# Patient Record
Sex: FEMALE | Race: BLACK OR AFRICAN AMERICAN | NOT HISPANIC OR LATINO | URBAN - METROPOLITAN AREA
[De-identification: names, ages, dates, MRNs, and addresses within clinical notes are randomized per-mention and may not be internally consistent; named-entity substitution may affect disease eponyms.]

---

## 2019-12-10 ENCOUNTER — EMERGENCY (EMERGENCY)
Facility: HOSPITAL | Age: 79
LOS: 1 days | Discharge: HOME | End: 2019-12-10
Attending: STUDENT IN AN ORGANIZED HEALTH CARE EDUCATION/TRAINING PROGRAM
Payer: MEDICARE

## 2019-12-10 VITALS
TEMPERATURE: 98 F | OXYGEN SATURATION: 98 % | DIASTOLIC BLOOD PRESSURE: 67 MMHG | HEIGHT: 67 IN | WEIGHT: 220.02 LBS | HEART RATE: 108 BPM | RESPIRATION RATE: 24 BRPM | SYSTOLIC BLOOD PRESSURE: 132 MMHG

## 2019-12-10 VITALS
RESPIRATION RATE: 20 BRPM | DIASTOLIC BLOOD PRESSURE: 65 MMHG | OXYGEN SATURATION: 100 % | SYSTOLIC BLOOD PRESSURE: 134 MMHG | HEART RATE: 98 BPM

## 2019-12-10 LAB
ALBUMIN SERPL ELPH-MCNC: 4 G/DL — SIGNIFICANT CHANGE UP (ref 3.3–5.2)
ALP SERPL-CCNC: 65 U/L — SIGNIFICANT CHANGE UP (ref 40–120)
ALT FLD-CCNC: 28 U/L — SIGNIFICANT CHANGE UP
ANION GAP SERPL CALC-SCNC: 19 MMOL/L — HIGH (ref 5–17)
APTT BLD: 23.8 SEC — LOW (ref 27.5–36.3)
AST SERPL-CCNC: 39 U/L — HIGH
BASOPHILS # BLD AUTO: 0.08 K/UL — SIGNIFICANT CHANGE UP (ref 0–0.2)
BASOPHILS NFR BLD AUTO: 0.9 % — SIGNIFICANT CHANGE UP (ref 0–2)
BILIRUB SERPL-MCNC: 0.4 MG/DL — SIGNIFICANT CHANGE UP (ref 0.4–2)
BUN SERPL-MCNC: 23 MG/DL — HIGH (ref 8–20)
CALCIUM SERPL-MCNC: 10.1 MG/DL — SIGNIFICANT CHANGE UP (ref 8.6–10.2)
CHLORIDE SERPL-SCNC: 98 MMOL/L — SIGNIFICANT CHANGE UP (ref 98–107)
CO2 SERPL-SCNC: 20 MMOL/L — LOW (ref 22–29)
CREAT SERPL-MCNC: 1.18 MG/DL — SIGNIFICANT CHANGE UP (ref 0.5–1.3)
EOSINOPHIL # BLD AUTO: 0.1 K/UL — SIGNIFICANT CHANGE UP (ref 0–0.5)
EOSINOPHIL NFR BLD AUTO: 1.1 % — SIGNIFICANT CHANGE UP (ref 0–6)
GLUCOSE SERPL-MCNC: 212 MG/DL — HIGH (ref 70–115)
HCT VFR BLD CALC: 50 % — HIGH (ref 34.5–45)
HGB BLD-MCNC: 15.8 G/DL — HIGH (ref 11.5–15.5)
IMM GRANULOCYTES NFR BLD AUTO: 0.1 % — SIGNIFICANT CHANGE UP (ref 0–1.5)
INR BLD: 1.01 RATIO — SIGNIFICANT CHANGE UP (ref 0.88–1.16)
LIDOCAIN IGE QN: 47 U/L — SIGNIFICANT CHANGE UP (ref 22–51)
LYMPHOCYTES # BLD AUTO: 4.28 K/UL — HIGH (ref 1–3.3)
LYMPHOCYTES # BLD AUTO: 48.8 % — HIGH (ref 13–44)
MAGNESIUM SERPL-MCNC: 2.4 MG/DL — SIGNIFICANT CHANGE UP (ref 1.6–2.6)
MCHC RBC-ENTMCNC: 27.2 PG — SIGNIFICANT CHANGE UP (ref 27–34)
MCHC RBC-ENTMCNC: 31.6 GM/DL — LOW (ref 32–36)
MCV RBC AUTO: 86.2 FL — SIGNIFICANT CHANGE UP (ref 80–100)
MONOCYTES # BLD AUTO: 0.77 K/UL — SIGNIFICANT CHANGE UP (ref 0–0.9)
MONOCYTES NFR BLD AUTO: 8.8 % — SIGNIFICANT CHANGE UP (ref 2–14)
NEUTROPHILS # BLD AUTO: 3.53 K/UL — SIGNIFICANT CHANGE UP (ref 1.8–7.4)
NEUTROPHILS NFR BLD AUTO: 40.3 % — LOW (ref 43–77)
NT-PROBNP SERPL-SCNC: 37 PG/ML — SIGNIFICANT CHANGE UP (ref 0–300)
PLATELET # BLD AUTO: 226 K/UL — SIGNIFICANT CHANGE UP (ref 150–400)
POTASSIUM SERPL-MCNC: 4.4 MMOL/L — SIGNIFICANT CHANGE UP (ref 3.5–5.3)
POTASSIUM SERPL-SCNC: 4.4 MMOL/L — SIGNIFICANT CHANGE UP (ref 3.5–5.3)
PROT SERPL-MCNC: 8.3 G/DL — SIGNIFICANT CHANGE UP (ref 6.6–8.7)
PROTHROM AB SERPL-ACNC: 11.6 SEC — SIGNIFICANT CHANGE UP (ref 10–12.9)
RBC # BLD: 5.8 M/UL — HIGH (ref 3.8–5.2)
RBC # FLD: 15.2 % — HIGH (ref 10.3–14.5)
SODIUM SERPL-SCNC: 137 MMOL/L — SIGNIFICANT CHANGE UP (ref 135–145)
TROPONIN T SERPL-MCNC: <0.01 NG/ML — SIGNIFICANT CHANGE UP (ref 0–0.06)
WBC # BLD: 8.77 K/UL — SIGNIFICANT CHANGE UP (ref 3.8–10.5)
WBC # FLD AUTO: 8.77 K/UL — SIGNIFICANT CHANGE UP (ref 3.8–10.5)

## 2019-12-10 PROCEDURE — 99285 EMERGENCY DEPT VISIT HI MDM: CPT

## 2019-12-10 PROCEDURE — 71046 X-RAY EXAM CHEST 2 VIEWS: CPT

## 2019-12-10 PROCEDURE — 85027 COMPLETE CBC AUTOMATED: CPT

## 2019-12-10 PROCEDURE — 85610 PROTHROMBIN TIME: CPT

## 2019-12-10 PROCEDURE — 71046 X-RAY EXAM CHEST 2 VIEWS: CPT | Mod: 26

## 2019-12-10 PROCEDURE — 84484 ASSAY OF TROPONIN QUANT: CPT

## 2019-12-10 PROCEDURE — 93005 ELECTROCARDIOGRAM TRACING: CPT

## 2019-12-10 PROCEDURE — 85730 THROMBOPLASTIN TIME PARTIAL: CPT

## 2019-12-10 PROCEDURE — 99284 EMERGENCY DEPT VISIT MOD MDM: CPT | Mod: 25

## 2019-12-10 PROCEDURE — 83880 ASSAY OF NATRIURETIC PEPTIDE: CPT

## 2019-12-10 PROCEDURE — 93010 ELECTROCARDIOGRAM REPORT: CPT

## 2019-12-10 PROCEDURE — 83690 ASSAY OF LIPASE: CPT

## 2019-12-10 PROCEDURE — 36415 COLL VENOUS BLD VENIPUNCTURE: CPT

## 2019-12-10 PROCEDURE — 80053 COMPREHEN METABOLIC PANEL: CPT

## 2019-12-10 PROCEDURE — 83735 ASSAY OF MAGNESIUM: CPT

## 2019-12-10 NOTE — ED PROVIDER NOTE - PATIENT PORTAL LINK FT
You can access the FollowMyHealth Patient Portal offered by [facility] by registering at the following website: [email or web address]. By joining [employer]’s FollowMyHealth portal, you will also be able to view your health information using other applications (apps) compatible with our system.

## 2019-12-10 NOTE — ED PROVIDER NOTE - PHYSICAL EXAMINATION
Const: Awake, alert and oriented. In no acute distress. Well appearing.  HEENT: NC/AT. Moist mucous membranes.  Eyes: No scleral icterus. EOMI.  Neck:. Soft and supple. Full ROM without pain.  Cardiac: Regular rate and regular rhythm. +S1/S2. Peripheral pulses 2+ and symmetric. No LE edema.  Resp: Speaking in full sentences. No evidence of respiratory distress. No wheezes, rales or rhonchi.  Abd: Soft, non-tender, non-distended. Normal bowel sounds in all 4 quadrants. No guarding or rebound.  Back: Spine midline and non-tender. No CVAT.  Skin: No rashes, abrasions or lacerations.  Lymph: No cervical lymphadenopathy.  Neuro: Awake, alert & oriented x 3. Moves all extremities symmetrically. follows commands, motor david upper and lower ext 5/5, sensory symm and intact CN 2-12 grossly intact, no ataxia, no nystagmus, no dysmetria, no ddk, symm david, no pronator drift

## 2019-12-10 NOTE — ED ADULT NURSE NOTE - OBJECTIVE STATEMENT
79y Female A&Ox4 c/o shortness of breath. Patient arrived via EMS, awake alert, breathing unlabored.  Patient states daughter fell, and she was trying to hold her up from falling.  Patient complaining of SOB which has been present for months. Pt increasingly dyspneic on room air during conversation. Pt denies LOC, chest pain, changes in GI/ patterns.

## 2019-12-10 NOTE — ED ADULT NURSE REASSESSMENT NOTE - NS ED NURSE REASSESS COMMENT FT1
SOB/ difficulty breathing has improved.  patient resting comfortably in wheelchair, breathing unlabored.  patient speaking on phone without difficulty in full sentences.

## 2019-12-10 NOTE — ED PROVIDER NOTE - OBJECTIVE STATEMENT
78yo female with pmh of HTN, HLD, DM, MYRIAM presents with sob. Pt saw her friend fall down stairs therefore called EMS and then she started to get anxious and hyperventilate, felt sob. Pt states then she started to feel lightheaded, worse when getting up, no loc/syncope. Pt states she is being worked up by pulm for her breathing had a CTA chest for PE done last friday doesn't know results but has not been told they found anything. PT had duplex LE last week which was normal. Pt denies fevers/chills, ha, loc, focal neuro deficits, cp/palp, cough, abd pain/n/v/d, urinary symptoms, recent travel and sick contacts.

## 2019-12-10 NOTE — ED ADULT TRIAGE NOTE - CHIEF COMPLAINT QUOTE
Patient arrived via EMS, awake alert, and oriented times 3, breathing unlabored.  Patient states daughter fell, and she was trying to hold her up from falling.  Patient complaining of SOB which has been present for months.

## 2019-12-10 NOTE — ED PROVIDER NOTE - CLINICAL SUMMARY MEDICAL DECISION MAKING FREE TEXT BOX
78yo female with pmh of HTN, HLD, DM, MYRIAM presents with sob. 78yo female with pmh of HTN, HLD, DM, MYRIAM presents with sob - no cp, sat % while here on RA, labs wnl, cxr no acute finding (wet read). Pt much more comfortable now likely due to the stress of seeing daughter fall and hyperventilating, pt to follow up with pmd and pulm and cards, which she has follow up appts with

## 2019-12-10 NOTE — ED PROVIDER NOTE - NS ED ROS FT
Review of Systems:  	•	CONSTITUTIONAL - no fever, no diaphoresis, no weight change  	•	SKIN - no rash  	•	HEMATOLOGIC - no bleeding, no bruising  	•	EYES - no eye pain, no blurred vision  	•	ENT - no change in hearing, no pain  	•	RESPIRATORY - + shortness of breath, no cough  	•	CARDIAC - no chest pain, no palpitations  	•	GI - no abd pain, no nausea, no vomiting, no diarrhea, no constipation, no bleeding  	•	GENITO-URINARY - no vaginal bleeding, no discharge, no dysuria; no hematuria  	•	ENDO - no polydypsia, no polyurea, no heat/no cold intolerance  	•	MUSCULOSKELETAL - no joint paint, no swelling, no redness  	•	NEUROLOGIC - +lightheaded, no weakness, no headache, no anesthesia, no paresthesias  	•	PSYCH - no anxiety, non suicidal, non homicidal, no hallucination, no depression

## 2022-12-14 ENCOUNTER — EMERGENCY (EMERGENCY)
Facility: HOSPITAL | Age: 82
LOS: 1 days | Discharge: HOME | End: 2022-12-14
Attending: EMERGENCY MEDICINE
Payer: MEDICARE

## 2022-12-14 VITALS
HEART RATE: 92 BPM | SYSTOLIC BLOOD PRESSURE: 178 MMHG | DIASTOLIC BLOOD PRESSURE: 82 MMHG | OXYGEN SATURATION: 99 % | TEMPERATURE: 98 F | RESPIRATION RATE: 20 BRPM

## 2022-12-14 VITALS
OXYGEN SATURATION: 100 % | SYSTOLIC BLOOD PRESSURE: 165 MMHG | HEART RATE: 80 BPM | TEMPERATURE: 98 F | DIASTOLIC BLOOD PRESSURE: 76 MMHG | RESPIRATION RATE: 19 BRPM

## 2022-12-14 PROBLEM — I10 ESSENTIAL (PRIMARY) HYPERTENSION: Chronic | Status: ACTIVE | Noted: 2019-12-10

## 2022-12-14 PROBLEM — E11.9 TYPE 2 DIABETES MELLITUS WITHOUT COMPLICATIONS: Chronic | Status: ACTIVE | Noted: 2019-12-10

## 2022-12-14 PROBLEM — E78.5 HYPERLIPIDEMIA, UNSPECIFIED: Chronic | Status: ACTIVE | Noted: 2019-12-10

## 2022-12-14 LAB
ALBUMIN SERPL ELPH-MCNC: 3.9 G/DL — SIGNIFICANT CHANGE UP (ref 3.3–5.2)
ALP SERPL-CCNC: 60 U/L — SIGNIFICANT CHANGE UP (ref 40–120)
ALT FLD-CCNC: 16 U/L — SIGNIFICANT CHANGE UP
ANION GAP SERPL CALC-SCNC: 13 MMOL/L — SIGNIFICANT CHANGE UP (ref 5–17)
APTT BLD: 32.8 SEC — SIGNIFICANT CHANGE UP (ref 27.5–35.5)
AST SERPL-CCNC: 25 U/L — SIGNIFICANT CHANGE UP
BASOPHILS # BLD AUTO: 0.08 K/UL — SIGNIFICANT CHANGE UP (ref 0–0.2)
BASOPHILS NFR BLD AUTO: 1.1 % — SIGNIFICANT CHANGE UP (ref 0–2)
BILIRUB SERPL-MCNC: 0.4 MG/DL — SIGNIFICANT CHANGE UP (ref 0.4–2)
BUN SERPL-MCNC: 25.3 MG/DL — HIGH (ref 8–20)
CALCIUM SERPL-MCNC: 9.9 MG/DL — SIGNIFICANT CHANGE UP (ref 8.4–10.5)
CHLORIDE SERPL-SCNC: 106 MMOL/L — SIGNIFICANT CHANGE UP (ref 96–108)
CO2 SERPL-SCNC: 22 MMOL/L — SIGNIFICANT CHANGE UP (ref 22–29)
CREAT SERPL-MCNC: 1.1 MG/DL — SIGNIFICANT CHANGE UP (ref 0.5–1.3)
EGFR: 50 ML/MIN/1.73M2 — LOW
EOSINOPHIL # BLD AUTO: 0.13 K/UL — SIGNIFICANT CHANGE UP (ref 0–0.5)
EOSINOPHIL NFR BLD AUTO: 1.7 % — SIGNIFICANT CHANGE UP (ref 0–6)
GLUCOSE SERPL-MCNC: 162 MG/DL — HIGH (ref 70–99)
HCT VFR BLD CALC: 30.7 % — LOW (ref 34.5–45)
HGB BLD-MCNC: 9.3 G/DL — LOW (ref 11.5–15.5)
IMM GRANULOCYTES NFR BLD AUTO: 0.3 % — SIGNIFICANT CHANGE UP (ref 0–0.9)
INR BLD: 1.54 RATIO — HIGH (ref 0.88–1.16)
LYMPHOCYTES # BLD AUTO: 2.73 K/UL — SIGNIFICANT CHANGE UP (ref 1–3.3)
LYMPHOCYTES # BLD AUTO: 36.1 % — SIGNIFICANT CHANGE UP (ref 13–44)
MCHC RBC-ENTMCNC: 24 PG — LOW (ref 27–34)
MCHC RBC-ENTMCNC: 30.3 GM/DL — LOW (ref 32–36)
MCV RBC AUTO: 79.3 FL — LOW (ref 80–100)
MONOCYTES # BLD AUTO: 0.52 K/UL — SIGNIFICANT CHANGE UP (ref 0–0.9)
MONOCYTES NFR BLD AUTO: 6.9 % — SIGNIFICANT CHANGE UP (ref 2–14)
NEUTROPHILS # BLD AUTO: 4.08 K/UL — SIGNIFICANT CHANGE UP (ref 1.8–7.4)
NEUTROPHILS NFR BLD AUTO: 53.9 % — SIGNIFICANT CHANGE UP (ref 43–77)
PLATELET # BLD AUTO: 385 K/UL — SIGNIFICANT CHANGE UP (ref 150–400)
POTASSIUM SERPL-MCNC: 4.6 MMOL/L — SIGNIFICANT CHANGE UP (ref 3.5–5.3)
POTASSIUM SERPL-SCNC: 4.6 MMOL/L — SIGNIFICANT CHANGE UP (ref 3.5–5.3)
PROT SERPL-MCNC: 7.4 G/DL — SIGNIFICANT CHANGE UP (ref 6.6–8.7)
PROTHROM AB SERPL-ACNC: 18 SEC — HIGH (ref 10.5–13.4)
RBC # BLD: 3.87 M/UL — SIGNIFICANT CHANGE UP (ref 3.8–5.2)
RBC # FLD: 15.2 % — HIGH (ref 10.3–14.5)
SODIUM SERPL-SCNC: 141 MMOL/L — SIGNIFICANT CHANGE UP (ref 135–145)
TROPONIN T SERPL-MCNC: <0.01 NG/ML — SIGNIFICANT CHANGE UP (ref 0–0.06)
WBC # BLD: 7.56 K/UL — SIGNIFICANT CHANGE UP (ref 3.8–10.5)
WBC # FLD AUTO: 7.56 K/UL — SIGNIFICANT CHANGE UP (ref 3.8–10.5)

## 2022-12-14 PROCEDURE — 85025 COMPLETE CBC W/AUTO DIFF WBC: CPT

## 2022-12-14 PROCEDURE — 36415 COLL VENOUS BLD VENIPUNCTURE: CPT

## 2022-12-14 PROCEDURE — 84484 ASSAY OF TROPONIN QUANT: CPT

## 2022-12-14 PROCEDURE — 93005 ELECTROCARDIOGRAM TRACING: CPT

## 2022-12-14 PROCEDURE — 85610 PROTHROMBIN TIME: CPT

## 2022-12-14 PROCEDURE — 71045 X-RAY EXAM CHEST 1 VIEW: CPT | Mod: 26

## 2022-12-14 PROCEDURE — 71045 X-RAY EXAM CHEST 1 VIEW: CPT

## 2022-12-14 PROCEDURE — 85730 THROMBOPLASTIN TIME PARTIAL: CPT

## 2022-12-14 PROCEDURE — 99284 EMERGENCY DEPT VISIT MOD MDM: CPT

## 2022-12-14 PROCEDURE — 93010 ELECTROCARDIOGRAM REPORT: CPT

## 2022-12-14 PROCEDURE — 99285 EMERGENCY DEPT VISIT HI MDM: CPT | Mod: 25

## 2022-12-14 PROCEDURE — 80053 COMPREHEN METABOLIC PANEL: CPT

## 2022-12-14 NOTE — ED PROVIDER NOTE - PROGRESS NOTE DETAILS
patient asymptomatic in the ED  VSS  results d/w patient, including anemia, and patient states she has anemia and hgb was 8 at [facility]  patient states she had a repeat echo x few days ago with her cardiologist  patient has follow up scheduled with [place] duffy md

## 2022-12-14 NOTE — ED ADULT NURSE NOTE - OBJECTIVE STATEMENT
81 y/o c/o a episode of dizziness and lightheadedness while she was eating breakfast this morning. patient reports she was recently hospitalized for b/l PEs and DVT in her right leg. patient reports she was discharged with blood thinners, eliquis. patient reports she has been taking her eliquis as prescribed. patient is currently on outpatient cardiac monitoring as per her cardiologist. patient reports she only took her eliquis this am. patient is A&Ox4. family bedside currently. denies chest pain or difficulty breathing at this time. VSS. continuing to monitor patient closely.

## 2022-12-14 NOTE — ED PROVIDER NOTE - OBJECTIVE STATEMENT
83 yo female with hx htn, dm, recent PE on eliquis, c/o episodes of lightheadedness this am. patient was admitted to [facility] 2 weeks ago, and had thrombectomy and started on eliquis. patient was scheduled for follow up today and was lightheaded  denies chest pain or sob  denies abdominal pain  currently asymptomatic

## 2022-12-14 NOTE — ED PROVIDER NOTE - PATIENT PORTAL LINK FT
You can access the FollowMyHealth Patient Portal offered by [facility] by registering at the following website: [email or web address]. By joining [employer]’s FollowMyHealth portal, you will also be able to view your health information using other applications (apps) compatible with our system.

## 2022-12-14 NOTE — ED PROVIDER NOTE - OUTSIDE ED RECORD SUMMARY
reviewed dc paperwork from [facility]  diagnosed with PE and started on eliquis x 2 weeks ago

## 2022-12-14 NOTE — ED ADULT NURSE NOTE - CHIEF COMPLAINT QUOTE
Pt BIBA c/o dizziness, intermittent x 2 weeks.  States she was treated in the hospital 2 weeks ago and told she had PEs.  Placed on blood thinners.  States she has a hx of vertigo but it has been a long time since she had an episode.  Denies cp.  No weakness, loss of sensation noted.

## 2023-09-11 NOTE — ED PROVIDER NOTE - CPE EDP PSYCH NORM
Please see my chart message below     Please review and advise     Thank you     Hayley Dunn RN, BSN  [facility]     
normal...

## 2024-03-13 ENCOUNTER — OFFICE (OUTPATIENT)
Dept: URBAN - METROPOLITAN AREA CLINIC 88 | Facility: CLINIC | Age: 84
Setting detail: OPHTHALMOLOGY
End: 2024-03-13
Payer: MEDICARE

## 2024-03-13 DIAGNOSIS — E11.3413: ICD-10-CM

## 2024-03-13 PROCEDURE — 92134 CPTRZ OPH DX IMG PST SGM RTA: CPT | Performed by: OPHTHALMOLOGY

## 2024-03-13 PROCEDURE — 99213 OFFICE O/P EST LOW 20 MIN: CPT | Performed by: OPHTHALMOLOGY

## 2024-03-13 ASSESSMENT — LID EXAM ASSESSMENTS
OD_BLEPHARITIS: RLL T
OS_BLEPHARITIS: LLL T

## 2024-04-25 ENCOUNTER — OFFICE (OUTPATIENT)
Dept: URBAN - METROPOLITAN AREA CLINIC 94 | Facility: CLINIC | Age: 84
Setting detail: OPHTHALMOLOGY
End: 2024-04-25
Payer: MEDICARE

## 2024-04-25 DIAGNOSIS — Z96.1: ICD-10-CM

## 2024-04-25 DIAGNOSIS — H01.002: ICD-10-CM

## 2024-04-25 DIAGNOSIS — H40.003: ICD-10-CM

## 2024-04-25 DIAGNOSIS — H01.005: ICD-10-CM

## 2024-04-25 PROCEDURE — 99213 OFFICE O/P EST LOW 20 MIN: CPT | Performed by: OPHTHALMOLOGY

## 2024-04-25 PROCEDURE — 92083 EXTENDED VISUAL FIELD XM: CPT | Performed by: OPHTHALMOLOGY

## 2024-04-25 ASSESSMENT — LID EXAM ASSESSMENTS
OD_BLEPHARITIS: RLL T
OS_BLEPHARITIS: LLL T

## 2024-05-15 ENCOUNTER — OFFICE (OUTPATIENT)
Dept: URBAN - METROPOLITAN AREA CLINIC 88 | Facility: CLINIC | Age: 84
Setting detail: OPHTHALMOLOGY
End: 2024-05-15
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
## 2024-05-15 PROCEDURE — NO SHOW FE NO SHOW FEE: Performed by: OPHTHALMOLOGY

## 2024-10-24 ENCOUNTER — OFFICE (OUTPATIENT)
Dept: URBAN - METROPOLITAN AREA CLINIC 94 | Facility: CLINIC | Age: 84
Setting detail: OPHTHALMOLOGY
End: 2024-10-24
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
## 2024-10-24 PROCEDURE — NO SHOW FE NO SHOW FEE: Performed by: OPHTHALMOLOGY

## 2025-06-01 ENCOUNTER — INPATIENT (INPATIENT)
Facility: HOSPITAL | Age: 85
LOS: 8 days | Discharge: HOME HEALTH | DRG: 69 | End: 2025-06-10
Attending: HOSPITALIST | Admitting: PSYCHIATRY & NEUROLOGY
Payer: COMMERCIAL

## 2025-06-01 VITALS
DIASTOLIC BLOOD PRESSURE: 62 MMHG | SYSTOLIC BLOOD PRESSURE: 147 MMHG | OXYGEN SATURATION: 98 % | RESPIRATION RATE: 18 BRPM | TEMPERATURE: 98 F | HEART RATE: 79 BPM

## 2025-06-01 DIAGNOSIS — G45.9 TRANSIENT CEREBRAL ISCHEMIC ATTACK, UNSPECIFIED: ICD-10-CM

## 2025-06-01 DIAGNOSIS — I26.99 OTHER PULMONARY EMBOLISM WITHOUT ACUTE COR PULMONALE: ICD-10-CM

## 2025-06-01 LAB
ALBUMIN SERPL ELPH-MCNC: 3.4 G/DL — SIGNIFICANT CHANGE UP (ref 3.3–5.2)
ALP SERPL-CCNC: 81 U/L — SIGNIFICANT CHANGE UP (ref 40–120)
ALT FLD-CCNC: 8 U/L — SIGNIFICANT CHANGE UP
ANION GAP SERPL CALC-SCNC: 17 MMOL/L — SIGNIFICANT CHANGE UP (ref 5–17)
APTT BLD: 27.7 SEC — SIGNIFICANT CHANGE UP (ref 26.1–36.8)
AST SERPL-CCNC: 18 U/L — SIGNIFICANT CHANGE UP
BASOPHILS # BLD AUTO: 0.07 K/UL — SIGNIFICANT CHANGE UP (ref 0–0.2)
BASOPHILS NFR BLD AUTO: 1 % — SIGNIFICANT CHANGE UP (ref 0–2)
BILIRUB SERPL-MCNC: 0.3 MG/DL — LOW (ref 0.4–2)
BUN SERPL-MCNC: 24.7 MG/DL — HIGH (ref 8–20)
CALCIUM SERPL-MCNC: 8.9 MG/DL — SIGNIFICANT CHANGE UP (ref 8.4–10.5)
CHLORIDE SERPL-SCNC: 102 MMOL/L — SIGNIFICANT CHANGE UP (ref 96–108)
CK SERPL-CCNC: 133 U/L — SIGNIFICANT CHANGE UP (ref 25–170)
CO2 SERPL-SCNC: 17 MMOL/L — LOW (ref 22–29)
CREAT SERPL-MCNC: 1.83 MG/DL — HIGH (ref 0.5–1.3)
EGFR: 27 ML/MIN/1.73M2 — LOW
EGFR: 27 ML/MIN/1.73M2 — LOW
EOSINOPHIL # BLD AUTO: 0.06 K/UL — SIGNIFICANT CHANGE UP (ref 0–0.5)
EOSINOPHIL NFR BLD AUTO: 0.9 % — SIGNIFICANT CHANGE UP (ref 0–6)
GLUCOSE BLDC GLUCOMTR-MCNC: 210 MG/DL — HIGH (ref 70–99)
GLUCOSE BLDC GLUCOMTR-MCNC: 224 MG/DL — HIGH (ref 70–99)
GLUCOSE SERPL-MCNC: 319 MG/DL — HIGH (ref 70–99)
HCT VFR BLD CALC: 30.5 % — LOW (ref 34.5–45)
HGB BLD-MCNC: 8.9 G/DL — LOW (ref 11.5–15.5)
IMM GRANULOCYTES # BLD AUTO: 0.03 K/UL — SIGNIFICANT CHANGE UP (ref 0–0.07)
IMM GRANULOCYTES NFR BLD AUTO: 0.4 % — SIGNIFICANT CHANGE UP (ref 0–0.9)
INR BLD: 1.21 RATIO — HIGH (ref 0.85–1.16)
LYMPHOCYTES # BLD AUTO: 1.7 K/UL — SIGNIFICANT CHANGE UP (ref 1–3.3)
LYMPHOCYTES NFR BLD AUTO: 24.4 % — SIGNIFICANT CHANGE UP (ref 13–44)
MCHC RBC-ENTMCNC: 23.3 PG — LOW (ref 27–34)
MCHC RBC-ENTMCNC: 29.2 G/DL — LOW (ref 32–36)
MCV RBC AUTO: 79.8 FL — LOW (ref 80–100)
MONOCYTES # BLD AUTO: 0.37 K/UL — SIGNIFICANT CHANGE UP (ref 0–0.9)
MONOCYTES NFR BLD AUTO: 5.3 % — SIGNIFICANT CHANGE UP (ref 2–14)
NEUTROPHILS # BLD AUTO: 4.75 K/UL — SIGNIFICANT CHANGE UP (ref 1.8–7.4)
NEUTROPHILS NFR BLD AUTO: 68 % — SIGNIFICANT CHANGE UP (ref 43–77)
NRBC # BLD AUTO: 0 K/UL — SIGNIFICANT CHANGE UP (ref 0–0)
NRBC # FLD: 0 K/UL — SIGNIFICANT CHANGE UP (ref 0–0)
NRBC BLD AUTO-RTO: 0 /100 WBCS — SIGNIFICANT CHANGE UP (ref 0–0)
PLATELET # BLD AUTO: 274 K/UL — SIGNIFICANT CHANGE UP (ref 150–400)
PMV BLD: 10.1 FL — SIGNIFICANT CHANGE UP (ref 7–13)
POTASSIUM SERPL-MCNC: 5.4 MMOL/L — HIGH (ref 3.5–5.3)
POTASSIUM SERPL-SCNC: 5.4 MMOL/L — HIGH (ref 3.5–5.3)
PROT SERPL-MCNC: 7.1 G/DL — SIGNIFICANT CHANGE UP (ref 6.6–8.7)
PROTHROM AB SERPL-ACNC: 13.7 SEC — HIGH (ref 9.9–13.4)
RBC # BLD: 3.82 M/UL — SIGNIFICANT CHANGE UP (ref 3.8–5.2)
RBC # FLD: 17.2 % — HIGH (ref 10.3–14.5)
SODIUM SERPL-SCNC: 136 MMOL/L — SIGNIFICANT CHANGE UP (ref 135–145)
TROPONIN T, HIGH SENSITIVITY RESULT: 33 NG/L — SIGNIFICANT CHANGE UP (ref 0–51)
WBC # BLD: 6.98 K/UL — SIGNIFICANT CHANGE UP (ref 3.8–10.5)
WBC # FLD AUTO: 6.98 K/UL — SIGNIFICANT CHANGE UP (ref 3.8–10.5)

## 2025-06-01 PROCEDURE — 71045 X-RAY EXAM CHEST 1 VIEW: CPT | Mod: 26

## 2025-06-01 PROCEDURE — 70496 CT ANGIOGRAPHY HEAD: CPT | Mod: 26

## 2025-06-01 PROCEDURE — 0042T: CPT

## 2025-06-01 PROCEDURE — 93306 TTE W/DOPPLER COMPLETE: CPT | Mod: 26

## 2025-06-01 PROCEDURE — 93970 EXTREMITY STUDY: CPT | Mod: 26

## 2025-06-01 PROCEDURE — 99222 1ST HOSP IP/OBS MODERATE 55: CPT

## 2025-06-01 PROCEDURE — 70450 CT HEAD/BRAIN W/O DYE: CPT | Mod: 26,59

## 2025-06-01 PROCEDURE — 70498 CT ANGIOGRAPHY NECK: CPT | Mod: 26

## 2025-06-01 PROCEDURE — 99285 EMERGENCY DEPT VISIT HI MDM: CPT

## 2025-06-01 RX ORDER — DEXTROSE 50 % IN WATER 50 %
12.5 SYRINGE (ML) INTRAVENOUS ONCE
Refills: 0 | Status: DISCONTINUED | OUTPATIENT
Start: 2025-06-01 | End: 2025-06-10

## 2025-06-01 RX ORDER — INSULIN LISPRO 100 U/ML
INJECTION, SOLUTION INTRAVENOUS; SUBCUTANEOUS
Refills: 0 | Status: DISCONTINUED | OUTPATIENT
Start: 2025-06-01 | End: 2025-06-10

## 2025-06-01 RX ORDER — FERROUS SULFATE 137(45) MG
1 TABLET, EXTENDED RELEASE ORAL
Refills: 0 | DISCHARGE

## 2025-06-01 RX ORDER — ATORVASTATIN CALCIUM 80 MG/1
80 TABLET, FILM COATED ORAL AT BEDTIME
Refills: 0 | Status: DISCONTINUED | OUTPATIENT
Start: 2025-06-01 | End: 2025-06-10

## 2025-06-01 RX ORDER — SODIUM CHLORIDE 9 G/1000ML
1000 INJECTION, SOLUTION INTRAVENOUS
Refills: 0 | Status: DISCONTINUED | OUTPATIENT
Start: 2025-06-01 | End: 2025-06-10

## 2025-06-01 RX ORDER — DEXTROSE 50 % IN WATER 50 %
25 SYRINGE (ML) INTRAVENOUS ONCE
Refills: 0 | Status: DISCONTINUED | OUTPATIENT
Start: 2025-06-01 | End: 2025-06-10

## 2025-06-01 RX ORDER — POLYETHYLENE GLYCOL 3350 17 G/17G
17 POWDER, FOR SOLUTION ORAL AT BEDTIME
Refills: 0 | Status: DISCONTINUED | OUTPATIENT
Start: 2025-06-01 | End: 2025-06-10

## 2025-06-01 RX ORDER — ATORVASTATIN CALCIUM 80 MG/1
1 TABLET, FILM COATED ORAL
Refills: 0 | DISCHARGE

## 2025-06-01 RX ORDER — METFORMIN HYDROCHLORIDE 850 MG/1
1 TABLET ORAL
Refills: 0 | DISCHARGE

## 2025-06-01 RX ORDER — SENNA 187 MG
2 TABLET ORAL AT BEDTIME
Refills: 0 | Status: DISCONTINUED | OUTPATIENT
Start: 2025-06-01 | End: 2025-06-10

## 2025-06-01 RX ORDER — APIXABAN 2.5 MG/1
1 TABLET, FILM COATED ORAL
Refills: 0 | DISCHARGE

## 2025-06-01 RX ORDER — GLUCAGON 3 MG/1
1 POWDER NASAL ONCE
Refills: 0 | Status: DISCONTINUED | OUTPATIENT
Start: 2025-06-01 | End: 2025-06-10

## 2025-06-01 RX ORDER — ASPIRIN 325 MG
1 TABLET ORAL
Refills: 0 | DISCHARGE

## 2025-06-01 RX ORDER — ACETAMINOPHEN 500 MG/5ML
650 LIQUID (ML) ORAL EVERY 6 HOURS
Refills: 0 | Status: DISCONTINUED | OUTPATIENT
Start: 2025-06-01 | End: 2025-06-10

## 2025-06-01 RX ORDER — LABETALOL HYDROCHLORIDE 200 MG/1
10 TABLET, FILM COATED ORAL EVERY 4 HOURS
Refills: 0 | Status: DISCONTINUED | OUTPATIENT
Start: 2025-06-01 | End: 2025-06-01

## 2025-06-01 RX ORDER — ASPIRIN 325 MG
81 TABLET ORAL DAILY
Refills: 0 | Status: DISCONTINUED | OUTPATIENT
Start: 2025-06-01 | End: 2025-06-10

## 2025-06-01 RX ORDER — DEXTROSE 50 % IN WATER 50 %
15 SYRINGE (ML) INTRAVENOUS ONCE
Refills: 0 | Status: DISCONTINUED | OUTPATIENT
Start: 2025-06-01 | End: 2025-06-10

## 2025-06-01 RX ORDER — LABETALOL HYDROCHLORIDE 200 MG/1
10 TABLET, FILM COATED ORAL EVERY 4 HOURS
Refills: 0 | Status: DISCONTINUED | OUTPATIENT
Start: 2025-06-01 | End: 2025-06-10

## 2025-06-01 RX ORDER — FERROUS SULFATE 137(45) MG
325 TABLET, EXTENDED RELEASE ORAL EVERY 8 HOURS
Refills: 0 | Status: DISCONTINUED | OUTPATIENT
Start: 2025-06-01 | End: 2025-06-10

## 2025-06-01 RX ORDER — APIXABAN 2.5 MG/1
2.5 TABLET, FILM COATED ORAL EVERY 12 HOURS
Refills: 0 | Status: DISCONTINUED | OUTPATIENT
Start: 2025-06-01 | End: 2025-06-10

## 2025-06-01 RX ADMIN — APIXABAN 2.5 MILLIGRAM(S): 2.5 TABLET, FILM COATED ORAL at 18:37

## 2025-06-01 RX ADMIN — INSULIN LISPRO 4: 100 INJECTION, SOLUTION INTRAVENOUS; SUBCUTANEOUS at 16:44

## 2025-06-01 RX ADMIN — INSULIN LISPRO 4: 100 INJECTION, SOLUTION INTRAVENOUS; SUBCUTANEOUS at 21:56

## 2025-06-01 RX ADMIN — Medication 325 MILLIGRAM(S): at 21:55

## 2025-06-01 RX ADMIN — LABETALOL HYDROCHLORIDE 10 MILLIGRAM(S): 200 TABLET, FILM COATED ORAL at 23:18

## 2025-06-01 RX ADMIN — Medication 5 MILLIGRAM(S): at 19:50

## 2025-06-01 RX ADMIN — ATORVASTATIN CALCIUM 80 MILLIGRAM(S): 80 TABLET, FILM COATED ORAL at 21:55

## 2025-06-01 RX ADMIN — Medication 2 TABLET(S): at 21:55

## 2025-06-01 RX ADMIN — Medication 325 MILLIGRAM(S): at 16:44

## 2025-06-01 NOTE — ED ADULT TRIAGE NOTE - CHIEF COMPLAINT QUOTE
BIBEMS with c/o difficulty finding words, trouble speaking, and dizziness that started 0745. code stroke called.

## 2025-06-01 NOTE — ED PROVIDER NOTE - CCCP TRG CHIEF CMPLNT
code: stroke Patient requests all Lab, Cardiology, and Radiology Results on their Discharge Instructions

## 2025-06-01 NOTE — ED ADULT NURSE NOTE - OBJECTIVE STATEMENT
A&ox3, family at bedside, presents to ED after having an episode of weakness and difficulty speaking at 745 am. States she was trying to grab something from her night stand and then tried to sit herself up but was unable to. h/o stroke approx. 2022. eyes PERRLA. neuro exam normal. two Ivs 20g AC and hand right side. Breathing is spontaneous even and unlabored. Bed is in lowest position and side rails up. Safety precautions maintained. Connected to cardiac monitor and pulse ox.

## 2025-06-01 NOTE — H&P ADULT - HISTORY OF PRESENT ILLNESS
[facility] Stroke Attending Note  CC: slurred speech.   HPI:  The patient is a 83 y/o F with hx of prior stroke in 2022, hx of Right carotid stent > 5 years for asymptomatic carotid disease, PE in 2022 s/p thrombectomy post-covid vaccine, on Eliquis 2.5mg BID and ASA, DM, CKD, Anemia, HLD who presents for transient episode of slurred speech and R arm weakness. Last well known last evening, woke up this AM at 7:30AM and noted that she had difficulty raising her right arm and pulling herself up.  No other focal weakness. Her children also noted she was slurring her words. Upon arrival here, no more symptoms.      Denies any prior episode.  Admits to noncompliance with Eliquis and misses nightly dose regularly.  Follows with hematology/pulm/nephro.  Has known anemia and CKD.     Stroke risk factors:    PAST MEDICAL & SURGICAL HISTORY:  HTN (hypertension)      HLD (hyperlipidemia)      DM (diabetes mellitus)          MEDICATIONS  (STANDING):  apixaban 2.5 milliGRAM(s) Oral every 12 hours  aspirin enteric coated 81 milliGRAM(s) Oral daily  atorvastatin 80 milliGRAM(s) Oral at bedtime  senna 2 Tablet(s) Oral at bedtime    MEDICATIONS  (PRN):  acetaminophen     Tablet .. 650 milliGRAM(s) Oral every 6 hours PRN Temp greater or equal to 38C (100.4F), Moderate Pain (4 - 6)  polyethylene glycol 3350 17 Gram(s) Oral at bedtime PRN Constipation      Allergies    No Known Allergies    Intolerances        SOCIAL HISTORY:  no tob,   no alcohol   no drugs    FAMILY HISTORY:        ROS: 14 point ROS negative other than what is present in HPI or below    Vital Signs Last 24 Hrs  T(C): 36.6 (01 Jun 2025 11:06), Max: 36.7 (01 Jun 2025 08:55)  T(F): 97.8 (01 Jun 2025 11:06), Max: 98.1 (01 Jun 2025 08:55)  HR: 74 (01 Jun 2025 11:06) (74 - 79)  BP: 142/82 (01 Jun 2025 11:06) (142/82 - 147/62)  BP(mean): --  RR: 20 (01 Jun 2025 11:06) (18 - 20)  SpO2: 100% (01 Jun 2025 11:06) (98% - 100%)    Parameters below as of 01 Jun 2025 11:06  Patient On (Oxygen Delivery Method): room air          Physical Exam:  General: No acute distress.   HEENT: Head normocephalic, atraumatic. Conjunctivae clear w/o exudates or hemorrhage. Sclera non-icteric. Nares are patent bilaterally. Mucous membranes pink and moist.  No tonsillar swelling or exudates.    Neck: Supple, no adenopathy. Trachea midline. No JVD.  Cardiac: Normal rate and rhythm. S1, S2 auscultated. No murmurs, gallops, or rubs.     Respiratory: Lung sounds clear in all fields. Chest wall symmetric, nontender.   Abdominal: Soft, nondistended, nontender. Bowel sounds normoactive x 4 quadrants. No masses, hepatomegaly, or splenomegaly.   Skin: Skin is warm, dry and intact without rashes or lesions. Appropriate color for ethnicity. Nailbeds pink with no cyanosis or clubbing.   Extremities: No edema, 2+ peripheral pulses in b/l upper and b/l lower extremities. Full range of motion in all joints.     Detailed Neurologic Exam:    Mental status: The patient is awake and alert and has normal attention span.  The patient is fully oriented in 3 spheres. The patient is oriented to current events. The patient is able to name objects, follow commands, repeat sentences.    Cranial nerves: Pupils equal and react symmetrically to light. There is no visual field deficit to confrontation. Extraocular motion is full with no nystagmus. There is no ptosis. Facial sensation is intact. Facial musculature is symmetric. Palate elevates symmetrically. Tongue is midline.    Motor: There is normal bulk and tone.  There is no tremor.  Strength is 5/5 in the right arm and leg.   Strength is 5/5 in the left arm and leg.    Sensation: Intact to light touch and pin in 4 extremities    Reflexes: 1-2+ throughout and plantar responses are flexor.    Cerebellar: There is no dysmetria on finger to nose testing.    Gait : deferred    NIH SS:  DATE:  TIME:  1A: Level of consciousness (0-3):   1B: Questions (0-2):   1C: Commands (0-2):   2: Gaze (0-2):   3: Visual fields (0-3):   4: Facial palsy (0-3):   MOTOR:  5A: Left arm motor drift (0-4):   5B: Right arm motor drift (0-4):   6A: Left leg motor drift (0-4):   6B: Right leg motor drift (0-4):   7: Limb ataxia (0-2):   SENSORY:  8: Sensation (0-2):   SPEECH:  9: Language (0-3):   10: Dysarthria (0-2):   EXTINCTION:  11: Extinction/inattention (0-2):     TOTAL SCORE: 0    prehospital mRS= 2      LABS:                         8.9    6.98  )-----------( 274      ( 01 Jun 2025 09:08 )             30.5       06-01    136  |  102  |  24.7[H]  ----------------------------<  319[H]  5.4[H]   |  17.0[L]  |  1.83[H]    Ca    8.9      01 Jun 2025 09:08    TPro  7.1  /  Alb  3.4  /  TBili  0.3[L]  /  DBili  x   /  AST  18  /  ALT  8   /  AlkPhos  81  06-01      PT/INR - ( 01 Jun 2025 09:57 )   PT: 13.7 sec;   INR: 1.21 ratio         PTT - ( 01 Jun 2025 09:57 )  PTT:27.7 sec        A1C:         RADIOLOGY & ADDITIONAL STUDIES (independently reviewed unless otherwise noted):  < from: CT Angio Neck Stroke Protocol w/ IV Cont (06.01.25 @ 09:26) >    CT HEAD: No acute intracranial findings.    Findings discussed with Dr. Galaviz of the patient's clinical team on   6/1/2025 at 9:11 AM.    CT PERFUSION: No focal perfusion deficit.    CT ANGIOGRAPHY NECK: Right carotid stent appears patent. Short-segment   moderatenarrowing proximal left cervical ICA.    CT ANGIOGRAPHY HEAD: Moderate narrowing left cavernous segment ICA.   Severe narrowing right distal petrous/proximal cavernous segment ICA.   Mild to moderate narrowing right paraclinoid segment ICA. Focal moderate   narrowing right proximal P2 segment. Focal near-complete occlusion right   distal P2 segment.    < end of copied text >

## 2025-06-01 NOTE — H&P ADULT - ASSESSMENT
83 y/o F with hx of prior stroke in 2022, hx of Right carotid stent > 5 years for asymptomatic carotid disease, PE in 2022 s/p thrombectomy post-covid vaccine, on Eliquis 2.5mg BID and ASA who presents for transient episode of slurred speech and R arm weakness.  CT head neg. 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      Impression: High risk TIA    NEURO: TIA  -Neurologically ---nonfocal  -Continue close monitoring for neurologic deterioration    - Stroke neuro checks q 4 hours    - SBP  120-160  -ANTITHROMBOTIC THERAPY: Eliquis 2.5mg BID and ASA 81mg daily  - titrate statin to LDL goal less than 70--start Lipitor  -MRI Brain w/o  - Possible Neuro IR consultation  -Dysphagia screen: pass/fail   -Physical therapy/OT/Speech eval/treatment.     CARDIOVASCULAR: HTN, HLD  - EKG Pending   - TTE: Pending  - Continuous cardiac monitoring for arrhythmias   - Blood pressure and antiplatelet/anticoagulation plan as above               Heme:  Anemia of chronic disease  - follow Hb  - outpatient f/u with hematology    ENDO: DM with hyperglycemia  - HbA1c: pending  - continue regular fingersticks   - continue insulin sliding scale   - monitor glucose levels     PULMONARY:   - protecting airway, saturating well     RENAL: Acute on  CKD  - Gentle hydration  - monitor urine output, maintain adequate hydration    - Na Goal:  135-145  - monitor metabolic panels   - Brooke: n/a    ID:   - Afebrile, no leukocytosis, monitor for si/sx of infection   - monitor temperature curve   - UA pending    GI/Nutrition: VEENA  - dysphagia screen   - continue bowel regimen       Skin/Musk:  - recurrent evaluations for evidence of skin breakdown     OTHER:  condition and plan of care d/w patient, questions and concerns addressed.     DISPOSITION: Rehab or home depending on PT eval once stable and workup is complete      CORE MEASURES:        Admission NIHSS:      Tenecteplase : [] YES [] NO      LDL/HDL/A1C:     Depression Screen- if depression hx and/or present      Statin Therapy:     Dysphagia Screen: [] PASS [] FAIL     Smoking [] YES [] NO      Afib [] YES [] NO     Stroke Education [] YES [] NO    Obtain screening lower extremity venous ultrasound in patients who meet 1 or more of the following criteria as patient is high risk for DVT/PE on admission:   [] History of DVT/PE  []Hypercoagulable states (Factor V Leiden, Cancer, OCP, etc. )  []Prolonged immobility (hemiplegia/hemiparesis/post operative or any other extended immobilization)  [] Transferred from outside facility (Rehab or Long term care)  [] Age </= to 50

## 2025-06-01 NOTE — ED PROVIDER NOTE - PHYSICAL EXAMINATION
GENERAL: no acute distress, comfortably in bed  HEENT: Atraumatic, normocephalic, non-icteric, no JVD  NEURO:  A&Ox3, no focal deficits, moving all extremities spontaneously, no dysarthria,Cn 2-12 intact strength and sensation intact speech intact   LUNGS: CTAB, no wrr, non-labored breathing  HEART: RRR, no murmur appreciated  ABD: Soft, non-tender, non-distended, no organomegaly, no appreciable masses  EXTREMITIES: Nontender, no clubbing, cyanosis, or edema  SKIN: No rashes or lesions

## 2025-06-01 NOTE — ED PROVIDER NOTE - CLINICAL SUMMARY MEDICAL DECISION MAKING FREE TEXT BOX
84-year-old female with a history of a right carotid endarterectomy and right carotid artery stent presenting with word finding difficulty since this morning around 7 AM when she woke up as well as lightheadedness.  States it lasted for about a half an hour and is now currently resolving.  States she does take Eliquis as well for PE.  Patient on exam with no acute neurodeficits.  Possible TIA versus CVA.  Stroke protocol called and patient taken to CAT scan.  CAT scan shows no acute infarct with patent stent and multiple small vessel occlusions.  NIH is 0.  Patient accepted by stroke attending no acute neurological deficits.  EKG normal sinus rhythm no acute prescription

## 2025-06-01 NOTE — ED ADULT NURSE REASSESSMENT NOTE - NS ED NURSE REASSESS COMMENT FT1
BP elevated, MDF notified and stroke team called, stroke team advised to watch trends and "will work on bringing her down tomorrow'. Breathing is spontaneous even and unlabored. Neuro exam normal. Patient denies any current symptoms (headache, dizziness, SOB, numbness/tingling). Bed is in lowest position and side rails up. Safety precautions maintained.

## 2025-06-02 DIAGNOSIS — E11.9 TYPE 2 DIABETES MELLITUS WITHOUT COMPLICATIONS: ICD-10-CM

## 2025-06-02 DIAGNOSIS — I10 ESSENTIAL (PRIMARY) HYPERTENSION: ICD-10-CM

## 2025-06-02 LAB
A1C WITH ESTIMATED AVERAGE GLUCOSE RESULT: 8.7 % — HIGH (ref 4–5.6)
ALBUMIN SERPL ELPH-MCNC: 3.3 G/DL — SIGNIFICANT CHANGE UP (ref 3.3–5.2)
ALP SERPL-CCNC: 84 U/L — SIGNIFICANT CHANGE UP (ref 40–120)
ALT FLD-CCNC: 7 U/L — SIGNIFICANT CHANGE UP
ANION GAP SERPL CALC-SCNC: 16 MMOL/L — SIGNIFICANT CHANGE UP (ref 5–17)
APPEARANCE UR: ABNORMAL
AST SERPL-CCNC: 16 U/L — SIGNIFICANT CHANGE UP
BACTERIA # UR AUTO: ABNORMAL /HPF
BILIRUB DIRECT SERPL-MCNC: 0.1 MG/DL — SIGNIFICANT CHANGE UP (ref 0–0.3)
BILIRUB INDIRECT FLD-MCNC: 0.3 MG/DL — SIGNIFICANT CHANGE UP (ref 0.2–1)
BILIRUB SERPL-MCNC: 0.4 MG/DL — SIGNIFICANT CHANGE UP (ref 0.4–2)
BILIRUB UR-MCNC: NEGATIVE — SIGNIFICANT CHANGE UP
BUN SERPL-MCNC: 27.7 MG/DL — HIGH (ref 8–20)
CALCIUM SERPL-MCNC: 8.9 MG/DL — SIGNIFICANT CHANGE UP (ref 8.4–10.5)
CAST: 2 /LPF — SIGNIFICANT CHANGE UP (ref 0–4)
CHLORIDE SERPL-SCNC: 105 MMOL/L — SIGNIFICANT CHANGE UP (ref 96–108)
CHOLEST SERPL-MCNC: 200 MG/DL — HIGH
CO2 SERPL-SCNC: 20 MMOL/L — LOW (ref 22–29)
COLOR SPEC: YELLOW — SIGNIFICANT CHANGE UP
CREAT SERPL-MCNC: 2 MG/DL — HIGH (ref 0.5–1.3)
DIFF PNL FLD: ABNORMAL
EGFR: 24 ML/MIN/1.73M2 — LOW
EGFR: 24 ML/MIN/1.73M2 — LOW
ESTIMATED AVERAGE GLUCOSE: 203 MG/DL — HIGH (ref 68–114)
GLUCOSE BLDC GLUCOMTR-MCNC: 167 MG/DL — HIGH (ref 70–99)
GLUCOSE BLDC GLUCOMTR-MCNC: 222 MG/DL — HIGH (ref 70–99)
GLUCOSE BLDC GLUCOMTR-MCNC: 225 MG/DL — HIGH (ref 70–99)
GLUCOSE BLDC GLUCOMTR-MCNC: 262 MG/DL — HIGH (ref 70–99)
GLUCOSE SERPL-MCNC: 194 MG/DL — HIGH (ref 70–99)
GLUCOSE UR QL: NEGATIVE MG/DL — SIGNIFICANT CHANGE UP
HCT VFR BLD CALC: 28.1 % — LOW (ref 34.5–45)
HDLC SERPL-MCNC: 38 MG/DL — LOW
HGB BLD-MCNC: 8.1 G/DL — LOW (ref 11.5–15.5)
KETONES UR QL: ABNORMAL MG/DL
LDLC SERPL-MCNC: 131 MG/DL — HIGH
LEUKOCYTE ESTERASE UR-ACNC: ABNORMAL
LIPID PNL WITH DIRECT LDL SERPL: 131 MG/DL — HIGH
MAGNESIUM SERPL-MCNC: 2.1 MG/DL — SIGNIFICANT CHANGE UP (ref 1.6–2.6)
MCHC RBC-ENTMCNC: 22.6 PG — LOW (ref 27–34)
MCHC RBC-ENTMCNC: 28.8 G/DL — LOW (ref 32–36)
MCV RBC AUTO: 78.3 FL — LOW (ref 80–100)
NITRITE UR-MCNC: NEGATIVE — SIGNIFICANT CHANGE UP
NONHDLC SERPL-MCNC: 162 MG/DL — HIGH
NRBC # BLD AUTO: 0 K/UL — SIGNIFICANT CHANGE UP (ref 0–0)
NRBC # FLD: 0 K/UL — SIGNIFICANT CHANGE UP (ref 0–0)
NRBC BLD AUTO-RTO: 0 /100 WBCS — SIGNIFICANT CHANGE UP (ref 0–0)
PH UR: 6 — SIGNIFICANT CHANGE UP (ref 5–8)
PHOSPHATE SERPL-MCNC: 3.6 MG/DL — SIGNIFICANT CHANGE UP (ref 2.4–4.7)
PLATELET # BLD AUTO: 264 K/UL — SIGNIFICANT CHANGE UP (ref 150–400)
PMV BLD: 10.1 FL — SIGNIFICANT CHANGE UP (ref 7–13)
POTASSIUM SERPL-MCNC: 4.7 MMOL/L — SIGNIFICANT CHANGE UP (ref 3.5–5.3)
POTASSIUM SERPL-SCNC: 4.7 MMOL/L — SIGNIFICANT CHANGE UP (ref 3.5–5.3)
PROT SERPL-MCNC: 6.5 G/DL — LOW (ref 6.6–8.7)
PROT UR-MCNC: 300 MG/DL
RBC # BLD: 3.59 M/UL — LOW (ref 3.8–5.2)
RBC # FLD: 17.3 % — HIGH (ref 10.3–14.5)
RBC CASTS # UR COMP ASSIST: 6 /HPF — HIGH (ref 0–4)
SODIUM SERPL-SCNC: 141 MMOL/L — SIGNIFICANT CHANGE UP (ref 135–145)
SP GR SPEC: >1.03 — HIGH (ref 1–1.03)
SQUAMOUS # UR AUTO: 2 /HPF — SIGNIFICANT CHANGE UP (ref 0–5)
TRIGL SERPL-MCNC: 171 MG/DL — HIGH
UROBILINOGEN FLD QL: 1 MG/DL — SIGNIFICANT CHANGE UP (ref 0.2–1)
WBC # BLD: 6.25 K/UL — SIGNIFICANT CHANGE UP (ref 3.8–10.5)
WBC # FLD AUTO: 6.25 K/UL — SIGNIFICANT CHANGE UP (ref 3.8–10.5)
WBC UR QL: 59 /HPF — HIGH (ref 0–5)

## 2025-06-02 PROCEDURE — 99223 1ST HOSP IP/OBS HIGH 75: CPT

## 2025-06-02 PROCEDURE — 99233 SBSQ HOSP IP/OBS HIGH 50: CPT

## 2025-06-02 RX ORDER — INSULIN GLARGINE-YFGN 100 [IU]/ML
12 INJECTION, SOLUTION SUBCUTANEOUS AT BEDTIME
Refills: 0 | Status: DISCONTINUED | OUTPATIENT
Start: 2025-06-02 | End: 2025-06-03

## 2025-06-02 RX ORDER — CEFTRIAXONE 500 MG/1
1000 INJECTION, POWDER, FOR SOLUTION INTRAMUSCULAR; INTRAVENOUS EVERY 24 HOURS
Refills: 0 | Status: DISCONTINUED | OUTPATIENT
Start: 2025-06-02 | End: 2025-06-02

## 2025-06-02 RX ORDER — LISINOPRIL 30 MG/1
100 TABLET ORAL DAILY
Refills: 0 | Status: DISCONTINUED | OUTPATIENT
Start: 2025-06-02 | End: 2025-06-10

## 2025-06-02 RX ORDER — CEFTRIAXONE 500 MG/1
1000 INJECTION, POWDER, FOR SOLUTION INTRAMUSCULAR; INTRAVENOUS EVERY 24 HOURS
Refills: 0 | Status: COMPLETED | OUTPATIENT
Start: 2025-06-02 | End: 2025-06-04

## 2025-06-02 RX ADMIN — INSULIN GLARGINE-YFGN 12 UNIT(S): 100 INJECTION, SOLUTION SUBCUTANEOUS at 22:03

## 2025-06-02 RX ADMIN — CEFTRIAXONE 1000 MILLIGRAM(S): 500 INJECTION, POWDER, FOR SOLUTION INTRAMUSCULAR; INTRAVENOUS at 19:38

## 2025-06-02 RX ADMIN — APIXABAN 2.5 MILLIGRAM(S): 2.5 TABLET, FILM COATED ORAL at 05:16

## 2025-06-02 RX ADMIN — INSULIN LISPRO 2: 100 INJECTION, SOLUTION INTRAVENOUS; SUBCUTANEOUS at 22:03

## 2025-06-02 RX ADMIN — Medication 325 MILLIGRAM(S): at 13:01

## 2025-06-02 RX ADMIN — INSULIN LISPRO 6: 100 INJECTION, SOLUTION INTRAVENOUS; SUBCUTANEOUS at 17:04

## 2025-06-02 RX ADMIN — Medication 10 MILLIGRAM(S): at 08:56

## 2025-06-02 RX ADMIN — Medication 81 MILLIGRAM(S): at 09:57

## 2025-06-02 RX ADMIN — INSULIN LISPRO 4: 100 INJECTION, SOLUTION INTRAVENOUS; SUBCUTANEOUS at 08:21

## 2025-06-02 RX ADMIN — Medication 25 MILLIGRAM(S): at 17:05

## 2025-06-02 RX ADMIN — LABETALOL HYDROCHLORIDE 10 MILLIGRAM(S): 200 TABLET, FILM COATED ORAL at 20:18

## 2025-06-02 RX ADMIN — LISINOPRIL 100 MILLIGRAM(S): 30 TABLET ORAL at 19:38

## 2025-06-02 RX ADMIN — Medication 75 MILLILITER(S): at 08:56

## 2025-06-02 RX ADMIN — Medication 325 MILLIGRAM(S): at 05:15

## 2025-06-02 RX ADMIN — ATORVASTATIN CALCIUM 80 MILLIGRAM(S): 80 TABLET, FILM COATED ORAL at 22:04

## 2025-06-02 RX ADMIN — APIXABAN 2.5 MILLIGRAM(S): 2.5 TABLET, FILM COATED ORAL at 17:05

## 2025-06-02 RX ADMIN — Medication 500 MILLIGRAM(S): at 09:57

## 2025-06-02 RX ADMIN — Medication 325 MILLIGRAM(S): at 22:03

## 2025-06-02 RX ADMIN — INSULIN LISPRO 4: 100 INJECTION, SOLUTION INTRAVENOUS; SUBCUTANEOUS at 11:21

## 2025-06-02 NOTE — DISCHARGE NOTE PROVIDER - CARE PROVIDER_API CALL
Melody Martinez  Neurology  [address] [place] [address]  Phone: [phone]  Fax: [phone]  Follow Up Time: 1 month    Your Cardiologist,   Phone: (   )    -  Fax: (   )    -  Follow Up Time: 2 weeks    Your Endocrinologist,   Phone: (   )    -  Fax: (   )    -  Follow Up Time: 2 weeks    Your Primary Care Provider,   Phone: (   )    -  Fax: (   )    -  Follow Up Time: 1 week   Melody Martinez  Neurology  [address] [place] [address]  Phone: [phone]  Fax: [phone]  Follow Up Time: 1 month    Your Cardiologist,   Phone: (   )    -  Fax: (   )    -  Follow Up Time: 2 weeks    Your Endocrinologist,   Phone: (   )    -  Fax: (   )    -  Follow Up Time: 2 weeks    Your Primary Care Provider,   Phone: (   )    -  Fax: (   )    -  Follow Up Time: 1 week    Jesse Marcial  Nephrology  [address] [place] [address]  Phone: [phone]  Fax: [phone]  Follow Up Time:

## 2025-06-02 NOTE — OCCUPATIONAL THERAPY INITIAL EVALUATION ADULT - PERTINENT HX OF CURRENT PROBLEM, REHAB EVAL
Filled Hx of prior stroke in 2022, hx of R carotid stent > 5 years for asymptomatic carotid disease, PE in 2022 s/p thrombectomy post-covid vaccine, on Eliquis and ASA.

## 2025-06-02 NOTE — OCCUPATIONAL THERAPY INITIAL EVALUATION ADULT - GENERAL OBSERVATIONS, REHAB EVAL
Left pt as received semifowler in bed, NAD, +IV, +Tele/, +Primafit on RA A&Ox4. Pre/post pain 0/10. Pt agreeable to OT evaluation

## 2025-06-02 NOTE — DISCHARGE NOTE PROVIDER - PROVIDER TOKENS
PROVIDER:[TOKEN:[[id number]],FOLLOWUP:[1 month]],FREE:[LAST:[Your Cardiologist],PHONE:[(   )    -],FAX:[(   )    -],FOLLOWUP:[2 weeks]],FREE:[LAST:[Your Endocrinologist],PHONE:[(   )    -],FAX:[(   )    -],FOLLOWUP:[2 weeks]],FREE:[LAST:[Your Primary Care Provider],PHONE:[(   )    -],FAX:[(   )    -],FOLLOWUP:[1 week]] PROVIDER:[TOKEN:[[id number]],FOLLOWUP:[1 month]],FREE:[LAST:[Your Cardiologist],PHONE:[(   )    -],FAX:[(   )    -],FOLLOWUP:[2 weeks]],FREE:[LAST:[Your Endocrinologist],PHONE:[(   )    -],FAX:[(   )    -],FOLLOWUP:[2 weeks]],FREE:[LAST:[Your Primary Care Provider],PHONE:[(   )    -],FAX:[(   )    -],FOLLOWUP:[1 week]],PROVIDER:[TOKEN:[[id number]]]

## 2025-06-02 NOTE — DISCHARGE NOTE PROVIDER - DISCHARGE DATE
Impression: Exudative macular degeneration, with inactive choroidal neovascularization, bilateral Plan: AREDS and a diet heavier in green leafy vegetables discussed. Signs and symptoms of WET macular degeneration were discussed (wavy vision, blurred vision). Patient instructed to call or return to clinic if condition gets worse. Discussed with patient, understands this may limit vision after surgery. 10-Hernan-2025

## 2025-06-02 NOTE — PROGRESS NOTE ADULT - SUBJECTIVE AND OBJECTIVE BOX
Preliminary note, official recommendations pending attending review/signature   Seen and examined by Stroke team attending/team, assessment/ plan as discussed with stroke team attending/team as noted.     [facility] Stroke Team  Progress Note     HPI:    [facility] Stroke Attending Note  CC: slurred speech.   HPI:  The patient is a 83 y/o F with hx of prior stroke in 2022, hx of Right carotid stent > 5 years for asymptomatic carotid disease, PE in 2022 s/p thrombectomy post-covid vaccine, on Eliquis 2.5mg BID and ASA, DM, CKD, Anemia, HLD who presents for transient episode of slurred speech and R arm weakness. Last well known last evening, woke up this AM at 7:30AM and noted that she had difficulty raising her right arm and pulling herself up.  No other focal weakness. Her children also noted she was slurring her words. Upon arrival here, no more symptoms.      Denies any prior episode.  Admits to noncompliance with Eliquis and misses nightly dose regularly.  Follows with hematology/pulm/nephro.  Has known anemia and CKD.     SUBJECTIVE: No events overnight.  No new neurologic complaints.  ROS reported negative unless otherwise noted.    acetaminophen     Tablet .. 650 milliGRAM(s) Oral every 6 hours PRN  apixaban 2.5 milliGRAM(s) Oral every 12 hours  ascorbic acid 500 milliGRAM(s) Oral daily  aspirin enteric coated 81 milliGRAM(s) Oral daily  atorvastatin 80 milliGRAM(s) Oral at bedtime  dextrose 5%. 1000 milliLiter(s) IV Continuous <Continuous>  dextrose 5%. 1000 milliLiter(s) IV Continuous <Continuous>  dextrose 50% Injectable 25 Gram(s) IV Push once  dextrose 50% Injectable 12.5 Gram(s) IV Push once  dextrose 50% Injectable 25 Gram(s) IV Push once  dextrose Oral Gel 15 Gram(s) Oral once PRN  ferrous    sulfate 325 milliGRAM(s) Oral every 8 hours  glucagon  Injectable 1 milliGRAM(s) IntraMuscular once  hydrALAZINE 25 milliGRAM(s) Oral two times a day  hydrALAZINE Injectable 10 milliGRAM(s) IV Push every 4 hours PRN  insulin lispro (ADMELOG) corrective regimen sliding scale   SubCutaneous Before meals and at bedtime  labetalol Injectable 10 milliGRAM(s) IV Push every 4 hours PRN  polyethylene glycol 3350 17 Gram(s) Oral at bedtime PRN  senna 2 Tablet(s) Oral at bedtime  sodium chloride 0.9%. 1000 milliLiter(s) IV Continuous <Continuous>    PHYSICAL EXAM:   Vital Signs Last 24 Hrs  T(C): 36.7 (02 Jun 2025 11:43), Max: 37.3 (01 Jun 2025 16:50)  T(F): 98 (02 Jun 2025 11:43), Max: 99.1 (01 Jun 2025 16:50)  HR: 85 (02 Jun 2025 11:43) (72 - 85)  BP: 157/74 (02 Jun 2025 11:43) (137/57 - 191/76)  BP(mean): 82 (02 Jun 2025 10:00) (82 - 115)  RR: 18 (02 Jun 2025 11:43) (18 - 18)  SpO2: 98% (02 Jun 2025 11:43) (98% - 100%)    Parameters below as of 02 Jun 2025 11:43  Patient On (Oxygen Delivery Method): room air    Physical Exam:  General: No acute distress.   HEENT: Head normocephalic, atraumatic. Conjunctivae clear w/o exudates or hemorrhage. Sclera non-icteric. Nares are patent bilaterally. Mucous membranes pink and moist.  No tonsillar swelling or exudates.    Neck: Supple, no adenopathy. Trachea midline. No JVD.  Cardiac: Normal rate and rhythm. S1, S2 auscultated. No murmurs, gallops, or rubs.     Respiratory: Lung sounds clear in all fields. Chest wall symmetric, nontender.   Abdominal: Soft, nondistended, nontender. Bowel sounds normoactive x 4 quadrants. No masses, hepatomegaly, or splenomegaly.   Skin: Skin is warm, dry and intact without rashes or lesions. Appropriate color for ethnicity. Nailbeds pink with no cyanosis or clubbing.   Extremities: No edema, 2+ peripheral pulses in b/l upper and b/l lower extremities. Full range of motion in all joints.     Detailed Neurologic Exam:    Mental status: The patient is awake and alert and has normal attention span.  The patient is fully oriented in 3 spheres. The patient is able to name objects, follow commands, repeat sentences.  Cranial nerves: Pupils equal and react symmetrically to light. There is no visual field deficit to confrontation. Extraocular motion is full with no nystagmus. There is no ptosis. Facial sensation is intact. Facial musculature is symmetric. Palate elevates symmetrically. Tongue is midline.  Motor: There is normal bulk and tone.  There is no tremor.  Strength is 5/5 in the right arm and leg.   Strength is 5/5 in the left arm and leg.  Sensation: Intact to light touch in 4 extremities  Cerebellar: There is no dysmetria on finger to nose testing.  Gait : deferred      LABS:                        8.1    6.25  )-----------( 264      ( 02 Jun 2025 06:42 )             28.1    06-02    141  |  105  |  27.7[H]  ----------------------------<  194[H]  4.7   |  20.0[L]  |  2.00[H]    Ca    8.9      02 Jun 2025 06:42  Phos  3.6     06-02  Mg     2.1     06-02    TPro  6.5[L]  /  Alb  3.3  /  TBili  0.4  /  DBili  0.1  /  AST  16  /  ALT  7   /  AlkPhos  84  06-02  PT/INR - ( 01 Jun 2025 09:57 )   PT: 13.7 sec;   INR: 1.21 ratio      PTT - ( 01 Jun 2025 09:57 )  PTT:27.7 sec    06-02 Chol 200[H] LDL -- HDL 38[L] Trig 171[H]    A1C: 8.7 % (06.02.25 @ 06:42)     IMAGING: Reviewed by me.     CT Brain Stroke Protocol (06.01.25 @ 09:08)  IMPRESSION:    CT HEAD: No acute intracranial findings.    CT PERFUSION: No focal perfusion deficit.    CT ANGIOGRAPHY NECK: Right carotid stent appears patent. Short-segment   moderatenarrowing proximal left cervical ICA.    CT ANGIOGRAPHY HEAD: Moderate narrowing left cavernous segment ICA.   Severe narrowing right distal petrous/proximal cavernous segment ICA.   Mild to moderate narrowing right paraclinoid segment ICA. Focal moderate   narrowing right proximal P2 segment. Focal near-complete occlusion right   distal P2 segment.    US Duplex Venous Lower Ext Complete, Bilateral (06.01.25 @ 15:39)  IMPRESSION:  No evidence of deep venous thrombosis in either lower extremity. Preliminary note, official recommendations pending attending review/signature   Seen and examined by Stroke team attending/team, assessment/ plan as discussed with stroke team attending/team as noted.     [facility] Stroke Team  Progress Note     HPI:    [facility] Stroke Attending Note  CC: slurred speech.   HPI:  The patient is a 85 y/o F with hx of prior stroke in 2022, hx of Right carotid stent > 5 years for asymptomatic carotid disease, PE in 2022 s/p thrombectomy post-covid vaccine, on Eliquis 2.5mg BID and ASA, DM, CKD, Anemia, HLD who presents for transient episode of slurred speech and R arm weakness. Last well known last evening, woke up this AM at 7:30AM and noted that she had difficulty raising her right arm and pulling herself up.  No other focal weakness. Her children also noted she was slurring her words. Upon arrival here, no more symptoms.      Denies any prior episode.  Admits to noncompliance with Eliquis and misses nightly dose regularly.  Follows with hematology/pulm/nephro.  Has known anemia and CKD.     SUBJECTIVE: No events overnight.  No new neurologic complaints.  ROS reported negative unless otherwise noted.    acetaminophen     Tablet .. 650 milliGRAM(s) Oral every 6 hours PRN  apixaban 2.5 milliGRAM(s) Oral every 12 hours  ascorbic acid 500 milliGRAM(s) Oral daily  aspirin enteric coated 81 milliGRAM(s) Oral daily  atorvastatin 80 milliGRAM(s) Oral at bedtime  dextrose 5%. 1000 milliLiter(s) IV Continuous <Continuous>  dextrose 5%. 1000 milliLiter(s) IV Continuous <Continuous>  dextrose 50% Injectable 25 Gram(s) IV Push once  dextrose 50% Injectable 12.5 Gram(s) IV Push once  dextrose 50% Injectable 25 Gram(s) IV Push once  dextrose Oral Gel 15 Gram(s) Oral once PRN  ferrous    sulfate 325 milliGRAM(s) Oral every 8 hours  glucagon  Injectable 1 milliGRAM(s) IntraMuscular once  hydrALAZINE 25 milliGRAM(s) Oral two times a day  hydrALAZINE Injectable 10 milliGRAM(s) IV Push every 4 hours PRN  insulin lispro (ADMELOG) corrective regimen sliding scale   SubCutaneous Before meals and at bedtime  labetalol Injectable 10 milliGRAM(s) IV Push every 4 hours PRN  polyethylene glycol 3350 17 Gram(s) Oral at bedtime PRN  senna 2 Tablet(s) Oral at bedtime  sodium chloride 0.9%. 1000 milliLiter(s) IV Continuous <Continuous>    PHYSICAL EXAM:   Vital Signs Last 24 Hrs  T(C): 36.7 (02 Jun 2025 11:43), Max: 37.3 (01 Jun 2025 16:50)  T(F): 98 (02 Jun 2025 11:43), Max: 99.1 (01 Jun 2025 16:50)  HR: 85 (02 Jun 2025 11:43) (72 - 85)  BP: 157/74 (02 Jun 2025 11:43) (137/57 - 191/76)  BP(mean): 82 (02 Jun 2025 10:00) (82 - 115)  RR: 18 (02 Jun 2025 11:43) (18 - 18)  SpO2: 98% (02 Jun 2025 11:43) (98% - 100%)    Parameters below as of 02 Jun 2025 11:43  Patient On (Oxygen Delivery Method): room air    Physical Exam:  General: No acute distress.   HEENT: Head normocephalic, atraumatic. Conjunctivae clear w/o exudates or hemorrhage. Sclera non-icteric. Nares are patent bilaterally. Mucous membranes pink and moist.  No tonsillar swelling or exudates.    Neck: Supple, no adenopathy. Trachea midline. No JVD.  Cardiac: Normal rate and rhythm. S1, S2 auscultated. No murmurs, gallops, or rubs.     Respiratory: Lung sounds clear in all fields. Chest wall symmetric, nontender.   Abdominal: Soft, nondistended, nontender. Bowel sounds normoactive x 4 quadrants. No masses, hepatomegaly, or splenomegaly.   Skin: Skin is warm, dry and intact without rashes or lesions. Appropriate color for ethnicity. Nailbeds pink with no cyanosis or clubbing.   Extremities: No edema, 2+ peripheral pulses in b/l upper and b/l lower extremities. Full range of motion in all joints.     Detailed Neurologic Exam:    Mental status: The patient is awake and alert and has normal attention span.  The patient is fully oriented in 3 spheres. The patient is able to name objects, follow commands, repeat sentences.  Cranial nerves: Pupils equal and react symmetrically to light. There is no visual field deficit to confrontation. Extraocular motion is full with no nystagmus. There is no ptosis. Facial sensation is intact. Facial musculature is symmetric. Palate elevates symmetrically. Tongue is midline.  Motor: There is normal bulk and tone.  There is no tremor.  Strength is 5/5 in the right arm and leg.   Strength is 5/5 in the left arm and leg.  Sensation: Intact to light touch in 4 extremities  Cerebellar: There is no dysmetria on finger to nose testing.  Gait : deferred      LABS:                        8.1    6.25  )-----------( 264      ( 02 Jun 2025 06:42 )             28.1    06-02    141  |  105  |  27.7[H]  ----------------------------<  194[H]  4.7   |  20.0[L]  |  2.00[H]    Ca    8.9      02 Jun 2025 06:42  Phos  3.6     06-02  Mg     2.1     06-02    TPro  6.5[L]  /  Alb  3.3  /  TBili  0.4  /  DBili  0.1  /  AST  16  /  ALT  7   /  AlkPhos  84  06-02  PT/INR - ( 01 Jun 2025 09:57 )   PT: 13.7 sec;   INR: 1.21 ratio      PTT - ( 01 Jun 2025 09:57 )  PTT:27.7 sec    06-02 Chol 200[H] LDL -- HDL 38[L] Trig 171[H]    A1C: 8.7 % (06.02.25 @ 06:42)     IMAGING: Reviewed by me.     CT Brain Stroke Protocol (06.01.25 @ 09:08)  IMPRESSION:    CT HEAD: No acute intracranial findings.    CT PERFUSION: No focal perfusion deficit.    CT ANGIOGRAPHY NECK: Right carotid stent appears patent. Short-segment   moderatenarrowing proximal left cervical ICA.    CT ANGIOGRAPHY HEAD: Moderate narrowing left cavernous segment ICA.   Severe narrowing right distal petrous/proximal cavernous segment ICA.   Mild to moderate narrowing right paraclinoid segment ICA. Focal moderate   narrowing right proximal P2 segment. Focal near-complete occlusion right   distal P2 segment.    US Duplex Venous Lower Ext Complete, Bilateral (06.01.25 @ 15:39)  IMPRESSION:  No evidence of deep venous thrombosis in either lower extremity.    TTE W or WO Ultrasound Enhancing Agent (06.01.25 @ 13:50)  CONCLUSIONS:      1. Left ventricular systolic function is normal with an ejection fraction visually estimated at 65 %. There are no regional wall motion abnormalities seen.   2. Mild left ventricular hypertrophy.   3. There is mild (grade 1) left ventricular diastolic dysfunction, with normal left ventricular filling pressure.   4. Normal right ventricular cavity size.   5. Left atrium is normal in size.   6. There is mild posterior calcification of the mitral valve annulus.   7. Estimatedpulmonary artery systolic pressure is 22 mmHg.   8. Fibrocalcific aortic valve sclerosis without stenosis.   9. The interatrial septum appears intact.  10. No pericardial effusion seen.      [facility] Stroke Team  Progress Note     HPI:    [facility] Stroke Attending Note  CC: slurred speech.   HPI:  The patient is a 83 y/o F with hx of prior stroke in 2022, hx of Right carotid stent > 5 years for asymptomatic carotid disease, PE in 2022 s/p thrombectomy post-covid vaccine, on Eliquis 2.5mg BID and ASA, DM, CKD, Anemia, HLD who presents for transient episode of slurred speech and R arm weakness. Last well known last evening, woke up this AM at 7:30AM and noted that she had difficulty raising her right arm and pulling herself up.  No other focal weakness. Her children also noted she was slurring her words. Upon arrival here, no more symptoms.      Denies any prior episode.  Admits to noncompliance with Eliquis and misses nightly dose regularly.  Follows with hematology/pulm/nephro.  Has known anemia and CKD.     SUBJECTIVE: No events overnight.  No new neurologic complaints.  ROS reported negative unless otherwise noted.    acetaminophen     Tablet .. 650 milliGRAM(s) Oral every 6 hours PRN  apixaban 2.5 milliGRAM(s) Oral every 12 hours  ascorbic acid 500 milliGRAM(s) Oral daily  aspirin enteric coated 81 milliGRAM(s) Oral daily  atorvastatin 80 milliGRAM(s) Oral at bedtime  dextrose 5%. 1000 milliLiter(s) IV Continuous <Continuous>  dextrose 5%. 1000 milliLiter(s) IV Continuous <Continuous>  dextrose 50% Injectable 25 Gram(s) IV Push once  dextrose 50% Injectable 12.5 Gram(s) IV Push once  dextrose 50% Injectable 25 Gram(s) IV Push once  dextrose Oral Gel 15 Gram(s) Oral once PRN  ferrous    sulfate 325 milliGRAM(s) Oral every 8 hours  glucagon  Injectable 1 milliGRAM(s) IntraMuscular once  hydrALAZINE 25 milliGRAM(s) Oral two times a day  hydrALAZINE Injectable 10 milliGRAM(s) IV Push every 4 hours PRN  insulin lispro (ADMELOG) corrective regimen sliding scale   SubCutaneous Before meals and at bedtime  labetalol Injectable 10 milliGRAM(s) IV Push every 4 hours PRN  polyethylene glycol 3350 17 Gram(s) Oral at bedtime PRN  senna 2 Tablet(s) Oral at bedtime  sodium chloride 0.9%. 1000 milliLiter(s) IV Continuous <Continuous>    PHYSICAL EXAM:   Vital Signs Last 24 Hrs  T(C): 36.7 (02 Jun 2025 11:43), Max: 37.3 (01 Jun 2025 16:50)  T(F): 98 (02 Jun 2025 11:43), Max: 99.1 (01 Jun 2025 16:50)  HR: 85 (02 Jun 2025 11:43) (72 - 85)  BP: 157/74 (02 Jun 2025 11:43) (137/57 - 191/76)  BP(mean): 82 (02 Jun 2025 10:00) (82 - 115)  RR: 18 (02 Jun 2025 11:43) (18 - 18)  SpO2: 98% (02 Jun 2025 11:43) (98% - 100%)    Parameters below as of 02 Jun 2025 11:43  Patient On (Oxygen Delivery Method): room air    Physical Exam:  General: No acute distress.   HEENT: Head normocephalic, atraumatic. Conjunctivae clear w/o exudates or hemorrhage. Sclera non-icteric. Nares are patent bilaterally. Mucous membranes pink and moist.  No tonsillar swelling or exudates.    Neck: Supple, no adenopathy. Trachea midline. No JVD.  Cardiac: Normal rate and rhythm. S1, S2 auscultated. No murmurs, gallops, or rubs.     Respiratory: Lung sounds clear in all fields. Chest wall symmetric, nontender.   Abdominal: Soft, nondistended, nontender. Bowel sounds normoactive x 4 quadrants. No masses, hepatomegaly, or splenomegaly.   Skin: Skin is warm, dry and intact without rashes or lesions. Appropriate color for ethnicity. Nailbeds pink with no cyanosis or clubbing.   Extremities: No edema, 2+ peripheral pulses in b/l upper and b/l lower extremities. Full range of motion in all joints.     Detailed Neurologic Exam:    Mental status: The patient is awake and alert and has normal attention span.  The patient is fully oriented in 3 spheres. The patient is able to name objects, follow commands, repeat sentences.  Cranial nerves: Pupils equal and react symmetrically to light. There is no visual field deficit to confrontation. Extraocular motion is full with no nystagmus. There is no ptosis. Facial sensation is intact. Facial musculature is symmetric. Palate elevates symmetrically. Tongue is midline.  Motor: There is normal bulk and tone.  There is no tremor.  Strength is 5/5 in the right arm and leg.   Strength is 5/5 in the left arm and leg.  Sensation: Intact to light touch in 4 extremities  Cerebellar: There is no dysmetria on finger to nose testing.  Gait : deferred      LABS:                        8.1    6.25  )-----------( 264      ( 02 Jun 2025 06:42 )             28.1    06-02    141  |  105  |  27.7[H]  ----------------------------<  194[H]  4.7   |  20.0[L]  |  2.00[H]    Ca    8.9      02 Jun 2025 06:42  Phos  3.6     06-02  Mg     2.1     06-02    TPro  6.5[L]  /  Alb  3.3  /  TBili  0.4  /  DBili  0.1  /  AST  16  /  ALT  7   /  AlkPhos  84  06-02  PT/INR - ( 01 Jun 2025 09:57 )   PT: 13.7 sec;   INR: 1.21 ratio      PTT - ( 01 Jun 2025 09:57 )  PTT:27.7 sec    06-02 Chol 200[H] LDL -- HDL 38[L] Trig 171[H]    A1C: 8.7 % (06.02.25 @ 06:42)     IMAGING: Reviewed by me.     CT Brain Stroke Protocol (06.01.25 @ 09:08)  IMPRESSION:    CT HEAD: No acute intracranial findings.    CT PERFUSION: No focal perfusion deficit.    CT ANGIOGRAPHY NECK: Right carotid stent appears patent. Short-segment   moderatenarrowing proximal left cervical ICA.    CT ANGIOGRAPHY HEAD: Moderate narrowing left cavernous segment ICA.   Severe narrowing right distal petrous/proximal cavernous segment ICA.   Mild to moderate narrowing right paraclinoid segment ICA. Focal moderate   narrowing right proximal P2 segment. Focal near-complete occlusion right   distal P2 segment.    US Duplex Venous Lower Ext Complete, Bilateral (06.01.25 @ 15:39)  IMPRESSION:  No evidence of deep venous thrombosis in either lower extremity.    TTE W or WO Ultrasound Enhancing Agent (06.01.25 @ 13:50)  CONCLUSIONS:      1. Left ventricular systolic function is normal with an ejection fraction visually estimated at 65 %. There are no regional wall motion abnormalities seen.   2. Mild left ventricular hypertrophy.   3. There is mild (grade 1) left ventricular diastolic dysfunction, with normal left ventricular filling pressure.   4. Normal right ventricular cavity size.   5. Left atrium is normal in size.   6. There is mild posterior calcification of the mitral valve annulus.   7. Estimatedpulmonary artery systolic pressure is 22 mmHg.   8. Fibrocalcific aortic valve sclerosis without stenosis.   9. The interatrial septum appears intact.  10. No pericardial effusion seen.

## 2025-06-02 NOTE — CONSULT NOTE ADULT - SUBJECTIVE AND OBJECTIVE BOX
85 y/o F with hx of prior stroke in 2022, hx of Right carotid stent > 5 years for asymptomatic carotid disease, PE in 2022 s/p thrombectomy post-covid vaccine, on Eliquis 2.5mg BID and ASA, DM, CKD, Anemia, HLD who presents for transient episode of slurred speech and R arm weakness, at 7:30AM and noted that she had difficulty raising her right arm and pulling herself up.  No other focal weakness. Her children also noted she was slurring her words. Upon arrival here, no more symptoms, denies any prior episode.  Admits to noncompliance with Eliquis and misses nightly dose regularly.  Follows with hematology/pulm/nephro.  Has known anemia and CKD, she had Imaging done in the ED showed CT HEAD: No acute intracranial findings, CT PERFUSION: No focal perfusion deficit, CT ANGIOGRAPHY NECK: Right carotid stent appears patent. Short-segment moderate narrowing proximal left cervical ICA.  CT ANGIOGRAPHY HEAD: Moderate narrowing left cavernous segment ICA. Severe narrowing right distal petrous/proximal cavernous segment ICA.   Mild to moderate narrowing right paraclinoid segment ICA. Focal moderate narrowing right proximal P2 segment. Focal near-complete occlusion right   distal P2 segment, patient is admitted under stroke team, Medicine consulted for Medical Management      PAST MEDICAL & SURGICAL HISTORY:  HTN (hypertension)      HLD (hyperlipidemia)      DM (diabetes mellitus)    Allergies:   No Known Allergies  no Intolerances    SOCIAL HISTORY:  no smoking    no alcohol   no drugs     83 y/o F with hx of prior stroke in 2022, hx of Right carotid stent > 5 years for asymptomatic carotid disease, PE in 2022 s/p thrombectomy post-covid vaccine, on Eliquis 2.5mg BID and ASA, DM, CKD, Anemia, HLD who presents for transient episode of slurred speech and R arm weakness, at 7:30AM and noted that she had difficulty raising her right arm and pulling herself up.  No other focal weakness. Her children also noted she was slurring her words. Upon arrival here, no more symptoms, denies any prior episode.  Admits to noncompliance with Eliquis and misses nightly dose regularly.  Follows with hematology/pulm/nephro.  Has known anemia and CKD, she had Imaging done in the ED showed CT HEAD: No acute intracranial findings, CT PERFUSION: No focal perfusion deficit, CT ANGIOGRAPHY NECK: Right carotid stent appears patent. Short-segment moderate narrowing proximal left cervical ICA.  CT ANGIOGRAPHY HEAD: Moderate narrowing left cavernous segment ICA. Severe narrowing right distal petrous/proximal cavernous segment ICA. Mild to moderate narrowing right paraclinoid segment ICA. Focal moderate narrowing right proximal P2 segment. Focal near-complete occlusion right distal P2 segment, patient is admitted under stroke team, Medicine consulted for Medical Management      PAST MEDICAL & SURGICAL HISTORY:  HTN (hypertension)      HLD (hyperlipidemia)      DM (diabetes mellitus)    Allergies:   No Known Allergies  no Intolerances    SOCIAL HISTORY:  no smoking    no alcohol   no drugs        Vital Signs Last 24 Hrs  T(C): 36.9 (02 Jun 2025 15:47), Max: 36.9 (01 Jun 2025 23:22)  T(F): 98.4 (02 Jun 2025 15:47), Max: 98.4 (01 Jun 2025 23:22)  HR: 91 (02 Jun 2025 15:47) (75 - 91)  BP: 178/76 (02 Jun 2025 15:47) (137/57 - 191/76)  BP(mean): 82 (02 Jun 2025 10:00) (82 - 114)  RR: 18 (02 Jun 2025 15:47) (18 - 18)  SpO2: 98% (02 Jun 2025 15:47) (98% - 99%)    Parameters below as of 02 Jun 2025 15:47  Patient On (Oxygen Delivery Method): room air        PHYSICAL EXAM:    GENERAL: Elderly female looking comfortable  HEENT: PERRL, +EOMI  NECK: soft, Supple, No JVD  CHEST/LUNG: Clear to auscultate bilaterally; No wheezing  HEART: S1S2+, Regular rate and rhythm; No murmurs  ABDOMEN: Soft, Nontender, Nondistended; Bowel sounds present  EXTREMITIES:  1+ Peripheral Pulses, No edema  SKIN: No rashes or lesions  NEURO: AAOX3  PSYCH: normal mood      LABS:                        8.1    6.25  )-----------( 264      ( 02 Jun 2025 06:42 )             28.1     06-02    141  |  105  |  27.7[H]  ----------------------------<  194[H]  4.7   |  20.0[L]  |  2.00[H]    Ca    8.9      02 Jun 2025 06:42  Phos  3.6     06-02  Mg     2.1     06-02    TPro  6.5[L]  /  Alb  3.3  /  TBili  0.4  /  DBili  0.1  /  AST  16  /  ALT  7   /  AlkPhos  84  06-02    PT/INR - ( 01 Jun 2025 09:57 )   PT: 13.7 sec;   INR: 1.21 ratio         PTT - ( 01 Jun 2025 09:57 )  PTT:27.7 sec        I&O's Summary      MEDICATIONS  (STANDING):  apixaban 2.5 milliGRAM(s) Oral every 12 hours  ascorbic acid 500 milliGRAM(s) Oral daily  aspirin enteric coated 81 milliGRAM(s) Oral daily  atorvastatin 80 milliGRAM(s) Oral at bedtime  dextrose 5%. 1000 milliLiter(s) (100 mL/Hr) IV Continuous <Continuous>  dextrose 5%. 1000 milliLiter(s) (50 mL/Hr) IV Continuous <Continuous>  dextrose 50% Injectable 25 Gram(s) IV Push once  dextrose 50% Injectable 12.5 Gram(s) IV Push once  dextrose 50% Injectable 25 Gram(s) IV Push once  ferrous    sulfate 325 milliGRAM(s) Oral every 8 hours  glucagon  Injectable 1 milliGRAM(s) IntraMuscular once  hydrALAZINE 25 milliGRAM(s) Oral two times a day  insulin glargine Injectable (LANTUS) 12 Unit(s) SubCutaneous at bedtime  insulin lispro (ADMELOG) corrective regimen sliding scale   SubCutaneous Before meals and at bedtime  senna 2 Tablet(s) Oral at bedtime  sodium chloride 0.9%. 1000 milliLiter(s) (75 mL/Hr) IV Continuous <Continuous>    MEDICATIONS  (PRN):  acetaminophen     Tablet .. 650 milliGRAM(s) Oral every 6 hours PRN Temp greater or equal to 38C (100.4F), Moderate Pain (4 - 6)  dextrose Oral Gel 15 Gram(s) Oral once PRN Blood Glucose LESS THAN 70 milliGRAM(s)/deciliter  hydrALAZINE Injectable 10 milliGRAM(s) IV Push every 4 hours   labetalol Injectable 10 milliGRAM(s) IV Push every 4 hours PRN SBP > 190  polyethylene glycol 3350 17 Gram(s) Oral at bedtime PRN Constipation

## 2025-06-02 NOTE — DISCHARGE NOTE PROVIDER - REASON FOR ADMISSION
TIA
You can access the FollowMyHealth Patient Portal offered by [facility] by registering at the following website: [email or web address]. By joining [facility]’s FollowMyHealth portal, you will also be able to view your health information using other applications (apps) compatible with our system.

## 2025-06-02 NOTE — DISCHARGE NOTE PROVIDER - CARE PROVIDERS DIRECT ADDRESSES
,[email or web address],DirectAddress_Unknown,DirectAddress_Unknown,DirectAddress_Unknown [email or web address],DirectAddress_Unknown,DirectAddress_Unknown,DirectAddress_Unknown,DirectAddress_Unknown

## 2025-06-02 NOTE — PHYSICAL THERAPY INITIAL EVALUATION ADULT - GAIT PATTERN USED, PT EVAL
Time  8836-2229    Pt alert and oriented x4. C/o sore throat, PRN throat lozenge provided. Pt also complaining of generalized pain and heartburn. Cross cover notified, PRN GI cocktail and naproxen ordered. Pt stating he is allergic to tylenol and that it causes itching. MD notified and tylenol discontinued, added to allergy list. Pt continues on oral penicillin for strep infection. BG this evening 103. Insulin held (novolog was held at all three meals today). Pt is up independently in room, voiding WNL in bedside urinal. Plan for possible discharge back to [facility] tomorrow if medically ready.   3-point gait

## 2025-06-02 NOTE — DISCHARGE NOTE PROVIDER - ATTENDING DISCHARGE PHYSICAL EXAMINATION:
pt is seen in am , she is feeling well   Vital Signs Last 24 Hrs  T(C): 36.8 (10 Hernan 2025 07:57), Max: 37.6 (09 Jun 2025 15:34)  T(F): 98.3 (10 Hernan 2025 07:57), Max: 99.7 (09 Jun 2025 15:34)  HR: 71 (10 Hernan 2025 07:57) (67 - 81)  BP: 131/62 (10 Hernan 2025 07:57) (114/63 - 158/54)  BP(mean): --  RR: 18 (10 Hernan 2025 07:57) (17 - 18)  SpO2: 97% (10 Hernan 2025 07:57) (94% - 99%)    Parameters below as of 10 Hernan 2025 07:57  Patient On (Oxygen Delivery Method): room air

## 2025-06-02 NOTE — DISCHARGE NOTE PROVIDER - NSDCMRMEDTOKEN_GEN_ALL_CORE_FT
aspirin 81 mg oral tablet: 1 orally once a day (at bedtime)  atenolol 100 mg oral tablet: 1 tab(s) orally once a day  atorvastatin 80 mg oral tablet: 1 tab(s) orally once a day (at bedtime)  Eliquis 2.5 mg oral tablet: 1 tab(s) orally 2 times a day  ferrous sulfate: 1 tab(s) orally once a day  glipiZIDE 10 mg oral tablet: 1 tab(s) orally once a day  hydroCHLOROthiazide 25 mg oral tablet: 1 tab(s) orally once a day  metFORMIN 500 mg oral tablet: 1 tab(s) orally 2 times a day  Protonix 40 mg oral delayed release tablet: 1 tab(s) orally once a day  Vitamin C 500 mg oral capsule: 1 cap(s) orally once a day   acetaminophen 325 mg oral tablet: 2 tab(s) orally every 6 hours As needed Temp greater or equal to 38C (100.4F), Moderate Pain (4 - 6)  aspirin 81 mg oral tablet: 1 orally once a day (at bedtime)  atenolol 100 mg oral tablet: 1 tab(s) orally once a day  atorvastatin 80 mg oral tablet: 1 tab(s) orally once a day (at bedtime)  Eliquis 2.5 mg oral tablet: 1 tab(s) orally 2 times a day  ferrous sulfate: 1 tab(s) orally once a day  glipiZIDE 10 mg oral tablet: 1 tab(s) orally once a day  hydrALAZINE 100 mg oral tablet: 1 tab(s) orally every 8 hours  polyethylene glycol 3350 oral powder for reconstitution: 17 gram(s) orally once a day (at bedtime) As needed Constipation  Protonix 40 mg oral delayed release tablet: 1 tab(s) orally once a day  senna leaf extract oral tablet: 2 tab(s) orally once a day (at bedtime)  sodium bicarbonate 650 mg oral tablet: 1 tab(s) orally 3 times a day  Vitamin C 500 mg oral capsule: 1 cap(s) orally once a day

## 2025-06-02 NOTE — OCCUPATIONAL THERAPY INITIAL EVALUATION ADULT - BED MOBILITY/TRANSFERS, PREVIOUS LEVEL OF FUNCTION, OT EVAL
DVT and gI PPX Pt reports furniture surfing in the home, RW for community mobility/independent/needs device

## 2025-06-02 NOTE — DISCHARGE NOTE PROVIDER - HOSPITAL COURSE
83 y/o F with hx of prior stroke in 2022, hx of Right carotid stent > 5 years for asymptomatic carotid disease, PE in 2022 s/p thrombectomy post-covid vaccine, on Eliquis 2.5mg BID and ASA who presents for transient episode of slurred speech and R arm weakness. CT head neg. 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      Impression: High risk TIA.    **********LAST UPDATED 6/2***********    Hospital course notable for:  -ANTITHROMBOTIC THERAPY: Eliquis 2.5mg BID and ASA 81mg daily    -: initiated atorvastatin 80 mg daily    -MRI Brain w/o pending +++++++++++    -Possible Neuro IR consultation pending MRI results +++++++++    -HTN: HCTZ d/c'd due to elevated Cr. Resumed home dose Atenolol 100mg qd.     -JENNIFER on CKD (baseline Cr reported to be ~2): renal US pending +++++++    -Anemia of Chronic Disease: iron studies pending +++++++. Outpatient f/u with Hematologist.    -A1C 8.7 (pt reports improved from 11): initiated Lantus 12units qhs. Outpatient f/u with Endocrinologist.     -UTI: course of Rocephin x3d      CORE MEASURES:        Admission NIHSS: 0     Tenecteplase : [] YES [x] NO      LDL/HDL/A1C: 131/38/8.7     Depression Screen - if depression hx and/or present      Statin Therapy: atorvastatin 80     Dysphagia Screen: [x] PASS [] FAIL     Smoking [] YES [x] NO      Afib [] YES [x] NO     Stroke Education [x] YES [] NO    85 y/o F with hx of prior stroke in 2022, hx of Right carotid stent > 5 years for asymptomatic carotid disease, PE in 2022 s/p thrombectomy post-covid vaccine, on Eliquis 2.5mg BID and ASA who presented for transient episode of slurred speech and R arm weakness. CT head neg. 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  Has known anemia and CKD, she had Imaging done in the ED showed CT HEAD: No acute intracranial findings, CT PERFUSION: No focal perfusion deficit, CT ANGIOGRAPHY NECK: Right carotid stent appears patent. Short-segment moderate narrowing proximal left cervical ICA. CT ANGIOGRAPHY HEAD: Moderate narrowing left cavernous segment ICA. Severe narrowing right distal petrous/proximal cavernous segment ICA. Mild to moderate narrowing right paraclinoid segment ICA. Focal moderate narrowing right proximal P2 segment. Focal near-complete occlusion right distal P2 segment, patient was admitted under stroke team. Medicine originally consulted for Medical Management. Patient admits to noncompliance with Eliquis and misses nightly dose regularly. MRI with no acute stroke. Patient was transferred to medical service. Nephrology consulted for JENNIFER on CKD III - Prior creat 1.8, increased to 2.65 .Metformin and Enalapril held.  No hydro noted on UDS 6/3/25 . Patient is S/P CTA 6/1/25 , therefore suspect episode RCN / ATN. Received sodium bicarb. CR improving. Completed course of IV Rocephin for suspected UTI. Patient received one unit PRBC for acute on Chronic Anemia. The patient was seen by PT and recommend          85 y/o F with hx of prior stroke in 2022, hx of Right carotid stent > 5 years for asymptomatic carotid disease, PE in 2022 s/p thrombectomy post-covid vaccine, on Eliquis 2.5mg BID and ASA who presented for transient episode of slurred speech and R arm weakness. CT head neg. 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  Has known anemia and CKD, she had Imaging done in the ED showed CT HEAD: No acute intracranial findings, CT PERFUSION: No focal perfusion deficit, CT ANGIOGRAPHY NECK: Right carotid stent appears patent. Short-segment moderate narrowing proximal left cervical ICA. CT ANGIOGRAPHY HEAD: Moderate narrowing left cavernous segment ICA. Severe narrowing right distal petrous/proximal cavernous segment ICA. Mild to moderate narrowing right paraclinoid segment ICA. Focal moderate narrowing right proximal P2 segment. Focal near-complete occlusion right distal P2 segment, patient was admitted under stroke team. Medicine originally consulted for Medical Management. Patient admits to noncompliance with Eliquis and misses nightly dose regularly. MRI with no acute stroke. Patient was transferred to medical service. Nephrology consulted for JENNIFER on CKD III - Prior creat 1.8, increased to 2.65 .Metformin and Enalapril held.  No hydro noted on UDS 6/3/25 . Patient is S/P CTA 6/1/25 , therefore suspect episode RCN / ATN. Received sodium bicarb. CR improving. Completed course of IV Rocephin for suspected UTI. Patient received one unit PRBC for acute on Chronic Anemia. The patient was seen by PT and recommend home

## 2025-06-02 NOTE — PROGRESS NOTE ADULT - ASSESSMENT
85 y/o F with hx of prior stroke in 2022, hx of Right carotid stent > 5 years for asymptomatic carotid disease, PE in 2022 s/p thrombectomy post-covid vaccine, on Eliquis 2.5mg BID and ASA who presents for transient episode of slurred speech and R arm weakness.  CT head neg. 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      Impression: High risk TIA    NEURO: TIA  -Neurologically nonfocal  -Continue close monitoring for neurologic deterioration    -Stroke neuro checks q 4 hours    --160  -ANTITHROMBOTIC THERAPY: Continues Eliquis 2.5mg BID and ASA 81mg daily  - titrate statin to LDL goal less than 70--start Lipitor  -MRI Brain w/o  - Possible Neuro IR consultation  -Dysphagia screen: pass/fail   -Physical therapy/OT/Speech eval/treatment.     CARDIOVASCULAR: HTN, HLD  - EKG Pending   - TTE: Pending  - Continuous cardiac monitoring for arrhythmias   - Blood pressure and antiplatelet/anticoagulation plan as above               Heme:  Anemia of chronic disease  - follow Hb  - outpatient f/u with hematology    ENDO: DM with hyperglycemia  - HbA1c: pending  - continue regular fingersticks   - continue insulin sliding scale   - monitor glucose levels     PULMONARY:   - protecting airway, saturating well     RENAL: Acute on  CKD  - Gentle hydration  - monitor urine output, maintain adequate hydration    - Na Goal:  135-145  - monitor metabolic panels   - Brooke: n/a    ID:   - Afebrile, no leukocytosis, monitor for si/sx of infection   - monitor temperature curve   - UA pending    GI/Nutrition: VEENA  - dysphagia screen   - continue bowel regimen       Skin/Musk:  - recurrent evaluations for evidence of skin breakdown     OTHER:  condition and plan of care d/w patient, questions and concerns addressed.     DISPOSITION: Rehab or home depending on PT eval once stable and workup is complete      CORE MEASURES:        Admission NIHSS:      Tenecteplase : [] YES [] NO      LDL/HDL/A1C:     Depression Screen- if depression hx and/or present      Statin Therapy:     Dysphagia Screen: [] PASS [] FAIL     Smoking [] YES [] NO      Afib [] YES [] NO     Stroke Education [] YES [] NO    Obtain screening lower extremity venous ultrasound in patients who meet 1 or more of the following criteria as patient is high risk for DVT/PE on admission:   [] History of DVT/PE  []Hypercoagulable states (Factor V Leiden, Cancer, OCP, etc. )  []Prolonged immobility (hemiplegia/hemiparesis/post operative or any other extended immobilization)  [] Transferred from outside facility (Rehab or Long term care)  [] Age </= to 50 83 y/o F with hx of prior stroke in 2022, hx of Right carotid stent > 5 years for asymptomatic carotid disease, PE in 2022 s/p thrombectomy post-covid vaccine, on Eliquis 2.5mg BID and ASA who presents for transient episode of slurred speech and R arm weakness.  CT head neg. 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      Impression: High risk TIA    NEURO: TIA  -Neurologically nonfocal  -Continue close monitoring for neurologic deterioration    -Stroke neuro checks q 4 hours    --160  -ANTITHROMBOTIC THERAPY: Continues Eliquis 2.5mg BID and ASA 81mg daily  - - started on atorvastatin 80 mg daily  -MRI Brain w/o  -Possible Neuro IR consultation  -Dysphagia screen: passed   -Physical therapy/OT/Speech eval/treatment.     CARDIOVASCULAR: HTN, HLD  -TTE: as above  -Continuous cardiac monitoring for arrhythmias   -Blood pressure and antiplatelet/anticoagulation plan as above               Heme: Anemia of chronic disease  - follow Hb  - outpatient f/u with hematology    ENDO: DM with hyperglycemia  - HbA1c: 8.7  - continue regular fingersticks   - continue insulin sliding scale   - monitor glucose levels   - f/u outpatient with endocrinologist (pt reports improved a1c from 11)    PULMONARY:   - protecting airway, saturating well     RENAL: Acute on CKD  - Gentle hydration  - monitor urine output, maintain adequate hydration    - Na Goal:  135-145  - monitor metabolic panels   - Brooke: n/a    ID:   - Afebrile, no leukocytosis, monitor for si/sx of infection   - monitor temperature curve   - UA pending    GI/Nutrition:   - continue bowel regimen     Skin/Musk:  - recurrent evaluations for evidence of skin breakdown     OTHER:  condition and plan of care d/w patient, questions and concerns addressed.     DISPOSITION: Rehab or home depending on PT eval once stable and workup is complete    CORE MEASURES:        Admission NIHSS:      Tenecteplase : [] YES [] NO      LDL/HDL/A1C:     Depression Screen- if depression hx and/or present      Statin Therapy:     Dysphagia Screen: [] PASS [] FAIL     Smoking [] YES [] NO      Afib [] YES [] NO     Stroke Education [] YES [] NO    Obtain screening lower extremity venous ultrasound in patients who meet 1 or more of the following criteria as patient is high risk for DVT/PE on admission:   [] History of DVT/PE  []Hypercoagulable states (Factor V Leiden, Cancer, OCP, etc. )  []Prolonged immobility (hemiplegia/hemiparesis/post operative or any other extended immobilization)  [] Transferred from outside facility (Rehab or Long term care)  [] Age </= to 50 83 y/o F with hx of prior stroke in 2022, hx of Right carotid stent > 5 years for asymptomatic carotid disease, PE in 2022 s/p thrombectomy post-covid vaccine, on Eliquis 2.5mg BID and ASA who presents for transient episode of slurred speech and R arm weakness.  CT head neg. CTA shows patent UUM stent, but has moderate extra and intracranial LICA disease and intracranial PCA stenosis bilaterally.      Impression: High risk TIA    NEURO: TIA  -Neurologically nonfocal  -Continue close monitoring for neurologic deterioration    -Stroke neuro checks q 4 hours    --160 - d/c HCTZ, start hydralazine 25 mg BID  -ANTITHROMBOTIC THERAPY: Continues Eliquis 2.5mg BID and ASA 81mg daily  - - started on atorvastatin 80 mg daily  -MRI Brain w/o  -Possible Neuro IR consultation pending MRI results  -Dysphagia screen: passed   -Physical therapy/OT/Speech eval/treatment.     CARDIOVASCULAR: HTN, HLD  -TTE: as above  -Continuous cardiac monitoring for arrhythmias   -Blood pressure and antiplatelet/anticoagulation plan as above               Heme: Anemia of chronic disease  - follow Hb  - outpatient f/u with hematology    ENDO: DM with hyperglycemia  - HbA1c: 8.7  - continue regular fingersticks   - continue insulin sliding scale   - monitor glucose levels   - f/u outpatient with endocrinologist (pt reports improved a1c from 11)    PULMONARY:   - protecting airway, saturating well     RENAL: Acute on CKD  - Gentle hydration  - monitor urine output, maintain adequate hydration    - Na Goal:  135-145  - monitor metabolic panels   - Brooke: n/a    ID:   - Afebrile, no leukocytosis, monitor for si/sx of infection   - monitor temperature curve   - UA pending    GI/Nutrition:   - continue bowel regimen     Skin/Musk:  - recurrent evaluations for evidence of skin breakdown     OTHER:  condition and plan of care d/w patient, questions and concerns addressed.     DISPOSITION: Rehab or home depending on PT eval once stable and workup is complete    CORE MEASURES:        Admission NIHSS: 0     Tenecteplase : [] YES [x] NO      LDL/HDL/A1C: 131/38/8.7     Depression Screen- if depression hx and/or present      Statin Therapy: Y     Dysphagia Screen: [x] PASS [] FAIL     Smoking [] YES [x] NO      Afib [] YES [x] NO     Stroke Education [] YES [] NO pending    Obtain screening lower extremity venous ultrasound in patients who meet 1 or more of the following criteria as patient is high risk for DVT/PE on admission:   [] History of DVT/PE  []Hypercoagulable states (Factor V Leiden, Cancer, OCP, etc. )  []Prolonged immobility (hemiplegia/hemiparesis/post operative or any other extended immobilization)  [] Transferred from outside facility (Rehab or Long term care)  [] Age </= to 50 83 y/o F with hx of prior stroke in 2022, hx of Right carotid stent > 5 years for asymptomatic carotid disease, PE in 2022 s/p thrombectomy post-covid vaccine, on Eliquis 2.5mg BID and ASA who presents for transient episode of slurred speech and R arm weakness.  CT head neg. 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      Impression: High risk TIA    NEURO: TIA  -Neurologically nonfocal  -Continue close monitoring for neurologic deterioration    -Stroke neuro checks q 4 hours    --160 - d/c HCTZ, start hydralazine 25 mg BID  -ANTITHROMBOTIC THERAPY: Continues Eliquis 2.5mg BID and ASA 81mg daily  - - started on atorvastatin 80 mg daily  -MRI Brain w/o  -Possible Neuro IR consultation pending MRI results  -Dysphagia screen: passed   -Physical therapy/OT/Speech eval/treatment.     CARDIOVASCULAR: HTN, HLD  -TTE: as above  -Continuous cardiac monitoring for arrhythmias   -Blood pressure and antiplatelet/anticoagulation plan as above               Heme: Anemia of chronic disease  - follow Hb  - outpatient f/u with hematology    ENDO: DM with hyperglycemia  - HbA1c: 8.7  - continue regular fingersticks   - continue insulin sliding scale   - monitor glucose levels   - f/u outpatient with endocrinologist (pt reports improved a1c from 11)    PULMONARY:   - protecting airway, saturating well     RENAL: Acute on CKD  - Gentle hydration  - monitor urine output, maintain adequate hydration    - Na Goal:  135-145  - monitor metabolic panels   - Brooke: n/a    ID:   - Afebrile, monitor for si/sx of infection   - monitor temperature curve   - UA done, positive leukocytosis started on Rocephin    GI/Nutrition:   - continue bowel regimen     Skin/Musk:  - recurrent evaluations for evidence of skin breakdown     OTHER:  condition and plan of care d/w patient, questions and concerns addressed.     DISPOSITION: Rehab or home depending on PT eval once stable and workup is complete    CORE MEASURES:        Admission NIHSS: 0     Tenecteplase : [] YES [x] NO      LDL/HDL/A1C: 131/38/8.7     Depression Screen- if depression hx and/or present      Statin Therapy: Y     Dysphagia Screen: [x] PASS [] FAIL     Smoking [] YES [x] NO      Afib [] YES [x] NO     Stroke Education [] YES [] NO pending    Obtain screening lower extremity venous ultrasound in patients who meet 1 or more of the following criteria as patient is high risk for DVT/PE on admission:   [] History of DVT/PE  []Hypercoagulable states (Factor V Leiden, Cancer, OCP, etc. )  []Prolonged immobility (hemiplegia/hemiparesis/post operative or any other extended immobilization)  [] Transferred from outside facility (Rehab or Long term care)  [] Age </= to 50

## 2025-06-02 NOTE — DISCHARGE NOTE PROVIDER - NSDCCPCAREPLAN_GEN_ALL_CORE_FT
PRINCIPAL DISCHARGE DIAGNOSIS  Diagnosis: TIA (transient ischemic attack)  Assessment and Plan of Treatment: You were admitted to the hospital because you had a TIA, transient ischemic attack.   A transient ischemic attack (TIA) is often called a "mini-stroke." It happens when there is a temporary blockage of blood flow to the brain, which causes stroke-like symptoms that usually last for a few minutes to an hour and then go away completely. Unlike a full stroke, a TIA does not cause permanent brain damage.  Common symptoms of a TIA include sudden weakness or numbness on one side of the body, difficulty speaking or understanding speech, sudden vision changes, and loss of balance or coordination. These symptoms are similar to those of a stroke, but they are temporary and resolve on their own.  It is crucial to treat a TIA as a medical emergency because it is a warning sign that you are at risk of having a full stroke in the near future. Without treatment, the risk of having a stroke is as high as 20% within three months. Prompt medical evaluation and treatment can significantly reduce the risk of a future stroke and improve long-term health outcomes.  ------------------------------------  You have these risks factors of strokes:   -high blood pressure (hypertension)  -diabetes mellitus  -high cholesterol (hyperlipidemia)  For secondary stroke prevention please take your medications as prescribed. If you run low on your medication, please contact your doctor before you run out.  You will follow up outpatient with the vascular neurology team, the office will call you within 3 days of discharge to schedule an appointment.  If you do not hear from the office please call the phone number provided.    Please see all regularly scheduled providers as prior to your admission. Please see your primary care doctor within one week of discharge.  In addition discuss with your primary care provider regarding candidacy for routine malignancy screenings.   Adjustments to your regular tasks may be difficult after you've had a stroke, but you can utilize  new ways/assistance as needed to manage yo     PRINCIPAL DISCHARGE DIAGNOSIS  Diagnosis: TIA (transient ischemic attack)  Assessment and Plan of Treatment: You were admitted to the hospital because you had a TIA, transient ischemic attack.   A transient ischemic attack (TIA) is often called a "mini-stroke." It happens when there is a temporary blockage of blood flow to the brain, which causes stroke-like symptoms that usually last for a few minutes to an hour and then go away completely. Unlike a full stroke, a TIA does not cause permanent brain damage.  Common symptoms of a TIA include sudden weakness or numbness on one side of the body, difficulty speaking or understanding speech, sudden vision changes, and loss of balance or coordination. These symptoms are similar to those of a stroke, but they are temporary and resolve on their own.  It is crucial to treat a TIA as a medical emergency because it is a warning sign that you are at risk of having a full stroke in the near future. Without treatment, the risk of having a stroke is as high as 20% within three months. Prompt medical evaluation and treatment can significantly reduce the risk of a future stroke and improve long-term health outcomes.  ------------------------------------  You have these risks factors of strokes:   -high blood pressure (hypertension)  -diabetes mellitus  -high cholesterol (hyperlipidemia)  For secondary stroke prevention please take your medications as prescribed. If you run low on your medication, please contact your doctor before you run out.  You will follow up outpatient with the vascular neurology team, the office will call you within 3 days of discharge to schedule an appointment.  If you do not hear from the office please call the phone number provided.    Please see all regularly scheduled providers as prior to your admission. Please see your primary care doctor within one week of discharge.  In addition discuss with your primary care provider regarding candidacy for routine malignancy screenings.   Adjustments to your regular tasks may be difficult after you've had a stroke, but you can utilize  new ways/assistance as needed to manage yo      SECONDARY DISCHARGE DIAGNOSES  Diagnosis: JENNIFER (acute kidney injury)  Assessment and Plan of Treatment:     Diagnosis: Anemia of chronic disease  Assessment and Plan of Treatment:     Diagnosis: UTI (urinary tract infection), uncomplicated  Assessment and Plan of Treatment:      PRINCIPAL DISCHARGE DIAGNOSIS  Diagnosis: TIA (transient ischemic attack)  Assessment and Plan of Treatment: You were admitted to the hospital because you had a TIA, transient ischemic attack.   A transient ischemic attack (TIA) is often called a "mini-stroke." It happens when there is a temporary blockage of blood flow to the brain, which causes stroke-like symptoms that usually last for a few minutes to an hour and then go away completely. Unlike a full stroke, a TIA does not cause permanent brain damage.  Common symptoms of a TIA include sudden weakness or numbness on one side of the body, difficulty speaking or understanding speech, sudden vision changes, and loss of balance or coordination. These symptoms are similar to those of a stroke, but they are temporary and resolve on their own.  It is crucial to treat a TIA as a medical emergency because it is a warning sign that you are at risk of having a full stroke in the near future. Without treatment, the risk of having a stroke is as high as 20% within three months. Prompt medical evaluation and treatment can significantly reduce the risk of a future stroke and improve long-term health outcomes.  ------------------------------------  You have these risks factors of strokes:   -high blood pressure (hypertension)  -diabetes mellitus  -high cholesterol (hyperlipidemia)  For secondary stroke prevention please take your medications as prescribed. If you run low on your medication, please contact your doctor before you run out.  You will follow up outpatient with the vascular neurology team, the office will call you within 3 days of discharge to schedule an appointment.  If you do not hear from the office please call the phone number provided.    Please see all regularly scheduled providers as prior to your admission. Please see your primary care doctor within one week of discharge.  In addition discuss with your primary care provider regarding candidacy for routine malignancy screenings.   Adjustments to your regular tasks may be difficult after you've had a stroke, but you can utilize  new ways/assistance as needed to manage yo      SECONDARY DISCHARGE DIAGNOSES  Diagnosis: JENNIFER (acute kidney injury)  Assessment and Plan of Treatment: imprived    Diagnosis: UTI (urinary tract infection), uncomplicated  Assessment and Plan of Treatment: treated    Diagnosis: DM (diabetes mellitus)  Assessment and Plan of Treatment: A1C 8.7  Managing diabetes and keeping your blood sugar levels under control is crucial for maintaining your health and preventing complications.   Proper diabetes management can lead to improved A1C values, modest weight loss, better quality of life, and a lower risk of complications such as heart disease and kidney problems  Regular monitoring of your blood sugar levels, taking your medications as prescribed, and making healthy lifestyle choices can significantly reduce the risk of diabetes-related complications.   Please see your primary care provider and/or endocrinologist for further recommendations regarding your diabetic regimen also monitor your finger stick glucose as recommended by your primary care and/or endocrinologist in a journal.    Diagnosis: HTN (hypertension)  Assessment and Plan of Treatment: Controlling blood pressure is crucial for maintaining overall health and preventing serious health conditions. High blood pressure, or hypertension, can lead to severe complications such as heart disease, stroke, kidney disease, and other cardiovascular problems.   Effective blood pressure control involves a combination of lifestyle changes and, when necessary, medication. Lifestyle modifications include regular physical activity, a balanced diet low in sodium, maintaining a healthy weight, limiting alcohol intake, and managing stress. These changes can significantly lower blood pressure and improve overall health.  Medication adherence is also vital. Patients should take their prescribed medications consistently and follow their healthcare provider's instructions.  Regular monitoring of blood pressure at home can help patients stay informed about their condition and make necessary adjustments to their lifestyle or treatment plan. Maintain a journal record of your blood pressure twice a day to show your primary care/cardiologist.    Diagnosis: Anemia of chronic disease  Assessment and Plan of Treatment:     Diagnosis: Stage 3 chronic kidney disease  Assessment and Plan of Treatment:

## 2025-06-02 NOTE — OCCUPATIONAL THERAPY INITIAL EVALUATION ADULT - LIVES WITH, PROFILE
Pt lives in a house with her son who can assist. 5-6 MIGUEL with handrail, full flight with bilateral handrails to bed/bath./children

## 2025-06-02 NOTE — PHYSICAL THERAPY INITIAL EVALUATION ADULT - ADDITIONAL COMMENTS
Pt AxOx4 states she lives at home with son (home with pt) with 5-6STE and 12 steps upstairs HR. Pt was independent PTA with RW outside the home and furniture surfs inside the home.

## 2025-06-02 NOTE — PHYSICAL THERAPY INITIAL EVALUATION ADULT - PHYSICAL ASSIST/NONPHYSICAL ASSIST: SIT/SUPINE, REHAB EVAL
Anesthesia Evaluation     Patient summary reviewed and Nursing notes reviewed   no history of anesthetic complications:  NPO Solid Status: > 8 hours  NPO Liquid Status: > 8 hours           Airway   Mallampati: III  TM distance: >3 FB  Neck ROM: full  No difficulty expected  Dental - normal exam     Pulmonary - negative pulmonary ROS and normal exam   Cardiovascular - normal exam  Exercise tolerance: good (4-7 METS)    ECG reviewed        Neuro/Psych  (+) numbness, psychiatric history Anxiety and Depression,     GI/Hepatic/Renal/Endo    (+)  GERD,  renal disease (interstitial cystits ),     Musculoskeletal     (+) back pain,   Abdominal   (+) obese,    Substance History   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     OB/GYN negative ob/gyn ROS         Other   arthritis,                      Anesthesia Plan    ASA 3     MAC   total IV anesthesia  intravenous induction     Anesthetic plan, all risks, benefits, and alternatives have been provided, discussed and informed consent has been obtained with: patient.  Use of blood products discussed with patient  Consented to blood products.      verbal cues/nonverbal cues (demo/gestures)/1 person assist

## 2025-06-02 NOTE — PHYSICAL THERAPY INITIAL EVALUATION ADULT - PERTINENT HX OF CURRENT PROBLEM, REHAB EVAL
83 y/o F with hx of prior stroke in 2022, hx of Right carotid stent > 5 years for asymptomatic carotid disease, PE in 2022 s/p thrombectomy post-covid vaccine, on Eliquis 2.5mg BID and ASA who presents for transient episode of slurred speech and R arm weakness.  CT head neg. 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  +TIA

## 2025-06-02 NOTE — OCCUPATIONAL THERAPY INITIAL EVALUATION ADULT - DIAGNOSIS, OT EVAL
84 year old Female presents for transient episode of slurred speech and R arm weakness. CT head (-). 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  +TIA

## 2025-06-02 NOTE — CONSULT NOTE ADULT - ASSESSMENT
85 y/o F with hx of prior stroke in 2022, hx of Right carotid stent > 5 years for asymptomatic carotid disease, PE in 2022 s/p thrombectomy post-covid vaccine, on Eliquis 2.5mg BID and ASA, DM, CKD, Anemia, HLD who presents for transient episode of slurred speech and R arm weakness, at 7:30AM and noted that she had difficulty raising her right arm and pulling herself up.  No other focal weakness. Her children also noted she was slurring her words. Upon arrival here, no more symptoms, denies any prior episode.  Admits to noncompliance with Eliquis and misses nightly dose regularly.  Follows with hematology/pulm/nephro.  Has known anemia and CKD, she had Imaging done in the ED showed CT HEAD: No acute intracranial findings, CT PERFUSION: No focal perfusion deficit, CT ANGIOGRAPHY NECK: Right carotid stent appears patent. Short-segment moderate narrowing proximal left cervical ICA.  CT ANGIOGRAPHY HEAD: Moderate narrowing left cavernous segment ICA. Severe narrowing right distal petrous/proximal cavernous segment ICA.   Mild to moderate narrowing right paraclinoid segment ICA. Focal moderate narrowing right proximal P2 segment. Focal near-complete occlusion right   distal P2 segment, patient is admitted under stroke team, Medicine consulted for Medical Management    Plan:      TIA in setting of previous Hx of stroke:     -Continue close monitoring for neurologic deterioration    -Stroke neuro checks q 4 hours    Continues Eliquis 2.5mg BID and ASA 81mg daily   - started on atorvastatin 80 mg daily  -MRI Brain w/o  -Possible Neuro IR consultation pending MRI results  -Dysphagia screen: passed   -Physical therapy/OT/Speech eval/treatment.     JENNIFER on CKD: creatinine is going up, will hold HCTZ, will do bladder scan, will avoid Nephrotoxic agents, will monitor I/O and daily weight, will give gentle IV hydration, if worsening will get US kidneys and Nephrology consult      Hx of HTN: Has been started on Hydralizine 25mg BID, will increase the dose, holding hydrochlorothiazide 25 mg once a day with holding parameters, will give PRN meds if BP is high     Hx of DM: FS monitoring and coverage insulin, will hold Glipizde and Metfromin while here in the hospital, Hb a1c is 8.7    Hx of HLD: will continue with atorvastatin 80 mg once a day (at bedtime)    Hx of GERD: Will continue with Protonix 40 mg  once a day    Anemia seems like chronic with some drop:  patient hb in december was ~9, now ~8  will type and screen and will transfuser if drops further down  will send anemia workup   will continue with Iron and vitamin C  might need Erythropoietin given hx of CKD.      85 y/o F with hx of prior stroke in 2022, hx of Right carotid stent > 5 years for asymptomatic carotid disease, PE in 2022 s/p thrombectomy post-covid vaccine, on Eliquis 2.5mg BID and ASA, DM, CKD, Anemia, HLD who presents for transient episode of slurred speech and R arm weakness, at 7:30AM and noted that she had difficulty raising her right arm and pulling herself up.  No other focal weakness. Her children also noted she was slurring her words. Upon arrival here, no more symptoms, denies any prior episode.  Admits to noncompliance with Eliquis and misses nightly dose regularly.  Follows with hematology/pulm/nephro.  Has known anemia and CKD, she had Imaging done in the ED showed CT HEAD: No acute intracranial findings, CT PERFUSION: No focal perfusion deficit, CT ANGIOGRAPHY NECK: Right carotid stent appears patent. Short-segment moderate narrowing proximal left cervical ICA.  CT ANGIOGRAPHY HEAD: Moderate narrowing left cavernous segment ICA. Severe narrowing right distal petrous/proximal cavernous segment ICA. Mild to moderate narrowing right paraclinoid segment ICA. Focal moderate narrowing right proximal P2 segment. Focal near-complete occlusion right distal P2 segment, patient is admitted under stroke team, Medicine consulted for Medical Management    Plan:      TIA in setting of previous Hx of stroke:     -Continue close monitoring for neurologic deterioration    -Stroke neuro checks q 4 hours    Continues Eliquis 2.5mg BID and ASA 81mg daily   - started on atorvastatin 80 mg daily  -MRI Brain w/o  -Possible Neuro IR consultation pending MRI results  -Dysphagia screen: passed   -Physical therapy/OT/Speech eval/treatment.     JENNIFER on CKD: creatinine is going up, will hold HCTZ, will do bladder scan, will avoid Nephrotoxic agents, will monitor I/O and daily weight, will give gentle IV hydration, if worsening will get US kidneys and Nephrology consult    Hx of HTN: would resume her atenolol 100mg daily, has been started on Hydralazine 25mg BID, will increase the dose, holding hydrochlorothiazide 25 mg once a day with holding parameters, will give PRN meds if BP is high     Hx of DM: FS monitoring and coverage insulin, will hold Glipizide and Metformin while here in the hospital, Hb a1c is 8.7    Hx of HLD: will continue with atorvastatin 80 mg once a day (at bedtime)    Hx of GERD: Will continue with Protonix 40 mg  once a day    Anemia seems like chronic with some drop:  patient hb in december was ~9, now ~8  will type and screen and will transfuser if drops further down  will send anemia workup   will continue with Iron and vitamin C  might need Erythropoietin given hx of CKD.     Plan of care discussed with  Stroke team     Medicine will follow along    50 minutes spent on total encounter. The necessity of the time spent during the encounter on this date of service was due to:     Time spent reviewing the chart documentation, reviewing labs and imaging studies, evaluating the patient, discussing the plan of care with the consultants & medical team, and documenting.

## 2025-06-03 LAB
ABO RH CONFIRMATION: SIGNIFICANT CHANGE UP
ALBUMIN SERPL ELPH-MCNC: 2.9 G/DL — LOW (ref 3.3–5.2)
ALP SERPL-CCNC: 68 U/L — SIGNIFICANT CHANGE UP (ref 40–120)
ALT FLD-CCNC: 6 U/L — SIGNIFICANT CHANGE UP
ANION GAP SERPL CALC-SCNC: 14 MMOL/L — SIGNIFICANT CHANGE UP (ref 5–17)
AST SERPL-CCNC: 13 U/L — SIGNIFICANT CHANGE UP
BILIRUB SERPL-MCNC: <0.2 MG/DL — LOW (ref 0.4–2)
BLD GP AB SCN SERPL QL: SIGNIFICANT CHANGE UP
BUN SERPL-MCNC: 32.3 MG/DL — HIGH (ref 8–20)
CALCIUM SERPL-MCNC: 8.2 MG/DL — LOW (ref 8.4–10.5)
CHLORIDE SERPL-SCNC: 107 MMOL/L — SIGNIFICANT CHANGE UP (ref 96–108)
CO2 SERPL-SCNC: 21 MMOL/L — LOW (ref 22–29)
CREAT SERPL-MCNC: 2.38 MG/DL — HIGH (ref 0.5–1.3)
EGFR: 19 ML/MIN/1.73M2 — LOW
EGFR: 19 ML/MIN/1.73M2 — LOW
FERRITIN SERPL-MCNC: 33 NG/ML — SIGNIFICANT CHANGE UP (ref 13–330)
FOLATE SERPL-MCNC: 6.9 NG/ML — SIGNIFICANT CHANGE UP
GLUCOSE BLDC GLUCOMTR-MCNC: 174 MG/DL — HIGH (ref 70–99)
GLUCOSE BLDC GLUCOMTR-MCNC: 220 MG/DL — HIGH (ref 70–99)
GLUCOSE BLDC GLUCOMTR-MCNC: 225 MG/DL — HIGH (ref 70–99)
GLUCOSE BLDC GLUCOMTR-MCNC: 286 MG/DL — HIGH (ref 70–99)
GLUCOSE SERPL-MCNC: 145 MG/DL — HIGH (ref 70–99)
HCT VFR BLD CALC: 24.8 % — LOW (ref 34.5–45)
HCT VFR BLD CALC: 29 % — LOW (ref 34.5–45)
HGB BLD-MCNC: 7.5 G/DL — LOW (ref 11.5–15.5)
HGB BLD-MCNC: 8.6 G/DL — LOW (ref 11.5–15.5)
IMMATURE RETICULOCYTE FRACTION %: 31.6 % — SIGNIFICANT CHANGE UP
IRON SATN MFR SERPL: 16 % — SIGNIFICANT CHANGE UP (ref 14–50)
IRON SATN MFR SERPL: 45 UG/DL — SIGNIFICANT CHANGE UP (ref 37–145)
MAGNESIUM SERPL-MCNC: 2.1 MG/DL — SIGNIFICANT CHANGE UP (ref 1.6–2.6)
MCHC RBC-ENTMCNC: 23.3 PG — LOW (ref 27–34)
MCHC RBC-ENTMCNC: 23.7 PG — LOW (ref 27–34)
MCHC RBC-ENTMCNC: 29.7 G/DL — LOW (ref 32–36)
MCHC RBC-ENTMCNC: 30.2 G/DL — LOW (ref 32–36)
MCV RBC AUTO: 78.2 FL — LOW (ref 80–100)
MCV RBC AUTO: 78.6 FL — LOW (ref 80–100)
NRBC # BLD AUTO: 0 K/UL — SIGNIFICANT CHANGE UP (ref 0–0)
NRBC # BLD AUTO: 0 K/UL — SIGNIFICANT CHANGE UP (ref 0–0)
NRBC # FLD: 0 K/UL — SIGNIFICANT CHANGE UP (ref 0–0)
NRBC # FLD: 0 K/UL — SIGNIFICANT CHANGE UP (ref 0–0)
NRBC BLD AUTO-RTO: 0 /100 WBCS — SIGNIFICANT CHANGE UP (ref 0–0)
NRBC BLD AUTO-RTO: 0 /100 WBCS — SIGNIFICANT CHANGE UP (ref 0–0)
PHOSPHATE SERPL-MCNC: 4.4 MG/DL — SIGNIFICANT CHANGE UP (ref 2.4–4.7)
PLATELET # BLD AUTO: 229 K/UL — SIGNIFICANT CHANGE UP (ref 150–400)
PLATELET # BLD AUTO: 258 K/UL — SIGNIFICANT CHANGE UP (ref 150–400)
PMV BLD: 10.1 FL — SIGNIFICANT CHANGE UP (ref 7–13)
PMV BLD: 10.4 FL — SIGNIFICANT CHANGE UP (ref 7–13)
POTASSIUM SERPL-MCNC: 4.7 MMOL/L — SIGNIFICANT CHANGE UP (ref 3.5–5.3)
POTASSIUM SERPL-SCNC: 4.7 MMOL/L — SIGNIFICANT CHANGE UP (ref 3.5–5.3)
PROT SERPL-MCNC: 5.9 G/DL — LOW (ref 6.6–8.7)
RBC # BLD: 3.17 M/UL — LOW (ref 3.8–5.2)
RBC # BLD: 3.17 M/UL — LOW (ref 3.8–5.2)
RBC # BLD: 3.69 M/UL — LOW (ref 3.8–5.2)
RBC # FLD: 17.3 % — HIGH (ref 10.3–14.5)
RBC # FLD: 17.4 % — HIGH (ref 10.3–14.5)
RETICS #: 50.4 K/UL — SIGNIFICANT CHANGE UP (ref 25–125)
RETICS/RBC NFR: 1.6 % — SIGNIFICANT CHANGE UP (ref 0.5–2.5)
RETICULOCYTE HEMOGLOBIN EQUIVALENT: 27 PG — LOW (ref 30.6–40.7)
SODIUM SERPL-SCNC: 141 MMOL/L — SIGNIFICANT CHANGE UP (ref 135–145)
TIBC SERPL-MCNC: 279 UG/DL — SIGNIFICANT CHANGE UP (ref 220–430)
TRANSFERRIN SERPL-MCNC: 195 MG/DL — SIGNIFICANT CHANGE UP (ref 192–382)
VIT B12 SERPL-MCNC: >2000 PG/ML — HIGH (ref 232–1245)
WBC # BLD: 6.7 K/UL — SIGNIFICANT CHANGE UP (ref 3.8–10.5)
WBC # BLD: 7.49 K/UL — SIGNIFICANT CHANGE UP (ref 3.8–10.5)
WBC # FLD AUTO: 6.7 K/UL — SIGNIFICANT CHANGE UP (ref 3.8–10.5)
WBC # FLD AUTO: 7.49 K/UL — SIGNIFICANT CHANGE UP (ref 3.8–10.5)

## 2025-06-03 PROCEDURE — 76775 US EXAM ABDO BACK WALL LIM: CPT | Mod: 26

## 2025-06-03 PROCEDURE — 70551 MRI BRAIN STEM W/O DYE: CPT | Mod: 26

## 2025-06-03 PROCEDURE — 99233 SBSQ HOSP IP/OBS HIGH 50: CPT

## 2025-06-03 RX ORDER — INSULIN LISPRO 100 U/ML
3 INJECTION, SOLUTION INTRAVENOUS; SUBCUTANEOUS
Refills: 0 | Status: DISCONTINUED | OUTPATIENT
Start: 2025-06-03 | End: 2025-06-10

## 2025-06-03 RX ORDER — INSULIN GLARGINE-YFGN 100 [IU]/ML
15 INJECTION, SOLUTION SUBCUTANEOUS AT BEDTIME
Refills: 0 | Status: DISCONTINUED | OUTPATIENT
Start: 2025-06-03 | End: 2025-06-10

## 2025-06-03 RX ADMIN — Medication 325 MILLIGRAM(S): at 21:25

## 2025-06-03 RX ADMIN — Medication 500 MILLIGRAM(S): at 11:59

## 2025-06-03 RX ADMIN — INSULIN LISPRO 2: 100 INJECTION, SOLUTION INTRAVENOUS; SUBCUTANEOUS at 08:25

## 2025-06-03 RX ADMIN — Medication 25 MILLIGRAM(S): at 12:52

## 2025-06-03 RX ADMIN — INSULIN LISPRO 6: 100 INJECTION, SOLUTION INTRAVENOUS; SUBCUTANEOUS at 17:12

## 2025-06-03 RX ADMIN — Medication 325 MILLIGRAM(S): at 12:52

## 2025-06-03 RX ADMIN — Medication 25 MILLIGRAM(S): at 21:26

## 2025-06-03 RX ADMIN — INSULIN GLARGINE-YFGN 15 UNIT(S): 100 INJECTION, SOLUTION SUBCUTANEOUS at 21:24

## 2025-06-03 RX ADMIN — INSULIN LISPRO 4: 100 INJECTION, SOLUTION INTRAVENOUS; SUBCUTANEOUS at 21:25

## 2025-06-03 RX ADMIN — INSULIN LISPRO 3 UNIT(S): 100 INJECTION, SOLUTION INTRAVENOUS; SUBCUTANEOUS at 17:13

## 2025-06-03 RX ADMIN — Medication 81 MILLIGRAM(S): at 11:59

## 2025-06-03 RX ADMIN — APIXABAN 2.5 MILLIGRAM(S): 2.5 TABLET, FILM COATED ORAL at 05:04

## 2025-06-03 RX ADMIN — Medication 325 MILLIGRAM(S): at 05:03

## 2025-06-03 RX ADMIN — APIXABAN 2.5 MILLIGRAM(S): 2.5 TABLET, FILM COATED ORAL at 17:13

## 2025-06-03 RX ADMIN — ATORVASTATIN CALCIUM 80 MILLIGRAM(S): 80 TABLET, FILM COATED ORAL at 21:25

## 2025-06-03 RX ADMIN — INSULIN LISPRO 4: 100 INJECTION, SOLUTION INTRAVENOUS; SUBCUTANEOUS at 11:58

## 2025-06-03 NOTE — PROGRESS NOTE ADULT - ASSESSMENT
INCOMPLETE_ VISIT PENDING     83 y/o F with hx of prior stroke in 2022, hx of Right carotid stent > 5 years for asymptomatic carotid disease, PE in 2022 s/p thrombectomy post-covid vaccine, on Eliquis 2.5mg BID and ASA who presents for transient episode of slurred speech and R arm weakness. CT head without acute infarction or hemorrhage. CT ngiogram head/neck shows patent Rt ICA stent, moderate cervical left ICA stenosis, moderate left cavernous ICA stenosis, severe distal right cavernous segment ICA stenosis, mild-moderate right paraclinoid ICA stenosis, focal right proximal P2 segment stenosis with focal to near complete occlusion of the right distal P2 segment.        Impression: Transient right hemiparesis and dysarthria, concern for left hemispheric dysfunction. Possible TIA but will screen for acute infarction. Multifocal extra and intracranial atherosclerotic disease.     NEURO: TIA  -Neurologically    -Continue close monitoring for neurologic deterioration    -Stroke neuro checks q 4 hours    -SBP currently 150-190s mmHg, gradual titration 150-170mmHg for now , over the next 1-2 weeks goal of 140-160mmHg with eventual long term age/risk specific relative normotension as tolerated avoiding rapid fluctuations.   -ANTITHROMBOTIC THERAPY: Continues Apixaban 2.5mg BID and ASA 81mg daily  - - started on atorvastatin 80 mg daily  -MRI Brain w/o  -Possible Neuro IR consultation pending MRI results  -Dysphagia screen: passed   -Physical therapy/OT/Speech eval/treatment.     CARDIOVASCULAR: HTN, HLD  -TTE: as above  -Continuous cardiac monitoring for arrhythmias   -Blood pressure and antiplatelet/anticoagulation plan as above               Heme: Anemia of chronic disease  - follow Hb  - outpatient f/u with hematology or sooner if clinically indicated  -No active bleeding noted     ENDO: DM with hyperglycemia  - HbA1c: 8.7  - continue regular fingersticks   - continue insulin sliding scale   - monitor glucose levels   - f/u outpatient with endocrinologist (pt reports improved a1c from 11)    PULMONARY:   - protecting airway, saturating well     RENAL: Acute on CKD  - Gentle hydration  - monitor urine output, maintain adequate hydration    - Na Goal:  135-145  - monitor metabolic panels   - Brooke: n/a    ID:   - Afebrile, monitor for si/sx of infection   - monitor temperature curve   - UA , positive LE, TNTC bacteria, cloudy started on Rocephin  -Follow up UCx and sensitivities     GI/Nutrition:   - continue bowel regimen   -GI ppx as needed     Skin/Musk:  - recurrent evaluations for evidence of skin breakdown     OTHER:  condition and plan of care d/w patient, questions and concerns addressed.     DISPOSITION: TABATHA per PT eval once stable and workup is complete    CORE MEASURES:        Admission NIHSS: 0     Tenecteplase : [] YES [x] NO      LDL/HDL/A1C: 131/38/8.7     Depression Screen- if depression hx and/or present      Statin Therapy: Y     Dysphagia Screen: [x] PASS [] FAIL     Smoking [] YES [x] NO      Afib [] YES [x] NO     Stroke Education [] YES [] NO pending    Obtain screening lower extremity venous ultrasound in patients who meet 1 or more of the following criteria as patient is high risk for DVT/PE on admission:   [] History of DVT/PE  []Hypercoagulable states (Factor V Leiden, Cancer, OCP, etc. )  []Prolonged immobility (hemiplegia/hemiparesis/post operative or any other extended immobilization)  [] Transferred from outside facility (Rehab or Long term care)  [] Age </= to 50   83 y/o F with hx of prior stroke in 2022, hx of Right carotid stent > 5 years for asymptomatic carotid disease, PE in 2022 s/p thrombectomy post-covid vaccine, on Eliquis 2.5mg BID and ASA who presents for transient episode of slurred speech and R arm weakness. CT head without acute infarction or hemorrhage. CT ngiogram head/neck shows patent Rt ICA stent, moderate cervical left ICA stenosis, moderate left cavernous ICA stenosis, severe distal right cavernous segment ICA stenosis, mild-moderate right paraclinoid ICA stenosis, focal right proximal P2 segment stenosis with focal to near complete occlusion of the right distal P2 segment.        Impression: Transient right hemiparesis and dysarthria, concern for left hemispheric dysfunction. Possible TIA but will screen for acute infarction. Multifocal extra and intracranial atherosclerotic disease.     NEURO: TIA  -Neurologically without acute change.   -Continue close monitoring for neurologic deterioration    -Stroke neuro checks q 4 hours    -SBP currently 150-190s mmHg, gradual titration 150-170mmHg for now , over the next 1 week goal of 140-160mmHg with eventual long term age/risk specific relative normotension as tolerated avoiding rapid fluctuations.   -ANTITHROMBOTIC THERAPY: Continues Apixaban 2.5mg BID and ASA 81mg daily  - - started on atorvastatin 80 mg daily  -MRI Brain w/o pending   -Possible Neuro IR consultation pending MRI results  -Dysphagia screen: passed   -Physical therapy/OT/Speech eval/treatment.     CARDIOVASCULAR: HTN, HLD  -TTE: as above  -Continuous cardiac monitoring for arrhythmias   -Blood pressure and antiplatelet/anticoagulation plan as above               Heme: Anemia of chronic disease, plt 229  - follow Hb, acute on chronic anemia   -Transfuse accordingly   - outpatient f/u with hematology or sooner if clinically indicated  -No active bleeding noted   -No evidence of DVT in either extremity on LE venous duplex     ENDO: DM with hyperglycemia  - HbA1c: 8.7  - continue regular fingersticks   - continue insulin sliding scale   - monitor glucose levels   - f/u outpatient with endocrinologist (pt reports improved a1c from 11)    PULMONARY:   - protecting airway, saturating well   -Encourage mobility and incentive spirometry as tolerated     RENAL: Acute on CKD  - Gentle hydration  - Renal US   - monitor urine output, maintain adequate hydration    - Na Goal:  135-145  - monitor metabolic panels , supplement accordingly.   - Brooke: n/a    ID:   - Afebrile, monitor for si/sx of infection   - monitor temperature curve   - UA , positive LE, TNTC bacteria, cloudy started on Rocephin  -Follow up UCx and sensitivities     GI/Nutrition:   - continue bowel regimen   -GI ppx as needed     Skin/Musk:  - recurrent evaluations for evidence of skin breakdown     OTHER:  condition and plan of care d/w patient, questions and concerns addressed.     DISPOSITION: TABATHA per PT eval once stable and workup is complete    CORE MEASURES:        Admission NIHSS: 0     Tenecteplase : [] YES [x] NO      LDL/HDL/A1C: 131/38/8.7     Depression Screen- if depression hx and/or present      Statin Therapy: Y     Dysphagia Screen: [x] PASS [] FAIL     Smoking [] YES [x] NO      Afib [] YES [x] NO     Stroke Education [x] YES [] NO      Obtain screening lower extremity venous ultrasound in patients who meet 1 or more of the following criteria as patient is high risk for DVT/PE on admission:   [] History of DVT/PE  []Hypercoagulable states (Factor V Leiden, Cancer, OCP, etc. )  []Prolonged immobility (hemiplegia/hemiparesis/post operative or any other extended immobilization)  [] Transferred from outside facility (Rehab or Long term care)  [] Age </= to 50  [x] stroke

## 2025-06-03 NOTE — DISCHARGE NOTE NURSING/CASE MANAGEMENT/SOCIAL WORK - NSDCPEFALRISK_GEN_ALL_CORE
For information on Fall & Injury Prevention, visit: [email or web address] OR  [email or web address] OR  https://www.cdc.gov/steadi/patient.html

## 2025-06-03 NOTE — PROVIDER CONTACT NOTE (OTHER) - SITUATION
midline order was placed after no iv access could be obtain. midline has still not been placed and patient has not received blood that was ordered.

## 2025-06-03 NOTE — PROGRESS NOTE ADULT - ASSESSMENT
83 y/o F with hx of prior stroke in 2022, hx of Right carotid stent > 5 years for asymptomatic carotid disease, PE in 2022 s/p thrombectomy post-covid vaccine, on Eliquis 2.5mg BID and ASA, DM, CKD, Anemia, HLD who presents for transient episode of slurred speech and R arm weakness, at 7:30AM and noted that she had difficulty raising her right arm and pulling herself up.  No other focal weakness. Her children also noted she was slurring her words. Upon arrival here, no more symptoms, denies any prior episode.  Admits to noncompliance with Eliquis and misses nightly dose regularly.  Follows with hematology/pulm/nephro.  Has known anemia and CKD, she had Imaging done in the ED showed CT HEAD: No acute intracranial findings, CT PERFUSION: No focal perfusion deficit, CT ANGIOGRAPHY NECK: Right carotid stent appears patent. Short-segment moderate narrowing proximal left cervical ICA.  CT ANGIOGRAPHY HEAD: Moderate narrowing left cavernous segment ICA. Severe narrowing right distal petrous/proximal cavernous segment ICA. Mild to moderate narrowing right paraclinoid segment ICA. Focal moderate narrowing right proximal P2 segment. Focal near-complete occlusion right distal P2 segment, patient is admitted under stroke team, Medicine consulted for Medical Management    Plan:      TIA in setting of previous Hx of stroke:     -Continue close monitoring for neurologic deterioration    -Stroke neuro checks q 4 hours    Continues Eliquis 2.5mg BID and ASA 81mg daily   - started on atorvastatin 80 mg daily  -MRI Brain w/o to be done   -Possible Neuro IR consultation pending MRI results  -Dysphagia screen: passed   -Physical therapy/OT/Speech eval/treatment.     JENNIFER on CKD: creatinine is going up, today is 2.3, will continue to hold HCTZ, will avoid Nephrotoxic agents, will monitor I/O and daily weight, will give gentle IV hydration, if worsening will get US kidneys and Nephrology consult. renal US pending     Hx of HTN: would resume her atenolol 100mg daily, will add Hydralazine 25mg Q8h, holding hydrochlorothiazide 25 mg once a day with holding parameters, will give PRN meds if BP is high     Hx of DM: FS monitoring and coverage insulin, will hold Glipizide and Metformin while here in the hospital, Hb a1c is 8.7, has been started on Lantus 12 units at night, will increase to 15 units tonight and will add Prandial insulin 3 units with meals.      Hx of HLD: will continue with atorvastatin 80 mg once a day (at bedtime)    Hx of GERD: Will continue with Protonix 40 mg  once a day    Anemia seems like chronic with some drop:  patient hb in december was ~9, now 7.5   type and screen done, will transfuse a unit of PRBCs given she has tiredness working with PT as well as hx of TIA and stroke with low Hb    anemia workup done with normal Iron and TIBC  will continue with Iron and vitamin C  might need Erythropoietin given hx of CKD.     Plan of care discussed with  Stroke team     Medicine will follow along    40 minutes spent on total encounter. The necessity of the time spent during the encounter on this date of service was due to:     Time spent reviewing the chart documentation, reviewing labs and imaging studies, evaluating the patient, discussing the plan of care with the consultants & medical team, and documenting.

## 2025-06-03 NOTE — DISCHARGE NOTE NURSING/CASE MANAGEMENT/SOCIAL WORK - FINANCIAL ASSISTANCE
[facility] provides services at a reduced cost to those who are determined to be eligible through [facility]’s financial assistance program. Information regarding [facility]’s financial assistance program can be found by going to [email or web address] or by calling [phone].

## 2025-06-03 NOTE — DISCHARGE NOTE NURSING/CASE MANAGEMENT/SOCIAL WORK - PATIENT PORTAL LINK FT
You can access the FollowMyHealth Patient Portal offered by [facility] by registering at the following website: [email or web address]. By joining [employer]’s FollowMyHealth portal, you will also be able to view your health information using other applications (apps) compatible with our system.

## 2025-06-03 NOTE — PROGRESS NOTE ADULT - SUBJECTIVE AND OBJECTIVE BOX
Preliminary note, official recommendations pending attending review/signature   Seen and examined by Stroke team attending/team, assessment/ plan as discussed with stroke team attending/team as noted.     [facility] Stroke Team  Progress Note     HPI:    Ms. Stephens is a 83 y/o woman with hx of prior stroke in 2022, hx of Right carotid stent > 5 years for asymptomatic carotid disease, PE in 2022 s/p thrombectomy post-covid vaccine, on Apixaban 2.5mg BID and ASA, DM, CKD, Anemia, HLD who presented for transient episode of slurred speech and Rt arm weakness. Last well known last evening prior to presentation, woke up  AM of admission at 7:30AM and noted that she had difficulty raising her right arm and pulling herself up.  No other focal weakness. Her children also noted she was slurring her words. Upon arrival here, no more symptomswere noted.      Denied any prior episode.  Admitted to noncompliance with Apixaban and misses nightly dose regularly.  Follows with hematology/pulm/nephro.  Has known anemia and CKD.     SUBJECTIVE: No events overnight.  No new neurologic complaints.  ROS reported negative unless otherwise noted.    acetaminophen     Tablet .. 650 milliGRAM(s) Oral every 6 hours PRN  apixaban 2.5 milliGRAM(s) Oral every 12 hours  ascorbic acid 500 milliGRAM(s) Oral daily  aspirin enteric coated 81 milliGRAM(s) Oral daily  atenolol  Tablet 100 milliGRAM(s) Oral daily  atorvastatin 80 milliGRAM(s) Oral at bedtime  cefTRIAXone Injectable. 1000 milliGRAM(s) IV Push every 24 hours  dextrose 5%. 1000 milliLiter(s) IV Continuous <Continuous>  dextrose 5%. 1000 milliLiter(s) IV Continuous <Continuous>  dextrose 50% Injectable 25 Gram(s) IV Push once  dextrose 50% Injectable 12.5 Gram(s) IV Push once  dextrose 50% Injectable 25 Gram(s) IV Push once  dextrose Oral Gel 15 Gram(s) Oral once PRN  ferrous    sulfate 325 milliGRAM(s) Oral every 8 hours  glucagon  Injectable 1 milliGRAM(s) IntraMuscular once  hydrALAZINE Injectable 10 milliGRAM(s) IV Push every 4 hours PRN  insulin glargine Injectable (LANTUS) 12 Unit(s) SubCutaneous at bedtime  insulin lispro (ADMELOG) corrective regimen sliding scale   SubCutaneous Before meals and at bedtime  labetalol Injectable 10 milliGRAM(s) IV Push every 4 hours PRN  polyethylene glycol 3350 17 Gram(s) Oral at bedtime PRN  senna 2 Tablet(s) Oral at bedtime  sodium chloride 0.9%. 1000 milliLiter(s) IV Continuous <Continuous>      PHYSICAL EXAM:   Vital Signs Last 24 Hrs  T(C): 37 (03 Jun 2025 07:24), Max: 37.3 (03 Jun 2025 00:13)  T(F): 98.6 (03 Jun 2025 07:24), Max: 99.1 (03 Jun 2025 00:13)  HR: 78 (03 Jun 2025 07:24) (78 - 91)  BP: 159/73 (03 Jun 2025 07:24) (137/57 - 199/84)  BP(mean): 82 (02 Jun 2025 10:00) (82 - 82)  RR: 18 (03 Jun 2025 07:24) (17 - 19)  SpO2: 96% (03 Jun 2025 07:24) (96% - 100%)    Parameters below as of 03 Jun 2025 07:24  Patient On (Oxygen Delivery Method): room air      EXAM PENDING     Physical Exam:  General: No acute distress.   HEENT: Head normocephalic, atraumatic. Conjunctivae clear w/o exudates or hemorrhage. Sclera non-icteric. Nares are patent bilaterally. Mucous membranes pink and moist.   Cardiac: Normal rate and rhythm.    Respiratory: Lung sounds clear in all fields. Chest wall symmetric, nontender.   Abdominal: Soft, nondistended, nontender. Bowel sounds normoactive x 4 quadrants.    Skin: Skin is warm, dry   Extremities: No edema     Detailed Neurologic Exam:    Mental status: The patient is awake and alert and has normal attention span.  The patient is fully oriented in 3 spheres. The patient is able to name objects, follow commands, repeat sentences.  Cranial nerves: Pupils equal and react symmetrically to light. There is no visual field deficit to confrontation. Extraocular motion is full with no nystagmus. There is no ptosis. Facial sensation is intact. Facial musculature is symmetric. Palate elevates symmetrically. Tongue is midline.  Motor: There is normal bulk and tone.  There is no tremor.  Strength is 5/5 in the right arm and leg.   Strength is 5/5 in the left arm and leg.  Sensation: Intact to light touch in 4 extremities  Cerebellar: There is no dysmetria on finger to nose testing.  Gait : deferred    LABS:                        7.5    6.70  )-----------( 229      ( 03 Jun 2025 04:39 )             24.8    06-03    141  |  107  |  32.3[H]  ----------------------------<  145[H]  4.7   |  21.0[L]  |  2.38[H]    Ca    8.2[L]      03 Jun 2025 04:39  Phos  4.4     06-03  Mg     2.1     06-03    TPro  5.9[L]  /  Alb  2.9[L]  /  TBili  <0.2[L]  /  DBili  x   /  AST  13  /  ALT  6   /  AlkPhos  68  06-03  PT/INR - ( 01 Jun 2025 09:57 )   PT: 13.7 sec;   INR: 1.21 ratio         PTT - ( 01 Jun 2025 09:57 )  PTT:27.7 sec      06-02 Chol 200[H] LDL - 131 - HDL 38[L] Trig 171[H]    A1C: 8.7 % (06.02.25 @ 06:42)     IMAGING: Reviewed by me.    Neuro-Imaging   (06.01.25 @ 09:08)  IMPRESSION:  CT HEAD: No acute intracranial findings.  CT PERFUSION: No focal perfusion deficit.  CT ANGIOGRAPHY NECK: Right carotid stent appears patent. Short-segment   moderatenarrowing proximal left cervical ICA.  CT ANGIOGRAPHY HEAD: Moderate narrowing left cavernous segment ICA.   Severe narrowing right distal petrous/proximal cavernous segment ICA.   Mild to moderate narrowing right paraclinoid segment ICA. Focal moderate   narrowing right proximal P2 segment. Focal near-complete occlusion right   distal P2 segment.  --  ULTRASOUND   US Duplex Venous Lower Ext Complete, Bilateral (06.01.25 @ 15:39)  IMPRESSION:  No evidence of deep venous thrombosis in either lower extremity.  --  CARDIOLOGY   TTE W or WO Ultrasound Enhancing Agent (06.01.25 @ 13:50)  CONCLUSIONS:      1. Left ventricular systolic function is normal with an ejection fraction visually estimated at 65 %. There are no regional wall motion abnormalities seen.   2. Mild left ventricular hypertrophy.   3. There is mild (grade 1) left ventricular diastolic dysfunction, with normal left ventricular filling pressure.   4. Normal right ventricular cavity size.   5. Left atrium is normal in size.   6. There is mild posterior calcification of the mitral valve annulus.   7. Estimatedpulmonary artery systolic pressure is 22 mmHg.   8. Fibrocalcific aortic valve sclerosis without stenosis.   9. The interatrial septum appears intact.  10. No pericardial effusion seen.  --  XRAY  Xray Chest 1 View AP/PA. (06.01.25 @ 09:35)   HEART:difficult to access in this projection.  LUNGS: retrocardiac infiltrate..  BONES: degenerative changes                Preliminary note, official recommendations pending attending review/signature   Seen and examined by Stroke team attending , noted assessment/ plan as discussed with stroke team attending/team.     [facility] Stroke Team  Progress Note     HPI:    Ms. Stephens is a 83 y/o woman with hx of prior stroke in 2022, hx of Right carotid stent > 5 years for asymptomatic carotid disease, PE in 2022 s/p thrombectomy post-covid vaccine, on Apixaban 2.5mg BID and ASA, DM, CKD, Anemia, HLD who presented for transient episode of slurred speech and Rt arm weakness. Last well known last evening prior to presentation, woke up  AM of admission at 7:30AM and noted that she had difficulty raising her right arm and pulling herself up.  No other focal weakness. Her children also noted she was slurring her words. Upon arrival here, no more symptoms were noted.      Denied any prior episode.  Admitted to noncompliance with Apixaban and misses nightly dose regularly.  Follows with hematology/pulm/nephro.  Has known anemia and CKD.     SUBJECTIVE: No events overnight.  No new neurologic complaints.  ROS reported negative unless otherwise noted.    acetaminophen     Tablet .. 650 milliGRAM(s) Oral every 6 hours PRN  apixaban 2.5 milliGRAM(s) Oral every 12 hours  ascorbic acid 500 milliGRAM(s) Oral daily  aspirin enteric coated 81 milliGRAM(s) Oral daily  atenolol  Tablet 100 milliGRAM(s) Oral daily  atorvastatin 80 milliGRAM(s) Oral at bedtime  cefTRIAXone Injectable. 1000 milliGRAM(s) IV Push every 24 hours  dextrose 5%. 1000 milliLiter(s) IV Continuous <Continuous>  dextrose 5%. 1000 milliLiter(s) IV Continuous <Continuous>  dextrose 50% Injectable 25 Gram(s) IV Push once  dextrose 50% Injectable 12.5 Gram(s) IV Push once  dextrose 50% Injectable 25 Gram(s) IV Push once  dextrose Oral Gel 15 Gram(s) Oral once PRN  ferrous    sulfate 325 milliGRAM(s) Oral every 8 hours  glucagon  Injectable 1 milliGRAM(s) IntraMuscular once  hydrALAZINE Injectable 10 milliGRAM(s) IV Push every 4 hours PRN  insulin glargine Injectable (LANTUS) 12 Unit(s) SubCutaneous at bedtime  insulin lispro (ADMELOG) corrective regimen sliding scale   SubCutaneous Before meals and at bedtime  labetalol Injectable 10 milliGRAM(s) IV Push every 4 hours PRN  polyethylene glycol 3350 17 Gram(s) Oral at bedtime PRN  senna 2 Tablet(s) Oral at bedtime  sodium chloride 0.9%. 1000 milliLiter(s) IV Continuous <Continuous>      PHYSICAL EXAM:   Vital Signs Last 24 Hrs  T(C): 37 (03 Jun 2025 07:24), Max: 37.3 (03 Jun 2025 00:13)  T(F): 98.6 (03 Jun 2025 07:24), Max: 99.1 (03 Jun 2025 00:13)  HR: 78 (03 Jun 2025 07:24) (78 - 91)  BP: 159/73 (03 Jun 2025 07:24) (137/57 - 199/84)  BP(mean): 82 (02 Jun 2025 10:00) (82 - 82)  RR: 18 (03 Jun 2025 07:24) (17 - 19)  SpO2: 96% (03 Jun 2025 07:24) (96% - 100%)    Parameters below as of 03 Jun 2025 07:24  Patient On (Oxygen Delivery Method): room air           Physical Exam:  General: No acute distress.   HEENT: Head normocephalic, atraumatic. Conjunctivae clear w/o exudates or hemorrhage. Sclera non-icteric. Nares are patent bilaterally. Mucous membranes pink and moist.   Cardiac: Normal rate and rhythm.    Respiratory: Lung sounds clear in all fields. Chest wall symmetric, nontender.   Abdominal: Soft, nondistended, nontender. Bowel sounds normoactive x 4 quadrants.    Skin: Skin is warm, dry   Extremities: No edema     Detailed Neurologic Exam:    Mental status: The patient is awake and alert and has normal attention span.  The patient is fully oriented in 3 spheres. The patient is able to name objects, follow commands, repeat sentences.  Cranial nerves: Pupils equal and react symmetrically to light. There is no visual field deficit to confrontation. Extraocular motion is full with no nystagmus. There is no ptosis. Facial sensation is intact. Facial musculature is symmetric. Palate elevates symmetrically. Tongue is midline.  Motor: There is normal bulk and tone.  There is no tremor.  Strength is 5/5 in the right arm and leg.   Strength is 5/5 in the left arm and leg.  Sensation: Intact to light touch in 4 extremities  Cerebellar: There is no dysmetria on finger to nose testing.  Gait : deferred    LABS:                        7.5    6.70  )-----------( 229      ( 03 Jun 2025 04:39 )             24.8    06-03    141  |  107  |  32.3[H]  ----------------------------<  145[H]  4.7   |  21.0[L]  |  2.38[H]    Ca    8.2[L]      03 Jun 2025 04:39  Phos  4.4     06-03  Mg     2.1     06-03    TPro  5.9[L]  /  Alb  2.9[L]  /  TBili  <0.2[L]  /  DBili  x   /  AST  13  /  ALT  6   /  AlkPhos  68  06-03  PT/INR - ( 01 Jun 2025 09:57 )   PT: 13.7 sec;   INR: 1.21 ratio         PTT - ( 01 Jun 2025 09:57 )  PTT:27.7 sec      06-02 Chol 200[H] LDL - 131 - HDL 38[L] Trig 171[H]    A1C: 8.7 % (06.02.25 @ 06:42)     IMAGING: Reviewed by me.    Neuro-Imaging   (06.01.25 @ 09:08)  IMPRESSION:  CT HEAD: No acute intracranial findings.  CT PERFUSION: No focal perfusion deficit.  CT ANGIOGRAPHY NECK: Right carotid stent appears patent. Short-segment   moderatenarrowing proximal left cervical ICA.  CT ANGIOGRAPHY HEAD: Moderate narrowing left cavernous segment ICA.   Severe narrowing right distal petrous/proximal cavernous segment ICA.   Mild to moderate narrowing right paraclinoid segment ICA. Focal moderate   narrowing right proximal P2 segment. Focal near-complete occlusion right   distal P2 segment.  --  ULTRASOUND   US Duplex Venous Lower Ext Complete, Bilateral (06.01.25 @ 15:39)  IMPRESSION:  No evidence of deep venous thrombosis in either lower extremity.  --  CARDIOLOGY   TTE W or WO Ultrasound Enhancing Agent (06.01.25 @ 13:50)  CONCLUSIONS:      1. Left ventricular systolic function is normal with an ejection fraction visually estimated at 65 %. There are no regional wall motion abnormalities seen.   2. Mild left ventricular hypertrophy.   3. There is mild (grade 1) left ventricular diastolic dysfunction, with normal left ventricular filling pressure.   4. Normal right ventricular cavity size.   5. Left atrium is normal in size.   6. There is mild posterior calcification of the mitral valve annulus.   7. Estimatedpulmonary artery systolic pressure is 22 mmHg.   8. Fibrocalcific aortic valve sclerosis without stenosis.   9. The interatrial septum appears intact.  10. No pericardial effusion seen.  --  XRAY  Xray Chest 1 View AP/PA. (06.01.25 @ 09:35)   HEART:difficult to access in this projection.  LUNGS: retrocardiac infiltrate..  BONES: degenerative changes                    [facility] Stroke Team  Progress Note     HPI:    Ms. Stephens is a 85 y/o woman with hx of prior stroke in 2022, hx of Right carotid stent > 5 years for asymptomatic carotid disease, PE in 2022 s/p thrombectomy post-covid vaccine, on Apixaban 2.5mg BID and ASA, DM, CKD, Anemia, HLD who presented for transient episode of slurred speech and Rt arm weakness. Last well known last evening prior to presentation, woke up  AM of admission at 7:30AM and noted that she had difficulty raising her right arm and pulling herself up.  No other focal weakness. Her children also noted she was slurring her words. Upon arrival here, no more symptoms were noted.      Denied any prior episode.  Admitted to noncompliance with Apixaban and misses nightly dose regularly.  Follows with hematology/pulm/nephro.  Has known anemia and CKD.     SUBJECTIVE: No events overnight.  No new neurologic complaints.  ROS reported negative unless otherwise noted.    acetaminophen     Tablet .. 650 milliGRAM(s) Oral every 6 hours PRN  apixaban 2.5 milliGRAM(s) Oral every 12 hours  ascorbic acid 500 milliGRAM(s) Oral daily  aspirin enteric coated 81 milliGRAM(s) Oral daily  atenolol  Tablet 100 milliGRAM(s) Oral daily  atorvastatin 80 milliGRAM(s) Oral at bedtime  cefTRIAXone Injectable. 1000 milliGRAM(s) IV Push every 24 hours  dextrose 5%. 1000 milliLiter(s) IV Continuous <Continuous>  dextrose 5%. 1000 milliLiter(s) IV Continuous <Continuous>  dextrose 50% Injectable 25 Gram(s) IV Push once  dextrose 50% Injectable 12.5 Gram(s) IV Push once  dextrose 50% Injectable 25 Gram(s) IV Push once  dextrose Oral Gel 15 Gram(s) Oral once PRN  ferrous    sulfate 325 milliGRAM(s) Oral every 8 hours  glucagon  Injectable 1 milliGRAM(s) IntraMuscular once  hydrALAZINE Injectable 10 milliGRAM(s) IV Push every 4 hours PRN  insulin glargine Injectable (LANTUS) 12 Unit(s) SubCutaneous at bedtime  insulin lispro (ADMELOG) corrective regimen sliding scale   SubCutaneous Before meals and at bedtime  labetalol Injectable 10 milliGRAM(s) IV Push every 4 hours PRN  polyethylene glycol 3350 17 Gram(s) Oral at bedtime PRN  senna 2 Tablet(s) Oral at bedtime  sodium chloride 0.9%. 1000 milliLiter(s) IV Continuous <Continuous>      PHYSICAL EXAM:   Vital Signs Last 24 Hrs  T(C): 37 (03 Jun 2025 07:24), Max: 37.3 (03 Jun 2025 00:13)  T(F): 98.6 (03 Jun 2025 07:24), Max: 99.1 (03 Jun 2025 00:13)  HR: 78 (03 Jun 2025 07:24) (78 - 91)  BP: 159/73 (03 Jun 2025 07:24) (137/57 - 199/84)  BP(mean): 82 (02 Jun 2025 10:00) (82 - 82)  RR: 18 (03 Jun 2025 07:24) (17 - 19)  SpO2: 96% (03 Jun 2025 07:24) (96% - 100%)    Parameters below as of 03 Jun 2025 07:24  Patient On (Oxygen Delivery Method): room air           Physical Exam:  General: No acute distress.   HEENT: Head normocephalic, atraumatic. Conjunctivae clear w/o exudates or hemorrhage. Sclera non-icteric. Nares are patent bilaterally. Mucous membranes pink and moist.   Cardiac: Normal rate and rhythm.    Respiratory: Lung sounds clear in all fields. Chest wall symmetric, nontender.   Abdominal: Soft, nondistended, nontender. Bowel sounds normoactive x 4 quadrants.    Skin: Skin is warm, dry   Extremities: No edema     Detailed Neurologic Exam:    Mental status: The patient is awake and alert and has normal attention span.  The patient is fully oriented in 3 spheres. The patient is able to name objects, follow commands, repeat sentences.  Cranial nerves: Pupils equal and react symmetrically to light. There is no visual field deficit to confrontation. Extraocular motion is full with no nystagmus. There is no ptosis. Facial sensation is intact. Facial musculature is symmetric. Palate elevates symmetrically. Tongue is midline.  Motor: There is normal bulk and tone.  There is no tremor.  Strength is 5/5 in the right arm and leg.   Strength is 5/5 in the left arm and leg.  Sensation: Intact to light touch in 4 extremities  Cerebellar: There is no dysmetria on finger to nose testing.  Gait : deferred    LABS:                        7.5    6.70  )-----------( 229      ( 03 Jun 2025 04:39 )             24.8    06-03    141  |  107  |  32.3[H]  ----------------------------<  145[H]  4.7   |  21.0[L]  |  2.38[H]    Ca    8.2[L]      03 Jun 2025 04:39  Phos  4.4     06-03  Mg     2.1     06-03    TPro  5.9[L]  /  Alb  2.9[L]  /  TBili  <0.2[L]  /  DBili  x   /  AST  13  /  ALT  6   /  AlkPhos  68  06-03  PT/INR - ( 01 Jun 2025 09:57 )   PT: 13.7 sec;   INR: 1.21 ratio         PTT - ( 01 Jun 2025 09:57 )  PTT:27.7 sec      06-02 Chol 200[H] LDL - 131 - HDL 38[L] Trig 171[H]    A1C: 8.7 % (06.02.25 @ 06:42)     IMAGING: Reviewed by me.    Neuro-Imaging   (06.01.25 @ 09:08)  IMPRESSION:  CT HEAD: No acute intracranial findings.  CT PERFUSION: No focal perfusion deficit.  CT ANGIOGRAPHY NECK: Right carotid stent appears patent. Short-segment   moderatenarrowing proximal left cervical ICA.  CT ANGIOGRAPHY HEAD: Moderate narrowing left cavernous segment ICA.   Severe narrowing right distal petrous/proximal cavernous segment ICA.   Mild to moderate narrowing right paraclinoid segment ICA. Focal moderate   narrowing right proximal P2 segment. Focal near-complete occlusion right   distal P2 segment.  --  ULTRASOUND   US Duplex Venous Lower Ext Complete, Bilateral (06.01.25 @ 15:39)  IMPRESSION:  No evidence of deep venous thrombosis in either lower extremity.  --  CARDIOLOGY   TTE W or WO Ultrasound Enhancing Agent (06.01.25 @ 13:50)  CONCLUSIONS:      1. Left ventricular systolic function is normal with an ejection fraction visually estimated at 65 %. There are no regional wall motion abnormalities seen.   2. Mild left ventricular hypertrophy.   3. There is mild (grade 1) left ventricular diastolic dysfunction, with normal left ventricular filling pressure.   4. Normal right ventricular cavity size.   5. Left atrium is normal in size.   6. There is mild posterior calcification of the mitral valve annulus.   7. Estimatedpulmonary artery systolic pressure is 22 mmHg.   8. Fibrocalcific aortic valve sclerosis without stenosis.   9. The interatrial septum appears intact.  10. No pericardial effusion seen.  --  XRAY  Xray Chest 1 View AP/PA. (06.01.25 @ 09:35)   HEART:difficult to access in this projection.  LUNGS: retrocardiac infiltrate..  BONES: degenerative changes

## 2025-06-03 NOTE — PROGRESS NOTE ADULT - SUBJECTIVE AND OBJECTIVE BOX
SHARLA SANCHES    [id number]    84y      Female    Patient is a 84y old  Female who presents with a chief complaint of TIA (03 Jun 2025 08:53)      INTERVAL HPI/OVERNIGHT EVENTS:    Patient is feel tiredness after working with PT, denies fever, chills, chest pain, weakness, numbness       Vital Signs Last 24 Hrs  T(C): 37 (03 Jun 2025 07:24), Max: 37.3 (03 Jun 2025 00:13)  T(F): 98.6 (03 Jun 2025 07:24), Max: 99.1 (03 Jun 2025 00:13)  HR: 78 (03 Jun 2025 07:24) (78 - 91)  BP: 159/73 (03 Jun 2025 07:24) (156/49 - 199/84)  RR: 18 (03 Jun 2025 07:24) (17 - 19)  SpO2: 96% (03 Jun 2025 07:24) (96% - 100%)    Parameters below as of 03 Jun 2025 07:24  Patient On (Oxygen Delivery Method): room air        PHYSICAL EXAM:    GENERAL: Elderly female looking comfortable  HEENT: PERRL, +EOMI  NECK: soft, Supple, No JVD  CHEST/LUNG: Clear to auscultate bilaterally; No wheezing  HEART: S1S2+, Regular rate and rhythm; No murmurs  ABDOMEN: Soft, Nontender, Nondistended; Bowel sounds present  EXTREMITIES:  1+ Peripheral Pulses, No edema  SKIN: No rashes or lesions  NEURO: AAOX3  PSYCH: normal mood      LABS:                        7.5    6.70  )-----------( 229      ( 03 Jun 2025 04:39 )             24.8     06-03    141  |  107  |  32.3[H]  ----------------------------<  145[H]  4.7   |  21.0[L]  |  2.38[H]    Ca    8.2[L]      03 Jun 2025 04:39  Phos  4.4     06-03  Mg     2.1     06-03    TPro  5.9[L]  /  Alb  2.9[L]  /  TBili  <0.2[L]  /  DBili  x   /  AST  13  /  ALT  6   /  AlkPhos  68  06-03        I&O's Summary      MEDICATIONS  (STANDING):  apixaban 2.5 milliGRAM(s) Oral every 12 hours  ascorbic acid 500 milliGRAM(s) Oral daily  aspirin enteric coated 81 milliGRAM(s) Oral daily  atenolol  Tablet 100 milliGRAM(s) Oral daily  atorvastatin 80 milliGRAM(s) Oral at bedtime  cefTRIAXone Injectable. 1000 milliGRAM(s) IV Push every 24 hours  dextrose 5%. 1000 milliLiter(s) (100 mL/Hr) IV Continuous <Continuous>  dextrose 5%. 1000 milliLiter(s) (50 mL/Hr) IV Continuous <Continuous>  dextrose 50% Injectable 25 Gram(s) IV Push once  dextrose 50% Injectable 12.5 Gram(s) IV Push once  dextrose 50% Injectable 25 Gram(s) IV Push once  ferrous    sulfate 325 milliGRAM(s) Oral every 8 hours  glucagon  Injectable 1 milliGRAM(s) IntraMuscular once  hydrALAZINE 25 milliGRAM(s) Oral three times a day  insulin glargine Injectable (LANTUS) 12 Unit(s) SubCutaneous at bedtime  insulin lispro (ADMELOG) corrective regimen sliding scale   SubCutaneous Before meals and at bedtime  senna 2 Tablet(s) Oral at bedtime  sodium chloride 0.9%. 1000 milliLiter(s) (75 mL/Hr) IV Continuous <Continuous>    MEDICATIONS  (PRN):  acetaminophen     Tablet .. 650 milliGRAM(s) Oral every 6 hours PRN Temp greater or equal to 38C (100.4F), Moderate Pain (4 - 6)  dextrose Oral Gel 15 Gram(s) Oral once PRN Blood Glucose LESS THAN 70 milliGRAM(s)/deciliter  hydrALAZINE Injectable 10 milliGRAM(s) IV Push every 4 hours   labetalol Injectable 10 milliGRAM(s) IV Push every 4 hours PRN SBP > 190  polyethylene glycol 3350 17 Gram(s) Oral at bedtime PRN Constipation

## 2025-06-04 LAB
ANION GAP SERPL CALC-SCNC: 15 MMOL/L — SIGNIFICANT CHANGE UP (ref 5–17)
BUN SERPL-MCNC: 38.6 MG/DL — HIGH (ref 8–20)
CALCIUM SERPL-MCNC: 8.1 MG/DL — LOW (ref 8.4–10.5)
CHLORIDE SERPL-SCNC: 106 MMOL/L — SIGNIFICANT CHANGE UP (ref 96–108)
CO2 SERPL-SCNC: 20 MMOL/L — LOW (ref 22–29)
CREAT SERPL-MCNC: 2.65 MG/DL — HIGH (ref 0.5–1.3)
EGFR: 17 ML/MIN/1.73M2 — LOW
EGFR: 17 ML/MIN/1.73M2 — LOW
GLUCOSE BLDC GLUCOMTR-MCNC: 157 MG/DL — HIGH (ref 70–99)
GLUCOSE BLDC GLUCOMTR-MCNC: 175 MG/DL — HIGH (ref 70–99)
GLUCOSE BLDC GLUCOMTR-MCNC: 208 MG/DL — HIGH (ref 70–99)
GLUCOSE BLDC GLUCOMTR-MCNC: 279 MG/DL — HIGH (ref 70–99)
GLUCOSE SERPL-MCNC: 150 MG/DL — HIGH (ref 70–99)
HCT VFR BLD CALC: 25.4 % — LOW (ref 34.5–45)
HGB BLD-MCNC: 7.6 G/DL — LOW (ref 11.5–15.5)
MAGNESIUM SERPL-MCNC: 2.1 MG/DL — SIGNIFICANT CHANGE UP (ref 1.6–2.6)
MCHC RBC-ENTMCNC: 23.3 PG — LOW (ref 27–34)
MCHC RBC-ENTMCNC: 29.9 G/DL — LOW (ref 32–36)
MCV RBC AUTO: 77.9 FL — LOW (ref 80–100)
NRBC # BLD AUTO: 0 K/UL — SIGNIFICANT CHANGE UP (ref 0–0)
NRBC # FLD: 0 K/UL — SIGNIFICANT CHANGE UP (ref 0–0)
NRBC BLD AUTO-RTO: 0 /100 WBCS — SIGNIFICANT CHANGE UP (ref 0–0)
PHOSPHATE SERPL-MCNC: 4.4 MG/DL — SIGNIFICANT CHANGE UP (ref 2.4–4.7)
PLATELET # BLD AUTO: 252 K/UL — SIGNIFICANT CHANGE UP (ref 150–400)
PMV BLD: 10.5 FL — SIGNIFICANT CHANGE UP (ref 7–13)
POTASSIUM SERPL-MCNC: 4.8 MMOL/L — SIGNIFICANT CHANGE UP (ref 3.5–5.3)
POTASSIUM SERPL-SCNC: 4.8 MMOL/L — SIGNIFICANT CHANGE UP (ref 3.5–5.3)
RBC # BLD: 3.26 M/UL — LOW (ref 3.8–5.2)
RBC # FLD: 17.4 % — HIGH (ref 10.3–14.5)
SODIUM SERPL-SCNC: 140 MMOL/L — SIGNIFICANT CHANGE UP (ref 135–145)
WBC # BLD: 7.35 K/UL — SIGNIFICANT CHANGE UP (ref 3.8–10.5)
WBC # FLD AUTO: 7.35 K/UL — SIGNIFICANT CHANGE UP (ref 3.8–10.5)

## 2025-06-04 PROCEDURE — 99232 SBSQ HOSP IP/OBS MODERATE 35: CPT

## 2025-06-04 PROCEDURE — 36410 VNPNXR 3YR/> PHY/QHP DX/THER: CPT | Mod: 59

## 2025-06-04 PROCEDURE — 76937 US GUIDE VASCULAR ACCESS: CPT | Mod: 26,59

## 2025-06-04 PROCEDURE — 76942 ECHO GUIDE FOR BIOPSY: CPT | Mod: 26,59

## 2025-06-04 PROCEDURE — 36556 INSERT NON-TUNNEL CV CATH: CPT

## 2025-06-04 PROCEDURE — 99233 SBSQ HOSP IP/OBS HIGH 50: CPT

## 2025-06-04 RX ORDER — SODIUM BICARBONATE 1 MEQ/ML
650 SYRINGE (ML) INTRAVENOUS
Refills: 0 | Status: DISCONTINUED | OUTPATIENT
Start: 2025-06-04 | End: 2025-06-06

## 2025-06-04 RX ORDER — IRON SUCROSE 20 MG/ML
100 INJECTION, SOLUTION INTRAVENOUS EVERY 24 HOURS
Refills: 0 | Status: COMPLETED | OUTPATIENT
Start: 2025-06-04 | End: 2025-06-07

## 2025-06-04 RX ORDER — IRON SUCROSE 20 MG/ML
100 INJECTION, SOLUTION INTRAVENOUS EVERY 24 HOURS
Refills: 0 | Status: DISCONTINUED | OUTPATIENT
Start: 2025-06-04 | End: 2025-06-04

## 2025-06-04 RX ADMIN — APIXABAN 2.5 MILLIGRAM(S): 2.5 TABLET, FILM COATED ORAL at 17:07

## 2025-06-04 RX ADMIN — Medication 2 TABLET(S): at 21:58

## 2025-06-04 RX ADMIN — INSULIN LISPRO 6: 100 INJECTION, SOLUTION INTRAVENOUS; SUBCUTANEOUS at 17:08

## 2025-06-04 RX ADMIN — Medication 325 MILLIGRAM(S): at 05:33

## 2025-06-04 RX ADMIN — CEFTRIAXONE 1000 MILLIGRAM(S): 500 INJECTION, POWDER, FOR SOLUTION INTRAMUSCULAR; INTRAVENOUS at 21:58

## 2025-06-04 RX ADMIN — Medication 81 MILLIGRAM(S): at 12:02

## 2025-06-04 RX ADMIN — INSULIN LISPRO 4: 100 INJECTION, SOLUTION INTRAVENOUS; SUBCUTANEOUS at 12:03

## 2025-06-04 RX ADMIN — INSULIN LISPRO 3 UNIT(S): 100 INJECTION, SOLUTION INTRAVENOUS; SUBCUTANEOUS at 12:04

## 2025-06-04 RX ADMIN — INSULIN LISPRO 3 UNIT(S): 100 INJECTION, SOLUTION INTRAVENOUS; SUBCUTANEOUS at 08:48

## 2025-06-04 RX ADMIN — APIXABAN 2.5 MILLIGRAM(S): 2.5 TABLET, FILM COATED ORAL at 05:33

## 2025-06-04 RX ADMIN — INSULIN LISPRO 3 UNIT(S): 100 INJECTION, SOLUTION INTRAVENOUS; SUBCUTANEOUS at 17:09

## 2025-06-04 RX ADMIN — Medication 500 MILLIGRAM(S): at 12:02

## 2025-06-04 RX ADMIN — ATORVASTATIN CALCIUM 80 MILLIGRAM(S): 80 TABLET, FILM COATED ORAL at 21:58

## 2025-06-04 RX ADMIN — Medication 325 MILLIGRAM(S): at 12:02

## 2025-06-04 RX ADMIN — Medication 25 MILLIGRAM(S): at 00:24

## 2025-06-04 RX ADMIN — Medication 25 MILLIGRAM(S): at 05:32

## 2025-06-04 RX ADMIN — INSULIN LISPRO 2: 100 INJECTION, SOLUTION INTRAVENOUS; SUBCUTANEOUS at 21:57

## 2025-06-04 RX ADMIN — Medication 325 MILLIGRAM(S): at 21:58

## 2025-06-04 RX ADMIN — INSULIN GLARGINE-YFGN 15 UNIT(S): 100 INJECTION, SOLUTION SUBCUTANEOUS at 21:58

## 2025-06-04 RX ADMIN — Medication 25 MILLIGRAM(S): at 12:03

## 2025-06-04 RX ADMIN — LISINOPRIL 100 MILLIGRAM(S): 30 TABLET ORAL at 05:32

## 2025-06-04 RX ADMIN — INSULIN LISPRO 2: 100 INJECTION, SOLUTION INTRAVENOUS; SUBCUTANEOUS at 08:47

## 2025-06-04 RX ADMIN — Medication 25 MILLIGRAM(S): at 21:58

## 2025-06-04 NOTE — CONSULT NOTE ADULT - ASSESSMENT
JENNIFER on CKD III - Prior creat 1.8   Metformin and Enalapril held   No hydro noted on UDS 6/3/25   S/P CTA 6/1/25   Suspect episode RCN / ATN   Watch for now   Check FENA       HTN - Better w/ med adjustments     MA - Add oral Bicarb bid     Anemia - Getting transfusion of one unit today   Check iron stores , B12 , folate   If BP stable tomorrow , will add Retacrit  JENNIFER on CKD III - Prior creat 1.8   Metformin and Enalapril held   No hydro noted on UDS 6/3/25   S/P CTA 6/1/25   Suspect episode RCN / ATN   Watch for now   Check FENA       HTN - Better w/ med adjustments     MA - Add oral Bicarb bid     Anemia - Getting transfusion of one unit today   Checked iron stores --->  can add Venofer x 3   B 12 and folate OK   If BP stable tomorrow , will add Retacrit

## 2025-06-04 NOTE — PROCEDURE NOTE - ADDITIONAL PROCEDURE DETAILS
#18G 10CM 31CIRC BARD POWER GLIDE MIDLINE inserted with ultrasound guidance.   Good flash, ns flush right cephalic vein.   Patient tolerated well.

## 2025-06-04 NOTE — PROGRESS NOTE ADULT - SUBJECTIVE AND OBJECTIVE BOX
Preliminary note, official recommendations pending attending review/signature   Seen and examined by Stroke team attending/team, assessment/ plan as discussed with stroke team attending/team as noted.     [facility] Stroke Team  Progress Note      HPI:  Ms. Stephens is a 83 y/o woman with hx of prior stroke in 2022, hx of Right carotid stent > 5 years for asymptomatic carotid disease, PE in 2022 s/p thrombectomy post-covid vaccine, on Apixaban 2.5mg BID and ASA, DM, CKD, Anemia, HLD who presented for transient episode of slurred speech and Rt arm weakness. Last well known last evening prior to presentation, woke up  AM of admission at 7:30AM and noted that she had difficulty raising her right arm and pulling herself up.  No other focal weakness. Her children also noted she was slurring her words. Upon arrival here, no more symptoms were noted.      Denied any prior episode.  Admitted to noncompliance with Apixaban and misses nightly dose regularly.  Follows with hematology/pulm/nephro.  Has known anemia and CKD.     SUBJECTIVE: No events overnight.  No new neurologic complaints.  ROS reported negative unless otherwise noted.    acetaminophen     Tablet .. 650 milliGRAM(s) Oral every 6 hours PRN  apixaban 2.5 milliGRAM(s) Oral every 12 hours  ascorbic acid 500 milliGRAM(s) Oral daily  aspirin enteric coated 81 milliGRAM(s) Oral daily  atenolol  Tablet 100 milliGRAM(s) Oral daily  atorvastatin 80 milliGRAM(s) Oral at bedtime  cefTRIAXone Injectable. 1000 milliGRAM(s) IV Push every 24 hours  dextrose 5%. 1000 milliLiter(s) IV Continuous <Continuous>  dextrose 5%. 1000 milliLiter(s) IV Continuous <Continuous>  dextrose 50% Injectable 25 Gram(s) IV Push once  dextrose 50% Injectable 12.5 Gram(s) IV Push once  dextrose 50% Injectable 25 Gram(s) IV Push once  dextrose Oral Gel 15 Gram(s) Oral once PRN  ferrous    sulfate 325 milliGRAM(s) Oral every 8 hours  glucagon  Injectable 1 milliGRAM(s) IntraMuscular once  hydrALAZINE 25 milliGRAM(s) Oral three times a day  hydrALAZINE Injectable 10 milliGRAM(s) IV Push every 4 hours PRN  insulin glargine Injectable (LANTUS) 15 Unit(s) SubCutaneous at bedtime  insulin lispro (ADMELOG) corrective regimen sliding scale   SubCutaneous Before meals and at bedtime  insulin lispro Injectable (ADMELOG) 3 Unit(s) SubCutaneous three times a day before meals  labetalol Injectable 10 milliGRAM(s) IV Push every 4 hours PRN  polyethylene glycol 3350 17 Gram(s) Oral at bedtime PRN  senna 2 Tablet(s) Oral at bedtime  sodium chloride 0.9%. 1000 milliLiter(s) IV Continuous <Continuous>      PHYSICAL EXAM:   Vital Signs Last 24 Hrs  T(C): 37 (04 Jun 2025 04:17), Max: 37 (03 Jun 2025 12:53)  T(F): 98.6 (04 Jun 2025 04:17), Max: 98.6 (03 Jun 2025 12:53)  HR: 79 (04 Jun 2025 04:17) (67 - 82)  BP: 145/60 (04 Jun 2025 04:17) (145/60 - 175/68)  BP(mean): --  RR: 18 (04 Jun 2025 04:17) (17 - 18)  SpO2: 95% (04 Jun 2025 04:17) (94% - 97%)    Parameters below as of 04 Jun 2025 04:17  Patient On (Oxygen Delivery Method): room air    EXAM PENDING     Physical Exam:  General: No acute distress.   HEENT: Head normocephalic, atraumatic. Conjunctivae clear w/o exudates or hemorrhage. Sclera non-icteric. Nares are patent bilaterally. Mucous membranes pink and moist.   Cardiac: Normal rate and rhythm.    Respiratory: Lung sounds clear in all fields. Chest wall symmetric, nontender.   Abdominal: Soft, nondistended, nontender. Bowel sounds normoactive x 4 quadrants.    Skin: Skin is warm, dry   Extremities: No edema     Detailed Neurologic Exam:    Mental status: The patient is awake and alert and has normal attention span.  The patient is fully oriented in 3 spheres. The patient is able to name objects, follow commands, repeat sentences.  Cranial nerves: Pupils equal and react symmetrically to light. There is no visual field deficit to confrontation. Extraocular motion is full with no nystagmus. There is no ptosis. Facial sensation is intact. Facial musculature is symmetric. Palate elevates symmetrically. Tongue is midline.  Motor: There is normal bulk and tone.  There is no tremor.  Strength is 5/5 in the right arm and leg.   Strength is 5/5 in the left arm and leg.  Sensation: Intact to light touch in 4 extremities  Cerebellar: There is no dysmetria on finger to nose testing.  Gait :     LABS:                        7.6    7.35  )-----------( 252      ( 04 Jun 2025 04:41 )             25.4    06-04    140  |  106  |  38.6[H]  ----------------------------<  150[H]  4.8   |  20.0[L]  |  2.65[H]    Ca    8.1[L]      04 Jun 2025 04:41  Phos  4.4     06-04  Mg     2.1     06-04    TPro  5.9[L]  /  Alb  2.9[L]  /  TBili  <0.2[L]  /  DBili  x   /  AST  13  /  ALT  6   /  AlkPhos  68  06-03 06-02 Chol 200[H] LDL - 131 - HDL 38[L] Trig 171[H]    A1C: 8.7 % (06.02.25 @ 06:42)     IMAGING: Reviewed by me.    Neuro-Imaging     MR Head No Cont (06.03.25 @ 16:29)   IMPRESSION:  No evidence of a mass or current evidence of acute ischemia. Chronic   small infarcts and white matter ischemic changes.     (06.01.25 @ 09:08)  IMPRESSION:  CT HEAD: No acute intracranial findings.  CT PERFUSION: No focal perfusion deficit.  CT ANGIOGRAPHY NECK: Right carotid stent appears patent. Short-segment   moderatenarrowing proximal left cervical ICA.  CT ANGIOGRAPHY HEAD: Moderate narrowing left cavernous segment ICA.   Severe narrowing right distal petrous/proximal cavernous segment ICA.   Mild to moderate narrowing right paraclinoid segment ICA. Focal moderate   narrowing right proximal P2 segment. Focal near-complete occlusion right   distal P2 segment.  --  ULTRASOUND   US Renal (06.03.25 @ 16:17)   Limited study.  Atrophic kidneys without hydronephrosis, likely reflecting medical renal   disease.  Evaluation of the right and left renal parenchyma is markedly limited due   to shadowing artifact.    US Duplex Venous Lower Ext Complete, Bilateral (06.01.25 @ 15:39)  IMPRESSION:  No evidence of deep venous thrombosis in either lower extremity.  --  CARDIOLOGY   TTE W or WO Ultrasound Enhancing Agent (06.01.25 @ 13:50)  CONCLUSIONS:      1. Left ventricular systolic function is normal with an ejection fraction visually estimated at 65 %. There are no regional wall motion abnormalities seen.   2. Mild left ventricular hypertrophy.   3. There is mild (grade 1) left ventricular diastolic dysfunction, with normal left ventricular filling pressure.   4. Normal right ventricular cavity size.   5. Left atrium is normal in size.   6. There is mild posterior calcification of the mitral valve annulus.   7. Estimatedpulmonary artery systolic pressure is 22 mmHg.   8. Fibrocalcific aortic valve sclerosis without stenosis.   9. The interatrial septum appears intact.  10. No pericardial effusion seen.  --  XRAY  Xray Chest 1 View AP/PA. (06.01.25 @ 09:35)   HEART:difficult to access in this projection.  LUNGS: retrocardiac infiltrate..  BONES: degenerative changes            Preliminary note, official recommendations pending attending review/signature   Seen and examined by Stroke team attending/team, assessment/ plan as discussed with stroke team attending/team as noted.     [facility] Stroke Team  Progress Note      HPI:  Ms. Stephens is a 83 y/o woman with hx of prior stroke in 2022, hx of Right carotid stent > 5 years for asymptomatic carotid disease, PE in 2022 s/p thrombectomy post-covid vaccine, on Apixaban 2.5mg BID and ASA, DM, CKD, Anemia, HLD who presented for transient episode of slurred speech and Rt arm weakness. Last well known last evening prior to presentation, woke up  AM of admission at 7:30AM and noted that she had difficulty raising her right arm and pulling herself up.  No other focal weakness. Her children also noted she was slurring her words. Upon arrival here, no more symptoms were noted.      Denied any prior episode.  Admitted to noncompliance with Apixaban and misses nightly dose regularly.  Follows with hematology/pulm/nephro.  Has known anemia and CKD.     SUBJECTIVE: No events overnight.  No new neurologic complaints.  ROS reported negative unless otherwise noted.    acetaminophen     Tablet .. 650 milliGRAM(s) Oral every 6 hours PRN  apixaban 2.5 milliGRAM(s) Oral every 12 hours  ascorbic acid 500 milliGRAM(s) Oral daily  aspirin enteric coated 81 milliGRAM(s) Oral daily  atenolol  Tablet 100 milliGRAM(s) Oral daily  atorvastatin 80 milliGRAM(s) Oral at bedtime  cefTRIAXone Injectable. 1000 milliGRAM(s) IV Push every 24 hours  dextrose 5%. 1000 milliLiter(s) IV Continuous <Continuous>  dextrose 5%. 1000 milliLiter(s) IV Continuous <Continuous>  dextrose 50% Injectable 25 Gram(s) IV Push once  dextrose 50% Injectable 12.5 Gram(s) IV Push once  dextrose 50% Injectable 25 Gram(s) IV Push once  dextrose Oral Gel 15 Gram(s) Oral once PRN  ferrous    sulfate 325 milliGRAM(s) Oral every 8 hours  glucagon  Injectable 1 milliGRAM(s) IntraMuscular once  hydrALAZINE 25 milliGRAM(s) Oral three times a day  hydrALAZINE Injectable 10 milliGRAM(s) IV Push every 4 hours PRN  insulin glargine Injectable (LANTUS) 15 Unit(s) SubCutaneous at bedtime  insulin lispro (ADMELOG) corrective regimen sliding scale   SubCutaneous Before meals and at bedtime  insulin lispro Injectable (ADMELOG) 3 Unit(s) SubCutaneous three times a day before meals  labetalol Injectable 10 milliGRAM(s) IV Push every 4 hours PRN  polyethylene glycol 3350 17 Gram(s) Oral at bedtime PRN  senna 2 Tablet(s) Oral at bedtime  sodium chloride 0.9%. 1000 milliLiter(s) IV Continuous <Continuous>      PHYSICAL EXAM:   Vital Signs Last 24 Hrs  T(C): 37 (04 Jun 2025 04:17), Max: 37 (03 Jun 2025 12:53)  T(F): 98.6 (04 Jun 2025 04:17), Max: 98.6 (03 Jun 2025 12:53)  HR: 79 (04 Jun 2025 04:17) (67 - 82)  BP: 145/60 (04 Jun 2025 04:17) (145/60 - 175/68)  BP(mean): --  RR: 18 (04 Jun 2025 04:17) (17 - 18)  SpO2: 95% (04 Jun 2025 04:17) (94% - 97%)    Parameters below as of 04 Jun 2025 04:17  Patient On (Oxygen Delivery Method): room air         Physical Exam:  General: No acute distress.   HEENT: Head normocephalic, atraumatic. Conjunctivae clear w/o exudates or hemorrhage. Sclera non-icteric. Nares are patent bilaterally. Mucous membranes pink and moist.   Cardiac: Normal rate and rhythm.    Respiratory: Lung sounds clear in all fields. Chest wall symmetric, nontender.   Abdominal: Soft, nondistended, nontender. Bowel sounds normoactive x 4 quadrants.    Skin: Skin is warm, dry   Extremities: No edema     Detailed Neurologic Exam:    Mental status: The patient is awake and alert and has normal attention span.  The patient is fully oriented in 3 spheres. The patient is able to name objects, follow commands, repeat sentences.  Cranial nerves: Pupils equal and react symmetrically to light. There is no visual field deficit to confrontation. Extraocular motion is full with no nystagmus. There is no ptosis. Facial sensation is intact. Facial musculature is symmetric. Palate elevates symmetrically. Tongue is midline.  Motor: There is normal bulk and tone.  There is no tremor.  Strength is 5/5 in the right arm and leg.   Strength is 5/5 in the left arm and leg.  Sensation: Intact to light touch in 4 extremities  Cerebellar: There is no dysmetria on finger to nose testing.  Gait :     LABS:                        7.6    7.35  )-----------( 252      ( 04 Jun 2025 04:41 )             25.4    06-04    140  |  106  |  38.6[H]  ----------------------------<  150[H]  4.8   |  20.0[L]  |  2.65[H]    Ca    8.1[L]      04 Jun 2025 04:41  Phos  4.4     06-04  Mg     2.1     06-04    TPro  5.9[L]  /  Alb  2.9[L]  /  TBili  <0.2[L]  /  DBili  x   /  AST  13  /  ALT  6   /  AlkPhos  68  06-03 06-02 Chol 200[H] LDL - 131 - HDL 38[L] Trig 171[H]    A1C: 8.7 % (06.02.25 @ 06:42)     IMAGING: Reviewed by me.    Neuro-Imaging     MR Head No Cont (06.03.25 @ 16:29)   IMPRESSION:  No evidence of a mass or current evidence of acute ischemia. Chronic   small infarcts and white matter ischemic changes.     (06.01.25 @ 09:08)  IMPRESSION:  CT HEAD: No acute intracranial findings.  CT PERFUSION: No focal perfusion deficit.  CT ANGIOGRAPHY NECK: Right carotid stent appears patent. Short-segment   moderatenarrowing proximal left cervical ICA.  CT ANGIOGRAPHY HEAD: Moderate narrowing left cavernous segment ICA.   Severe narrowing right distal petrous/proximal cavernous segment ICA.   Mild to moderate narrowing right paraclinoid segment ICA. Focal moderate   narrowing right proximal P2 segment. Focal near-complete occlusion right   distal P2 segment.  --  ULTRASOUND   US Renal (06.03.25 @ 16:17)   Limited study.  Atrophic kidneys without hydronephrosis, likely reflecting medical renal   disease.  Evaluation of the right and left renal parenchyma is markedly limited due   to shadowing artifact.    US Duplex Venous Lower Ext Complete, Bilateral (06.01.25 @ 15:39)  IMPRESSION:  No evidence of deep venous thrombosis in either lower extremity.  --  CARDIOLOGY   TTE W or WO Ultrasound Enhancing Agent (06.01.25 @ 13:50)  CONCLUSIONS:      1. Left ventricular systolic function is normal with an ejection fraction visually estimated at 65 %. There are no regional wall motion abnormalities seen.   2. Mild left ventricular hypertrophy.   3. There is mild (grade 1) left ventricular diastolic dysfunction, with normal left ventricular filling pressure.   4. Normal right ventricular cavity size.   5. Left atrium is normal in size.   6. There is mild posterior calcification of the mitral valve annulus.   7. Estimatedpulmonary artery systolic pressure is 22 mmHg.   8. Fibrocalcific aortic valve sclerosis without stenosis.   9. The interatrial septum appears intact.  10. No pericardial effusion seen.  --  XRAY  Xray Chest 1 View AP/PA. (06.01.25 @ 09:35)   HEART:difficult to access in this projection.  LUNGS: retrocardiac infiltrate..  BONES: degenerative changes                [facility] Stroke Team  Progress Note      HPI:  Ms. Stephens is a 83 y/o woman with hx of prior stroke in 2022, hx of Right carotid stent > 5 years for asymptomatic carotid disease, PE in 2022 s/p thrombectomy post-covid vaccine, on Apixaban 2.5mg BID and ASA, DM, CKD, Anemia, HLD who presented for transient episode of slurred speech and Rt arm weakness. Last well known last evening prior to presentation, woke up  AM of admission at 7:30AM and noted that she had difficulty raising her right arm and pulling herself up.  No other focal weakness. Her children also noted she was slurring her words. Upon arrival here, no more symptoms were noted.      Denied any prior episode.  Admitted to noncompliance with Apixaban and misses nightly dose regularly.  Follows with hematology/pulm/nephro.  Has known anemia and CKD.     SUBJECTIVE: No events overnight.  No new neurologic complaints.  ROS reported negative unless otherwise noted.    acetaminophen     Tablet .. 650 milliGRAM(s) Oral every 6 hours PRN  apixaban 2.5 milliGRAM(s) Oral every 12 hours  ascorbic acid 500 milliGRAM(s) Oral daily  aspirin enteric coated 81 milliGRAM(s) Oral daily  atenolol  Tablet 100 milliGRAM(s) Oral daily  atorvastatin 80 milliGRAM(s) Oral at bedtime  cefTRIAXone Injectable. 1000 milliGRAM(s) IV Push every 24 hours  dextrose 5%. 1000 milliLiter(s) IV Continuous <Continuous>  dextrose 5%. 1000 milliLiter(s) IV Continuous <Continuous>  dextrose 50% Injectable 25 Gram(s) IV Push once  dextrose 50% Injectable 12.5 Gram(s) IV Push once  dextrose 50% Injectable 25 Gram(s) IV Push once  dextrose Oral Gel 15 Gram(s) Oral once PRN  ferrous    sulfate 325 milliGRAM(s) Oral every 8 hours  glucagon  Injectable 1 milliGRAM(s) IntraMuscular once  hydrALAZINE 25 milliGRAM(s) Oral three times a day  hydrALAZINE Injectable 10 milliGRAM(s) IV Push every 4 hours PRN  insulin glargine Injectable (LANTUS) 15 Unit(s) SubCutaneous at bedtime  insulin lispro (ADMELOG) corrective regimen sliding scale   SubCutaneous Before meals and at bedtime  insulin lispro Injectable (ADMELOG) 3 Unit(s) SubCutaneous three times a day before meals  labetalol Injectable 10 milliGRAM(s) IV Push every 4 hours PRN  polyethylene glycol 3350 17 Gram(s) Oral at bedtime PRN  senna 2 Tablet(s) Oral at bedtime  sodium chloride 0.9%. 1000 milliLiter(s) IV Continuous <Continuous>      PHYSICAL EXAM:   Vital Signs Last 24 Hrs  T(C): 37 (04 Jun 2025 04:17), Max: 37 (03 Jun 2025 12:53)  T(F): 98.6 (04 Jun 2025 04:17), Max: 98.6 (03 Jun 2025 12:53)  HR: 79 (04 Jun 2025 04:17) (67 - 82)  BP: 145/60 (04 Jun 2025 04:17) (145/60 - 175/68)  BP(mean): --  RR: 18 (04 Jun 2025 04:17) (17 - 18)  SpO2: 95% (04 Jun 2025 04:17) (94% - 97%)    Parameters below as of 04 Jun 2025 04:17  Patient On (Oxygen Delivery Method): room air         Physical Exam:  General: No acute distress.   HEENT: Head normocephalic, atraumatic. Conjunctivae clear w/o exudates or hemorrhage. Sclera non-icteric. Nares are patent bilaterally. Mucous membranes pink and moist.   Cardiac: Normal rate and rhythm.    Respiratory: Lung sounds clear in all fields. Chest wall symmetric, nontender.   Abdominal: Soft, nondistended, nontender. Bowel sounds normoactive x 4 quadrants.    Skin: Skin is warm, dry   Extremities: No edema     Detailed Neurologic Exam:    Mental status: The patient is awake and alert and has normal attention span.  The patient is fully oriented in 3 spheres. The patient is able to name objects, follow commands, repeat sentences.  Cranial nerves: Pupils equal and react symmetrically to light. There is no visual field deficit to confrontation. Extraocular motion is full with no nystagmus. There is no ptosis. Facial sensation is intact. Facial musculature is symmetric. Palate elevates symmetrically. Tongue is midline.  Motor: There is normal bulk and tone.  There is no tremor.  Strength is 5/5 in the right arm and leg.   Strength is 5/5 in the left arm and leg.  Sensation: Intact to light touch in 4 extremities  Cerebellar: There is no dysmetria on finger to nose testing.  Gait :     LABS:                        7.6    7.35  )-----------( 252      ( 04 Jun 2025 04:41 )             25.4    06-04    140  |  106  |  38.6[H]  ----------------------------<  150[H]  4.8   |  20.0[L]  |  2.65[H]    Ca    8.1[L]      04 Jun 2025 04:41  Phos  4.4     06-04  Mg     2.1     06-04    TPro  5.9[L]  /  Alb  2.9[L]  /  TBili  <0.2[L]  /  DBili  x   /  AST  13  /  ALT  6   /  AlkPhos  68  06-03 06-02 Chol 200[H] LDL - 131 - HDL 38[L] Trig 171[H]    A1C: 8.7 % (06.02.25 @ 06:42)     IMAGING: Reviewed by me.    Neuro-Imaging     MR Head No Cont (06.03.25 @ 16:29)   IMPRESSION:  No evidence of a mass or current evidence of acute ischemia. Chronic   small infarcts and white matter ischemic changes.     (06.01.25 @ 09:08)  IMPRESSION:  CT HEAD: No acute intracranial findings.  CT PERFUSION: No focal perfusion deficit.  CT ANGIOGRAPHY NECK: Right carotid stent appears patent. Short-segment   moderatenarrowing proximal left cervical ICA.  CT ANGIOGRAPHY HEAD: Moderate narrowing left cavernous segment ICA.   Severe narrowing right distal petrous/proximal cavernous segment ICA.   Mild to moderate narrowing right paraclinoid segment ICA. Focal moderate   narrowing right proximal P2 segment. Focal near-complete occlusion right   distal P2 segment.  --  ULTRASOUND   US Renal (06.03.25 @ 16:17)   Limited study.  Atrophic kidneys without hydronephrosis, likely reflecting medical renal   disease.  Evaluation of the right and left renal parenchyma is markedly limited due   to shadowing artifact.    US Duplex Venous Lower Ext Complete, Bilateral (06.01.25 @ 15:39)  IMPRESSION:  No evidence of deep venous thrombosis in either lower extremity.  --  CARDIOLOGY   TTE W or WO Ultrasound Enhancing Agent (06.01.25 @ 13:50)  CONCLUSIONS:      1. Left ventricular systolic function is normal with an ejection fraction visually estimated at 65 %. There are no regional wall motion abnormalities seen.   2. Mild left ventricular hypertrophy.   3. There is mild (grade 1) left ventricular diastolic dysfunction, with normal left ventricular filling pressure.   4. Normal right ventricular cavity size.   5. Left atrium is normal in size.   6. There is mild posterior calcification of the mitral valve annulus.   7. Estimatedpulmonary artery systolic pressure is 22 mmHg.   8. Fibrocalcific aortic valve sclerosis without stenosis.   9. The interatrial septum appears intact.  10. No pericardial effusion seen.  --  XRAY  Xray Chest 1 View AP/PA. (06.01.25 @ 09:35)   HEART:difficult to access in this projection.  LUNGS: retrocardiac infiltrate..  BONES: degenerative changes

## 2025-06-04 NOTE — PROGRESS NOTE ADULT - SUBJECTIVE AND OBJECTIVE BOX
SHARLA SANCHES    [id number]    84y      Female    Patient is a 84y old  Female who presents with a chief complaint of TIA (04 Jun 2025 07:27)      INTERVAL HPI/OVERNIGHT EVENTS:    patient is doing ok, denies fever, chills, chest pain, sob, dizziness     Vital Signs Last 24 Hrs  T(C): 37 (04 Jun 2025 08:11), Max: 37 (04 Jun 2025 04:17)  T(F): 98.6 (04 Jun 2025 08:11), Max: 98.6 (04 Jun 2025 04:17)  HR: 77 (04 Jun 2025 08:11) (67 - 81)  BP: 162/64 (04 Jun 2025 09:35) (145/60 - 185/74)  RR: 20 (04 Jun 2025 08:11) (17 - 20)  SpO2: 100% (04 Jun 2025 08:11) (94% - 100%)    Parameters below as of 04 Jun 2025 08:11  Patient On (Oxygen Delivery Method): room air        PHYSICAL EXAM:    GENERAL: Elderly female looking comfortable  HEENT: PERRL, +EOMI  NECK: soft, Supple, No JVD  CHEST/LUNG: Clear to auscultate bilaterally; No wheezing  HEART: S1S2+, Regular rate and rhythm; No murmurs  ABDOMEN: Soft, Nontender, Nondistended; Bowel sounds present  EXTREMITIES:  1+ Peripheral Pulses, No edema  SKIN: No rashes or lesions  NEURO: AAOX3  PSYCH: normal mood    LABS:                        7.6    7.35  )-----------( 252      ( 04 Jun 2025 04:41 )             25.4     06-04    140  |  106  |  38.6[H]  ----------------------------<  150[H]  4.8   |  20.0[L]  |  2.65[H]    Ca    8.1[L]      04 Jun 2025 04:41  Phos  4.4     06-04  Mg     2.1     06-04    TPro  5.9[L]  /  Alb  2.9[L]  /  TBili  <0.2[L]  /  DBili  x   /  AST  13  /  ALT  6   /  AlkPhos  68  06-03        I&O's Summary    04 Jun 2025 07:01  -  04 Jun 2025 13:19  --------------------------------------------------------  IN: 120 mL / OUT: 0 mL / NET: 120 mL        MEDICATIONS  (STANDING):  apixaban 2.5 milliGRAM(s) Oral every 12 hours  ascorbic acid 500 milliGRAM(s) Oral daily  aspirin enteric coated 81 milliGRAM(s) Oral daily  atenolol  Tablet 100 milliGRAM(s) Oral daily  atorvastatin 80 milliGRAM(s) Oral at bedtime  cefTRIAXone Injectable. 1000 milliGRAM(s) IV Push every 24 hours  dextrose 5%. 1000 milliLiter(s) (100 mL/Hr) IV Continuous <Continuous>  dextrose 5%. 1000 milliLiter(s) (50 mL/Hr) IV Continuous <Continuous>  dextrose 50% Injectable 25 Gram(s) IV Push once  dextrose 50% Injectable 12.5 Gram(s) IV Push once  dextrose 50% Injectable 25 Gram(s) IV Push once  ferrous    sulfate 325 milliGRAM(s) Oral every 8 hours  glucagon  Injectable 1 milliGRAM(s) IntraMuscular once  hydrALAZINE 25 milliGRAM(s) Oral three times a day  insulin glargine Injectable (LANTUS) 15 Unit(s) SubCutaneous at bedtime  insulin lispro (ADMELOG) corrective regimen sliding scale   SubCutaneous Before meals and at bedtime  insulin lispro Injectable (ADMELOG) 3 Unit(s) SubCutaneous three times a day before meals  senna 2 Tablet(s) Oral at bedtime  sodium chloride 0.9%. 1000 milliLiter(s) (75 mL/Hr) IV Continuous <Continuous>    MEDICATIONS  (PRN):  acetaminophen     Tablet .. 650 milliGRAM(s) Oral every 6 hours PRN Temp greater or equal to 38C (100.4F), Moderate Pain (4 - 6)  dextrose Oral Gel 15 Gram(s) Oral once PRN Blood Glucose LESS THAN 70 milliGRAM(s)/deciliter  hydrALAZINE Injectable 10 milliGRAM(s) IV Push every 4 hours   labetalol Injectable 10 milliGRAM(s) IV Push every 4 hours PRN SBP > 190  polyethylene glycol 3350 17 Gram(s) Oral at bedtime PRN Constipation

## 2025-06-04 NOTE — CONSULT NOTE ADULT - SUBJECTIVE AND OBJECTIVE BOX
Patient is a 84y old  Female who presents with a chief complaint of TIA (04 Jun 2025 13:19)      HPI:                               [facility] Stroke Attending Note  CC: slurred speech.   HPI:  The patient is a 83 y/o F with hx of prior stroke in 2022, hx of Right carotid stent > 5 years for asymptomatic carotid disease, PE in 2022 s/p thrombectomy post-covid vaccine, on Eliquis 2.5mg BID and ASA, DM, CKD, Anemia, HLD who presents for transient episode of slurred speech and R arm weakness. Last well known last evening, woke up this AM at 7:30AM and noted that she had difficulty raising her right arm and pulling herself up.  No other focal weakness. Her children also noted she was slurring her words. Upon arrival here, no more symptoms.      Denies any prior episode.  Admits to noncompliance with Eliquis and misses nightly dose regularly.  Follows with hematology/pulm/nephro.  Has known anemia and CKD.     Interim noted   Baseline creat @ 1.8   Renal sonogram noted   Getting blood transfusion today   No SOB or CP  Presenting SSX resolved   BP trending better     Stroke risk factors:    PAST MEDICAL & SURGICAL HISTORY:  HTN (hypertension)      HLD (hyperlipidemia)      DM (diabetes mellitus)          MEDICATIONS  (STANDING):  apixaban 2.5 milliGRAM(s) Oral every 12 hours  aspirin enteric coated 81 milliGRAM(s) Oral daily  atorvastatin 80 milliGRAM(s) Oral at bedtime  senna 2 Tablet(s) Oral at bedtime    MEDICATIONS  (PRN):  acetaminophen     Tablet .. 650 milliGRAM(s) Oral every 6 hours PRN Temp greater or equal to 38C (100.4F), Moderate Pain (4 - 6)  polyethylene glycol 3350 17 Gram(s) Oral at bedtime PRN Constipation      Allergies    No Known Allergies    Intolerances        SOCIAL HISTORY:  no tob,   no alcohol   no drugs    Vital Signs Last 24 Hrs  T(C): 36.6 (01 Jun 2025 11:06), Max: 36.7 (01 Jun 2025 08:55)  T(F): 97.8 (01 Jun 2025 11:06), Max: 98.1 (01 Jun 2025 08:55)  HR: 74 (01 Jun 2025 11:06) (74 - 79)  BP: 142/82 (01 Jun 2025 11:06) (142/82 - 147/62)  BP(mean): --  RR: 20 (01 Jun 2025 11:06) (18 - 20)  SpO2: 100% (01 Jun 2025 11:06) (98% - 100%)        RADIOLOGY & ADDITIONAL STUDIES (independently reviewed unless otherwise noted):  < from: CT Angio Neck Stroke Protocol w/ IV Cont (06.01.25 @ 09:26) >    CT HEAD: No acute intracranial findings.    Findings discussed with Dr. Galaviz of the patient's clinical team on   6/1/2025 at 9:11 AM.    CT PERFUSION: No focal perfusion deficit.    CT ANGIOGRAPHY NECK: Right carotid stent appears patent. Short-segment   moderatenarrowing proximal left cervical ICA.    CT ANGIOGRAPHY HEAD: Moderate narrowing left cavernous segment ICA.   Severe narrowing right distal petrous/proximal cavernous segment ICA.   Mild to moderate narrowing right paraclinoid segment ICA. Focal moderate   narrowing right proximal P2 segment. Focal near-complete occlusion right   distal P2 segment.    < end of copied text >   (01 Jun 2025 13:10)      PAST MEDICAL & SURGICAL HISTORY:  HTN (hypertension)      HLD (hyperlipidemia)      DM (diabetes mellitus)          FAMILY HISTORY:      Social History:    MEDICATIONS  (STANDING):  apixaban 2.5 milliGRAM(s) Oral every 12 hours  ascorbic acid 500 milliGRAM(s) Oral daily  aspirin enteric coated 81 milliGRAM(s) Oral daily  atenolol  Tablet 100 milliGRAM(s) Oral daily  atorvastatin 80 milliGRAM(s) Oral at bedtime  cefTRIAXone Injectable. 1000 milliGRAM(s) IV Push every 24 hours  dextrose 5%. 1000 milliLiter(s) (100 mL/Hr) IV Continuous <Continuous>  dextrose 5%. 1000 milliLiter(s) (50 mL/Hr) IV Continuous <Continuous>  dextrose 50% Injectable 25 Gram(s) IV Push once  dextrose 50% Injectable 12.5 Gram(s) IV Push once  dextrose 50% Injectable 25 Gram(s) IV Push once  ferrous    sulfate 325 milliGRAM(s) Oral every 8 hours  glucagon  Injectable 1 milliGRAM(s) IntraMuscular once  hydrALAZINE 25 milliGRAM(s) Oral three times a day  insulin glargine Injectable (LANTUS) 15 Unit(s) SubCutaneous at bedtime  insulin lispro (ADMELOG) corrective regimen sliding scale   SubCutaneous Before meals and at bedtime  insulin lispro Injectable (ADMELOG) 3 Unit(s) SubCutaneous three times a day before meals  senna 2 Tablet(s) Oral at bedtime  sodium chloride 0.9%. 1000 milliLiter(s) (75 mL/Hr) IV Continuous <Continuous>    MEDICATIONS  (PRN):  acetaminophen     Tablet .. 650 milliGRAM(s) Oral every 6 hours PRN Temp greater or equal to 38C (100.4F), Moderate Pain (4 - 6)  dextrose Oral Gel 15 Gram(s) Oral once PRN Blood Glucose LESS THAN 70 milliGRAM(s)/deciliter  hydrALAZINE Injectable 10 milliGRAM(s) IV Push every 4 hours   labetalol Injectable 10 milliGRAM(s) IV Push every 4 hours PRN SBP > 190  polyethylene glycol 3350 17 Gram(s) Oral at bedtime PRN Constipation      Allergies    No Known Allergies    Intolerances        Vital Signs Last 24 Hrs  T(C): 36.7 (04 Jun 2025 13:51), Max: 37 (04 Jun 2025 04:17)  T(F): 98.1 (04 Jun 2025 13:51), Max: 98.6 (04 Jun 2025 04:17)  HR: 75 (04 Jun 2025 13:51) (67 - 81)  BP: 151/74 (04 Jun 2025 13:51) (145/60 - 185/74)  BP(mean): --  RR: 19 (04 Jun 2025 13:51) (17 - 20)  SpO2: 97% (04 Jun 2025 13:51) (94% - 100%)    Parameters below as of 04 Jun 2025 13:51  Patient On (Oxygen Delivery Method): room air      Daily     Daily   I&O's Detail    04 Jun 2025 07:01  -  04 Jun 2025 16:33  --------------------------------------------------------  IN:    Oral Fluid: 120 mL  Total IN: 120 mL    OUT:  Total OUT: 0 mL    Total NET: 120 mL        I&O's Summary    04 Jun 2025 07:01  -  04 Jun 2025 16:33  --------------------------------------------------------  IN: 120 mL / OUT: 0 mL / NET: 120 mL        PHYSICAL EXAM:  GENERAL: NAD,   HEAD:  Atraumatic, Normocephalic  NECK: Supple, No JVD,   NERVOUS SYSTEM:  Alert & Oriented X3,   CHEST/LUNG: Clear / EAE . No wheeze   HEART: Regular rate and rhythm; No gallop or rub   ABDOMEN: Soft, Nontender, Nondistended; Bowel sounds present  EXTREMITIES:  no edema       LABS:                        7.6    7.35  )-----------( 252      ( 04 Jun 2025 04:41 )             25.4     06-04    140  |  106  |  38.6[H]  ----------------------------<  150[H]  4.8   |  20.0[L]  |  2.65[H]    Ca    8.1[L]      04 Jun 2025 04:41  Phos  4.4     06-04  Mg     2.1     06-04    TPro  5.9[L]  /  Alb  2.9[L]  /  TBili  <0.2[L]  /  DBili  x   /  AST  13  /  ALT  6   /  AlkPhos  68  06-03      Urinalysis Basic - ( 04 Jun 2025 04:41 )    Color: x / Appearance: x / SG: x / pH: x  Gluc: 150 mg/dL / Ketone: x  / Bili: x / Urobili: x   Blood: x / Protein: x / Nitrite: x   Leuk Esterase: x / RBC: x / WBC x   Sq Epi: x / Non Sq Epi: x / Bacteria: x      Magnesium: 2.1 mg/dL (06-04 @ 04:41)  Phosphorus: 4.4 mg/dL (06-04 @ 04:41)        < from: MR Head No Cont (06.03.25 @ 16:29) >    ACC: [id number] EXAM:  MR BRAIN   ORDERED BY: ILIANA WARNER     PROCEDURE DATE:  06/03/2025          INTERPRETATION:  MR BRAIN    Clinical information: Concern for Stroke. Transient slurred speech and   right arm weakness.    A noncontrast MRI of the brain was performed.    TECHNIQUE:  In the sagittal plane a T1 weighted sequence was performed.  In the axial   plane T1, T2, FLAIR, SWI, and diffusion weighted imaging was obtained.    COMPARISON:  Exam is compared to head CT of 6/1/2025    FINDINGS:  BRAIN PARENCHYMA:  Mild to moderate atrophy. No current evidence of diffusion restriction to   suggest acute ischemia.  Chronic small left cerebellar infarct. Chronic small right parietal   infarct. Mild to moderate chronic periventricular and subcortical white   matter microvascular ischemic changes.  No parenchymal mass or mass effect. No intracranial blood products.  Corpus callosum well formed. No cerebellar tonsillar ectopia.    VENTRICLES:  There is no hydrocephalus.   There is no midline shift.    EXTRA-AXIAL:  No extra-axial mass.  No evidence of a subdural or epidural collection.    Patent basal cisterns.  Prior bilateral lens replacement.    OTHER:  No air-fluid levels within the paranasal sinuses.    IMPRESSION:  No evidence of a mass or current evidence of acute ischemia. Chronic   small infarcts and white matter ischemic changes.    --- End of Report ---            JESSIKA DAVIS MD; Attending Radiologist  This document has been electronically signed. Hernan  3 2025  4:46PM    < end of copied text >    RADIOLOGY & ADDITIONAL TESTS:  < from: US Renal (06.03.25 @ 16:17) >    ACC: [id number] EXAM:  US KIDNEY(S)   ORDERED BY: KELLEY HUFFMAN     PROCEDURE DATE:  06/03/2025          INTERPRETATION:  CLINICAL INFORMATION: Renal failure    COMPARISON: None available.    TECHNIQUE: Sonography of the kidneys and bladder.    FINDINGS:    Bilateral atrophic echogenic kidneys, poorly evaluated due to shadowing   artifact.    No hydronephrosis of either kidney. Right kidney measures approximately   8.9 cm. Left kidney measures approximately 8.5 cm.    Few cysts of the right kidneyare identified, measuring 1.0 x 0.9 x 0.7   cm at the mid pole and 3.2 x 2.2 x 2.5 cm at the lower pole. Overall,   shadowing artifact limits evaluation of the right and left renal   parenchyma.    Urinary bladder is grossly unremarkable on grayscaleimages.    IMPRESSION:    Limited study.    Atrophic kidneys without hydronephrosis, likely reflecting medical renal   disease.    Evaluation of the right and left renal parenchyma is markedly limited due   to shadowing artifact.    --- End of Report---            ELIZABETH SILVA M.D., ATTENDING RADIOLOGIST  This document has been electronically signed. Hernan  3 2025  5:12PM    < end of copied text >

## 2025-06-04 NOTE — PROGRESS NOTE ADULT - ASSESSMENT
83 y/o F with hx of prior stroke in 2022, hx of Right carotid stent > 5 years for asymptomatic carotid disease, PE in 2022 s/p thrombectomy post-covid vaccine, on Eliquis 2.5mg BID and ASA, DM, CKD, Anemia, HLD who presents for transient episode of slurred speech and R arm weakness, at 7:30AM and noted that she had difficulty raising her right arm and pulling herself up.  No other focal weakness. Her children also noted she was slurring her words. Upon arrival here, no more symptoms, denies any prior episode.  Admits to noncompliance with Eliquis and misses nightly dose regularly.  Follows with hematology/pulm/nephro.  Has known anemia and CKD, she had Imaging done in the ED showed CT HEAD: No acute intracranial findings, CT PERFUSION: No focal perfusion deficit, CT ANGIOGRAPHY NECK: Right carotid stent appears patent. Short-segment moderate narrowing proximal left cervical ICA.  CT ANGIOGRAPHY HEAD: Moderate narrowing left cavernous segment ICA. Severe narrowing right distal petrous/proximal cavernous segment ICA. Mild to moderate narrowing right paraclinoid segment ICA. Focal moderate narrowing right proximal P2 segment. Focal near-complete occlusion right distal P2 segment, patient is admitted under stroke team, Medicine consulted for Medical Management    Plan:      TIA in setting of previous Hx of stroke:     -Continue close monitoring for neurologic deterioration    -Stroke neuro checks q 4 hours    Continues Eliquis 2.5mg BID and ASA 81mg daily   - started on atorvastatin 80 mg daily  -MRI Brain w/o to be done   -Possible Neuro IR consultation pending MRI results                                                                                                                                                                                                                                                                                                                                   -Dysphagia screen: passed   -Physical therapy/OT/Speech eval/treatment.     JENNIFER on CKD: creatinine is going up, today is 2.65, will continue to hold HCTZ, will avoid Nephrotoxic agents, will monitor I/O and daily weight, renal US done shows medical renal disease, since creatinine is worsening, will get Nephrology consult     Hx of HTN: would resume her atenolol 100mg daily, added Hydralazine 25mg Q8h, holding hydrochlorothiazide 25 mg once a day with holding parameters, will give PRN meds if BP is high     Hx of DM: FS monitoring and coverage insulin, will hold Glipizide and Metformin while here in the hospital, Hb a1c is 8.7, has been started on Lantus 12 units at night, will increase to 15 units tonight and will add Prandial insulin 3 units with meals.      Hx of HLD: will continue with atorvastatin 80 mg once a day (at bedtime).     Hx of GERD: Will continue with Protonix 40 mg  once a day.     Anemia seems like chronic with some drop:  patient hb in december was ~9, now 7.5   type and screen done, going to get an unit of PRBCs given she has tiredness working with PT as well as hx of TIA and stroke with low Hb    anemia workup done with normal Iron and TIBC  will continue with Iron and vitamin C  might need Erythropoietin given hx of CKD.     Plan of care discussed with  Stroke team     Patient is being is accepted under Medicine for further care       40 minutes spent on total encounter. The necessity of the time spent during the encounter on this date of service was due to:     Time spent reviewing the chart documentation, reviewing labs and imaging studies, evaluating the patient, discussing the plan of care with the consultants & medical team, and documenting.

## 2025-06-04 NOTE — PROGRESS NOTE ADULT - ASSESSMENT
85 y/o F with hx of prior stroke in 2022, hx of Right carotid stent > 5 years for asymptomatic carotid disease, PE in 2022 s/p thrombectomy post-covid vaccine, on Eliquis 2.5mg BID and ASA who presents for transient episode of slurred speech and R arm weakness. CT head without acute infarction or hemorrhage. CT ngiogram head/neck shows patent Rt ICA stent, moderate cervical left ICA stenosis, moderate left cavernous ICA stenosis, severe distal right cavernous segment ICA stenosis, mild-moderate right paraclinoid ICA stenosis, focal right proximal P2 segment stenosis with focal to near complete occlusion of the right distal P2 segment.    MR brain without acute infarction or hemorrhage.     Impression: Transient right hemiparesis and dysarthria, concern for left hemispheric dysfunction. Concern for TIA.  Multifocal extra and intracranial atherosclerotic disease with potential underlying hypercoagulable state given history as noted and reported non adherence with apixaban, anemia, and JENNIFER on CKD with potential encephalopathy.      NEURO: TIA  -Neurologically without acute change.   -Continue close monitoring for neurologic deterioration    -Stroke neuro checks q 4 hours    -SBP currently 150-190s mmHg, gradual titration 150-170mmHg for now , over the next 1 week goal of 140-160mmHg with eventual long term age/risk specific relative normotension as tolerated avoiding rapid fluctuations.   -ANTITHROMBOTIC THERAPY: Continues Apixaban 2.5mg BID per home regimen (reported non adhearance- encouraged usage as prescribed) and ASA 81mg daily .   - - started on atorvastatin 80 mg daily  -MRI Brain w/o as noted   -Close neurovascular / Neuro IR follow up for noted atherosclerotic disease.   -Dysphagia screen: passed   -Physical therapy/OT/Speech eval/treatment.   -Stroke education     CARDIOVASCULAR: HTN, HLD  -TTE: as above  -Continuous cardiac monitoring for arrhythmias   -Blood pressure and antiplatelet/anticoagulation plan as above               Heme: Anemia of chronic disease, plt 252  - follow Hb, acute on chronic anemia   -Transfuse accordingly   - outpatient f/u with hematology or sooner if clinically indicated  -No active bleeding noted   -No evidence of DVT in either extremity on LE venous duplex     ENDO: DM with hyperglycemia  - HbA1c: 8.7  - continue regular fingersticks   - continue insulin sliding scale   - monitor glucose levels   - f/u outpatient with endocrinologist (pt reports improved a1c from 11)    PULMONARY:   - protecting airway, saturating well   -Encourage mobility and incentive spirometry as tolerated     RENAL: JENNIFER on CKD IV  - Gentle hydration  - Renal US as noted   -Nephrology follow up   - monitor urine output, maintain adequate hydration    - Na Goal:  135-145  - monitor electrolytes , supplement accordingly.   - Brooke: n/a    ID:   - Afebrile, monitor for si/sx of infection   - monitor temperature curve   - UA , positive LE, TNTC bacteria, cloudy started on Rocephin  -Follow up UCx and sensitivities , cx - reordered     GI/Nutrition:   - continue bowel regimen , pt noted no BM, deferred regimen at this time. Discussed bowel regimen, pt to reconsider taking if no BM today.   -GI ppx as needed     Skin/Musk:  - recurrent evaluations for evidence of skin breakdown     OTHER:  condition and plan of care d/w patient, daughter via telephone as requested, questions and concerns addressed.     DISPOSITION: TABTAHA per PT eval once stable and workup is complete. Medical team following for further recommendations.     CORE MEASURES:        Admission NIHSS: 0     Tenecteplase : [] YES [x] NO      LDL/HDL/A1C: 131/38/8.7     Depression Screen- if depression hx and/or present      Statin Therapy: Y     Dysphagia Screen: [x] PASS [] FAIL     Smoking [] YES [x] NO      Afib [] YES [x] NO     Stroke Education [x] YES [] NO      Obtain screening lower extremity venous ultrasound in patients who meet 1 or more of the following criteria as patient is high risk for DVT/PE on admission:   [] History of DVT/PE  []Hypercoagulable states (Factor V Leiden, Cancer, OCP, etc. )  []Prolonged immobility (hemiplegia/hemiparesis/post operative or any other extended immobilization)  [] Transferred from outside facility (Rehab or Long term care)  [] Age </= to 50  [x] stroke    85 y/o F with hx of prior stroke in 2022, hx of Right carotid stent > 5 years for asymptomatic carotid disease, PE in 2022 s/p thrombectomy post-covid vaccine, on Eliquis 2.5mg BID and ASA who presents for transient episode of slurred speech and R arm weakness. CT head without acute infarction or hemorrhage. CT ngiogram head/neck shows patent Rt ICA stent, moderate cervical left ICA stenosis, moderate left cavernous ICA stenosis, severe distal right cavernous segment ICA stenosis, mild-moderate right paraclinoid ICA stenosis, focal right proximal P2 segment stenosis with focal to near complete occlusion of the right distal P2 segment.    MR brain without acute infarction or hemorrhage.     Impression: Transient right hemiparesis and dysarthria, concern for left hemispheric TIA. Likely secondary to multifocal extra and intracranial atherosclerotic disease with potential underlying hypercoagulable state given history as noted and reported non adherence with apixaban, anemia, and JENNIFER on CKD with potential encephalopathy.      At this time, would not pursue any carotid intervention and would optimize her medically.     NEURO: TIA  -Neurologically without acute change.   -Continue close monitoring for neurologic deterioration    -Stroke neuro checks q 4 hours    -SBP currently 150-190s mmHg, gradual titration 150-170mmHg for now , over the next 1 week goal of 140-160mmHg with eventual long term age/risk specific relative normotension as tolerated avoiding rapid fluctuations.   -ANTITHROMBOTIC THERAPY: Continues Apixaban 2.5mg BID per home regimen (reported non adhearance- encouraged usage as prescribed) and ASA 81mg daily .   - - started on atorvastatin 80 mg daily  -MRI Brain w/o as noted   -Close neurovascular / Neuro IR follow up for noted atherosclerotic disease.   -Dysphagia screen: passed   -Physical therapy/OT/Speech eval/treatment.   -Stroke education     CARDIOVASCULAR: HTN, HLD  -TTE: as above  -Continuous cardiac monitoring for arrhythmias   -Blood pressure and antiplatelet/anticoagulation plan as above               Heme: Anemia of chronic disease, plt 252  - follow Hb, acute on chronic anemia   -Transfuse accordingly   - outpatient f/u with hematology or sooner if clinically indicated  -No active bleeding noted   -No evidence of DVT in either extremity on LE venous duplex     ENDO: DM with hyperglycemia  - HbA1c: 8.7  - continue regular fingersticks   - continue insulin sliding scale   - monitor glucose levels   - f/u outpatient with endocrinologist (pt reports improved a1c from 11)    PULMONARY:   - protecting airway, saturating well   -Encourage mobility and incentive spirometry as tolerated     RENAL: JENNIFER on CKD IV  - Gentle hydration  - Renal US as noted   -Nephrology follow up   - monitor urine output, maintain adequate hydration    - Na Goal:  135-145  - monitor electrolytes , supplement accordingly.   - Brooke: n/a    ID:   - Afebrile, monitor for si/sx of infection   - monitor temperature curve   - UA , positive LE, TNTC bacteria, cloudy started on Rocephin  -Follow up UCx and sensitivities , cx - reordered     GI/Nutrition:   - continue bowel regimen , pt noted no BM, deferred regimen at this time. Discussed bowel regimen, pt to reconsider taking if no BM today.   -GI ppx as needed     Skin/Musk:  - recurrent evaluations for evidence of skin breakdown     OTHER:  condition and plan of care d/w patient, daughter via telephone as requested, questions and concerns addressed.     DISPOSITION: TABATHA per PT eval once stable and workup is complete. Medical team following for further recommendations.     CORE MEASURES:        Admission NIHSS: 0     Tenecteplase : [] YES [x] NO      LDL/HDL/A1C: 131/38/8.7     Depression Screen- if depression hx and/or present      Statin Therapy: Y     Dysphagia Screen: [x] PASS [] FAIL     Smoking [] YES [x] NO      Afib [] YES [x] NO     Stroke Education [x] YES [] NO      Obtain screening lower extremity venous ultrasound in patients who meet 1 or more of the following criteria as patient is high risk for DVT/PE on admission:   [] History of DVT/PE  []Hypercoagulable states (Factor V Leiden, Cancer, OCP, etc. )  []Prolonged immobility (hemiplegia/hemiparesis/post operative or any other extended immobilization)  [] Transferred from outside facility (Rehab or Long term care)  [] Age </= to 50  [x] stroke

## 2025-06-05 LAB
ANION GAP SERPL CALC-SCNC: 12 MMOL/L — SIGNIFICANT CHANGE UP (ref 5–17)
BUN SERPL-MCNC: 39.2 MG/DL — HIGH (ref 8–20)
CALCIUM SERPL-MCNC: 8 MG/DL — LOW (ref 8.4–10.5)
CHLORIDE SERPL-SCNC: 108 MMOL/L — SIGNIFICANT CHANGE UP (ref 96–108)
CO2 SERPL-SCNC: 19 MMOL/L — LOW (ref 22–29)
CREAT SERPL-MCNC: 2.69 MG/DL — HIGH (ref 0.5–1.3)
EGFR: 17 ML/MIN/1.73M2 — LOW
EGFR: 17 ML/MIN/1.73M2 — LOW
FERRITIN SERPL-MCNC: 48 NG/ML — SIGNIFICANT CHANGE UP (ref 13–330)
GLUCOSE BLDC GLUCOMTR-MCNC: 167 MG/DL — HIGH (ref 70–99)
GLUCOSE BLDC GLUCOMTR-MCNC: 167 MG/DL — HIGH (ref 70–99)
GLUCOSE BLDC GLUCOMTR-MCNC: 171 MG/DL — HIGH (ref 70–99)
GLUCOSE BLDC GLUCOMTR-MCNC: 182 MG/DL — HIGH (ref 70–99)
GLUCOSE BLDC GLUCOMTR-MCNC: 185 MG/DL — HIGH (ref 70–99)
GLUCOSE SERPL-MCNC: 153 MG/DL — HIGH (ref 70–99)
HCT VFR BLD CALC: 28.5 % — LOW (ref 34.5–45)
HGB BLD-MCNC: 8.6 G/DL — LOW (ref 11.5–15.5)
IRON SATN MFR SERPL: 28 % — SIGNIFICANT CHANGE UP (ref 14–50)
IRON SATN MFR SERPL: 81 UG/DL — SIGNIFICANT CHANGE UP (ref 37–145)
MAGNESIUM SERPL-MCNC: 2.2 MG/DL — SIGNIFICANT CHANGE UP (ref 1.6–2.6)
MCHC RBC-ENTMCNC: 24.3 PG — LOW (ref 27–34)
MCHC RBC-ENTMCNC: 30.2 G/DL — LOW (ref 32–36)
MCV RBC AUTO: 80.5 FL — SIGNIFICANT CHANGE UP (ref 80–100)
NRBC # BLD AUTO: 0 K/UL — SIGNIFICANT CHANGE UP (ref 0–0)
NRBC # FLD: 0 K/UL — SIGNIFICANT CHANGE UP (ref 0–0)
NRBC BLD AUTO-RTO: 0 /100 WBCS — SIGNIFICANT CHANGE UP (ref 0–0)
PLATELET # BLD AUTO: 222 K/UL — SIGNIFICANT CHANGE UP (ref 150–400)
PMV BLD: 10.6 FL — SIGNIFICANT CHANGE UP (ref 7–13)
POTASSIUM SERPL-MCNC: 5 MMOL/L — SIGNIFICANT CHANGE UP (ref 3.5–5.3)
POTASSIUM SERPL-SCNC: 5 MMOL/L — SIGNIFICANT CHANGE UP (ref 3.5–5.3)
RBC # BLD: 3.54 M/UL — LOW (ref 3.8–5.2)
RBC # FLD: 17.6 % — HIGH (ref 10.3–14.5)
SODIUM SERPL-SCNC: 139 MMOL/L — SIGNIFICANT CHANGE UP (ref 135–145)
TIBC SERPL-MCNC: 290 UG/DL — SIGNIFICANT CHANGE UP (ref 220–430)
TRANSFERRIN SERPL-MCNC: 203 MG/DL — SIGNIFICANT CHANGE UP (ref 192–382)
WBC # BLD: 8.53 K/UL — SIGNIFICANT CHANGE UP (ref 3.8–10.5)
WBC # FLD AUTO: 8.53 K/UL — SIGNIFICANT CHANGE UP (ref 3.8–10.5)

## 2025-06-05 PROCEDURE — 99233 SBSQ HOSP IP/OBS HIGH 50: CPT

## 2025-06-05 PROCEDURE — 93880 EXTRACRANIAL BILAT STUDY: CPT | Mod: 26

## 2025-06-05 RX ORDER — ONDANSETRON HCL/PF 4 MG/2 ML
4 VIAL (ML) INJECTION EVERY 6 HOURS
Refills: 0 | Status: DISCONTINUED | OUTPATIENT
Start: 2025-06-05 | End: 2025-06-10

## 2025-06-05 RX ADMIN — Medication 81 MILLIGRAM(S): at 11:34

## 2025-06-05 RX ADMIN — INSULIN LISPRO 3 UNIT(S): 100 INJECTION, SOLUTION INTRAVENOUS; SUBCUTANEOUS at 17:48

## 2025-06-05 RX ADMIN — Medication 500 MILLIGRAM(S): at 11:34

## 2025-06-05 RX ADMIN — Medication 325 MILLIGRAM(S): at 06:02

## 2025-06-05 RX ADMIN — INSULIN LISPRO 3 UNIT(S): 100 INJECTION, SOLUTION INTRAVENOUS; SUBCUTANEOUS at 14:09

## 2025-06-05 RX ADMIN — IRON SUCROSE 210 MILLIGRAM(S): 20 INJECTION, SOLUTION INTRAVENOUS at 06:03

## 2025-06-05 RX ADMIN — LISINOPRIL 100 MILLIGRAM(S): 30 TABLET ORAL at 06:03

## 2025-06-05 RX ADMIN — INSULIN LISPRO 2: 100 INJECTION, SOLUTION INTRAVENOUS; SUBCUTANEOUS at 17:48

## 2025-06-05 RX ADMIN — Medication 50 MILLIGRAM(S): at 15:01

## 2025-06-05 RX ADMIN — INSULIN LISPRO 2: 100 INJECTION, SOLUTION INTRAVENOUS; SUBCUTANEOUS at 08:56

## 2025-06-05 RX ADMIN — Medication 650 MILLIGRAM(S): at 17:47

## 2025-06-05 RX ADMIN — ATORVASTATIN CALCIUM 80 MILLIGRAM(S): 80 TABLET, FILM COATED ORAL at 21:51

## 2025-06-05 RX ADMIN — INSULIN GLARGINE-YFGN 15 UNIT(S): 100 INJECTION, SOLUTION SUBCUTANEOUS at 21:49

## 2025-06-05 RX ADMIN — APIXABAN 2.5 MILLIGRAM(S): 2.5 TABLET, FILM COATED ORAL at 06:02

## 2025-06-05 RX ADMIN — INSULIN LISPRO 3 UNIT(S): 100 INJECTION, SOLUTION INTRAVENOUS; SUBCUTANEOUS at 08:56

## 2025-06-05 RX ADMIN — Medication 25 MILLIGRAM(S): at 06:03

## 2025-06-05 RX ADMIN — Medication 325 MILLIGRAM(S): at 15:01

## 2025-06-05 RX ADMIN — Medication 50 MILLIGRAM(S): at 21:51

## 2025-06-05 RX ADMIN — LABETALOL HYDROCHLORIDE 10 MILLIGRAM(S): 200 TABLET, FILM COATED ORAL at 00:22

## 2025-06-05 RX ADMIN — INSULIN LISPRO 2: 100 INJECTION, SOLUTION INTRAVENOUS; SUBCUTANEOUS at 21:49

## 2025-06-05 RX ADMIN — APIXABAN 2.5 MILLIGRAM(S): 2.5 TABLET, FILM COATED ORAL at 17:48

## 2025-06-05 RX ADMIN — Medication 325 MILLIGRAM(S): at 21:50

## 2025-06-05 RX ADMIN — INSULIN LISPRO 2: 100 INJECTION, SOLUTION INTRAVENOUS; SUBCUTANEOUS at 14:09

## 2025-06-05 RX ADMIN — Medication 650 MILLIGRAM(S): at 06:03

## 2025-06-05 NOTE — PROGRESS NOTE ADULT - SUBJECTIVE AND OBJECTIVE BOX
NEPHROLOGY INTERVAL HPI/OVERNIGHT EVENTS:    Feels better   Meds noted   OOB to chair     MEDICATIONS  (STANDING):  apixaban 2.5 milliGRAM(s) Oral every 12 hours  ascorbic acid 500 milliGRAM(s) Oral daily  aspirin enteric coated 81 milliGRAM(s) Oral daily  atenolol  Tablet 100 milliGRAM(s) Oral daily  atorvastatin 80 milliGRAM(s) Oral at bedtime  dextrose 5%. 1000 milliLiter(s) (50 mL/Hr) IV Continuous <Continuous>  dextrose 5%. 1000 milliLiter(s) (100 mL/Hr) IV Continuous <Continuous>  dextrose 50% Injectable 25 Gram(s) IV Push once  dextrose 50% Injectable 12.5 Gram(s) IV Push once  dextrose 50% Injectable 25 Gram(s) IV Push once  ferrous    sulfate 325 milliGRAM(s) Oral every 8 hours  glucagon  Injectable 1 milliGRAM(s) IntraMuscular once  hydrALAZINE 50 milliGRAM(s) Oral every 8 hours  insulin glargine Injectable (LANTUS) 15 Unit(s) SubCutaneous at bedtime  insulin lispro (ADMELOG) corrective regimen sliding scale   SubCutaneous Before meals and at bedtime  insulin lispro Injectable (ADMELOG) 3 Unit(s) SubCutaneous three times a day before meals  iron sucrose IVPB 100 milliGRAM(s) IV Intermittent every 24 hours  senna 2 Tablet(s) Oral at bedtime  sodium bicarbonate 650 milliGRAM(s) Oral two times a day    MEDICATIONS  (PRN):  acetaminophen     Tablet .. 650 milliGRAM(s) Oral every 6 hours PRN Temp greater or equal to 38C (100.4F), Moderate Pain (4 - 6)  dextrose Oral Gel 15 Gram(s) Oral once PRN Blood Glucose LESS THAN 70 milliGRAM(s)/deciliter  hydrALAZINE Injectable 10 milliGRAM(s) IV Push every 4 hours   labetalol Injectable 10 milliGRAM(s) IV Push every 4 hours PRN SBP > 190  ondansetron Injectable 4 milliGRAM(s) IV Push every 6 hours PRN Nausea and/or Vomiting  polyethylene glycol 3350 17 Gram(s) Oral at bedtime PRN Constipation      Allergies    No Known Allergies    Intolerances    Vital Signs Last 24 Hrs  T(C): 36.9 (05 Jun 2025 11:42), Max: 37.1 (05 Jun 2025 10:15)  T(F): 98.4 (05 Jun 2025 11:42), Max: 98.7 (05 Jun 2025 10:15)  HR: 73 (05 Jun 2025 11:42) (69 - 79)  BP: 157/69 (05 Jun 2025 11:42) (135/71 - 190/61)  BP(mean): --  RR: 18 (05 Jun 2025 11:42) (17 - 18)  SpO2: 98% (05 Jun 2025 11:42) (96% - 99%)    Parameters below as of 05 Jun 2025 11:42  Patient On (Oxygen Delivery Method): room air      Daily     Daily   I&O's Detail    04 Jun 2025 07:01  -  05 Jun 2025 07:00  --------------------------------------------------------  IN:    Oral Fluid: 120 mL  Total IN: 120 mL    OUT:    Voided (mL): 1250 mL  Total OUT: 1250 mL    Total NET: -1130 mL      05 Jun 2025 07:01  -  05 Jun 2025 14:15  --------------------------------------------------------  IN:    Oral Fluid: 222 mL  Total IN: 222 mL    OUT:  Total OUT: 0 mL    Total NET: 222 mL        I&O's Summary    04 Jun 2025 07:01  -  05 Jun 2025 07:00  --------------------------------------------------------  IN: 120 mL / OUT: 1250 mL / NET: -1130 mL    05 Jun 2025 07:01  -  05 Jun 2025 14:15  --------------------------------------------------------  IN: 222 mL / OUT: 0 mL / NET: 222 mL        PHYSICAL EXAM:  GENERAL: NAD,   HEAD:  Atraumatic, Normocephalic  NECK: Supple, No JVD,   NERVOUS SYSTEM:  Alert & Oriented X3,   CHEST/LUNG: Clear / EAE . No wheeze   HEART: Regular rate and rhythm; No gallop or rub   ABDOMEN: Soft, Nontender, Nondistended; Bowel sounds present  EXTREMITIES:  no edema       LABS:                        8.6    8.53  )-----------( 222      ( 05 Jun 2025 05:39 )             28.5     06-05    139  |  108  |  39.2[H]  ----------------------------<  153[H]  5.0   |  19.0[L]  |  2.69[H]    Ca    8.0[L]      05 Jun 2025 05:39  Phos  4.4     06-04  Mg     2.2     06-05        Urinalysis Basic - ( 05 Jun 2025 05:39 )    Color: x / Appearance: x / SG: x / pH: x  Gluc: 153 mg/dL / Ketone: x  / Bili: x / Urobili: x   Blood: x / Protein: x / Nitrite: x   Leuk Esterase: x / RBC: x / WBC x   Sq Epi: x / Non Sq Epi: x / Bacteria: x      Magnesium: 2.2 mg/dL (06-05 @ 05:39)          RADIOLOGY & ADDITIONAL TESTS:

## 2025-06-05 NOTE — PROGRESS NOTE ADULT - ASSESSMENT
JENNIFER on CKD III - Prior creat 1.8 --> Improved today   Metformin and Enalapril held   No hydro noted on UDS 6/3/25   S/P CTA 6/1/25   Suspect episode RCN / ATN   Watch for now   Follow FENA     HTN - Better w/ med adjustments     MA - Added oral Bicarb bid     Anemia -  transfusion 6/4  Checked iron stores --->  Added  Venofer x 3   B 12 and folate OK   If BP stable tomorrow , will add Retacrit

## 2025-06-05 NOTE — PROGRESS NOTE ADULT - ASSESSMENT
85 y/o F with hx of prior stroke in 2022, hx of Right carotid stent > 5 years for asymptomatic carotid disease, PE in 2022 s/p thrombectomy post-covid vaccine, on Eliquis 2.5mg BID and ASA, DM, CKD, Anemia, HLD who presented for transient episode of slurred speech and R arm weakness -- she had difficulty raising her right arm and pulling herself up.  No other focal weakness. Her children also noted she was slurring her words. Upon arrival here, no more symptoms, denied any prior episodes.  Admits to noncompliance with Eliquis and misses nightly dose regularly.  Follows with hematology/pulm/nephro.  Has known anemia and CKD, she had Imaging done in the ED showed CT HEAD: No acute intracranial findings, CT PERFUSION: No focal perfusion deficit, CT ANGIOGRAPHY NECK: Right carotid stent appears patent. Short-segment moderate narrowing proximal left cervical ICA.  CT ANGIOGRAPHY HEAD: Moderate narrowing left cavernous segment ICA. Severe narrowing right distal petrous/proximal cavernous segment ICA. Mild to moderate narrowing right paraclinoid segment ICA. Focal moderate narrowing right proximal P2 segment. Focal near-complete occlusion right distal P2 segment, patient is admitted under stroke team and transferred to Medicine on 6/4/25.    TIA   History of CVA / Carotid Stenosis   MRI with no acute stroke   Stroke neuro checks q 4 hours    Continues Eliquis 2.5mg BID and ASA 81mg daily  Continue Atorvastatin 80 mg daily  Neuro recs noted     JENNIFER on CKD 3  HCTZ on hold  Avoid nephrotoxic medications   Continue Sodium Bicarb  Nephro recs appreciated     R/O UTI  Abnormal UA on 6/2  s/p 2 doses of Rocephin   Repeat UA   Urine culture     DM 2  A1c 8.7  Hold Glipizide and Metformin  Accu checks and ISS  Continue Lantus 15 units and Premeal Admelog 3 units     HTN  Continue Atenolol  Increase Hydralazine to 50 mg q 8 hours  Hold HCTZ due to JENNIFER    HLD  Continue Lipitor     Acute on Chronic Anemia  s/p 1 PRBC  Continue Venofer   Monitor     DVT Prophylaxis -- Patient is on Eliquis    Dispo: Likely home once stable, pending improvement in renal function.

## 2025-06-05 NOTE — PROGRESS NOTE ADULT - SUBJECTIVE AND OBJECTIVE BOX
Transient cerebral ischemia    CC: slurred speech.     HPI:  The patient is a 85 y/o F with hx of prior stroke in 2022, hx of Right carotid stent > 5 years for asymptomatic carotid disease, PE in 2022 s/p thrombectomy post-covid vaccine, on Eliquis 2.5mg BID and ASA, DM, CKD, Anemia, HLD who presents for transient episode of slurred speech and R arm weakness. Last well known last evening, woke up this AM at 7:30AM and noted that she had difficulty raising her right arm and pulling herself up.  No other focal weakness. Her children also noted she was slurring her words. Upon arrival here, no more symptoms.      Denies any prior episode.  Admits to noncompliance with Eliquis and misses nightly dose regularly.  Follows with hematology/pulm/nephro.  Has known anemia and CKD. (01 Jun 2025 13:10)    Interval History:  Patient was seen and examined at bedside around 9:30 am.  Feels OK.   Denies chest pain, palpitations, shortness of breath, headache, dizziness, visual symptoms, nausea, vomiting or abdominal pain.  Was having dysuria couple of days ago. Denies any urinary symptoms now.     ROS:  As per interval history otherwise unremarkable.    PHYSICAL EXAM:  Vital Signs   T(C): 36.9 (05 Jun 2025 11:42), Max: 37.1 (05 Jun 2025 10:15)  T(F): 98.4 (05 Jun 2025 11:42), Max: 98.7 (05 Jun 2025 10:15)  HR: 73 (05 Jun 2025 11:42) (69 - 79)  BP: 157/69 (05 Jun 2025 11:42) (135/71 - 190/61)  RR: 18 (05 Jun 2025 11:42) (17 - 19)  SpO2: 98% (05 Jun 2025 11:42) (96% - 99%)  Parameters below as of 05 Jun 2025 11:42  Patient On (Oxygen Delivery Method): room air  General: Elderly female lying in bed comfortably. No acute distress  HEENT: EOMI. Clear conjunctivae. Moist mucus membrane  Neck: Supple.   Chest: Good air entry. No wheezing, rales or rhonchi.   Heart: Normal S1 & S2. RRR.   Abdomen: Non distended. Soft. Non-tender. + BS  Ext: No pedal edema. No calf tenderness   Neuro: Awake and alert. Motor 5/5 in all extremities. Sensation intact. Reflexes 1+. Cerebellar signs intact. Speech clear.   Skin: Warm and Dry  Psychiatry: Normal mood and affect    I&O's Summary    04 Jun 2025 07:01  -  05 Jun 2025 07:00  --------------------------------------------------------  IN: 120 mL / OUT: 1250 mL / NET: -1130 mL    05 Jun 2025 07:01  -  05 Jun 2025 12:48  --------------------------------------------------------  IN: 222 mL / OUT: 0 mL / NET: 222 mL    LABS:  CAPILLARY BLOOD GLUCOSE  POCT Blood Glucose.: 167 mg/dL (05 Jun 2025 11:37)  POCT Blood Glucose.: 171 mg/dL (05 Jun 2025 08:12)  POCT Blood Glucose.: 157 mg/dL (04 Jun 2025 21:52)  POCT Blood Glucose.: 279 mg/dL (04 Jun 2025 16:55)                      8.6    8.53  )-----------( 222      ( 05 Jun 2025 05:39 )             28.5     06-05    139  |  108  |  39.2[H]  ----------------------------<  153[H]  5.0   |  19.0[L]  |  2.69[H]    Ca    8.0[L]      05 Jun 2025 05:39  Phos  4.4     06-04  Mg     2.2     06-05    Urinalysis Basic - ( 05 Jun 2025 05:39 )    Color: x / Appearance: x / SG: x / pH: x  Gluc: 153 mg/dL / Ketone: x  / Bili: x / Urobili: x   Blood: x / Protein: x / Nitrite: x   Leuk Esterase: x / RBC: x / WBC x   Sq Epi: x / Non Sq Epi: x / Bacteria: x    RADIOLOGY & ADDITIONAL STUDIES:  Reviewed     MEDICATIONS  (STANDING):  apixaban 2.5 milliGRAM(s) Oral every 12 hours  ascorbic acid 500 milliGRAM(s) Oral daily  aspirin enteric coated 81 milliGRAM(s) Oral daily  atenolol  Tablet 100 milliGRAM(s) Oral daily  atorvastatin 80 milliGRAM(s) Oral at bedtime  dextrose 5%. 1000 milliLiter(s) (50 mL/Hr) IV Continuous <Continuous>  dextrose 5%. 1000 milliLiter(s) (100 mL/Hr) IV Continuous <Continuous>  dextrose 50% Injectable 25 Gram(s) IV Push once  dextrose 50% Injectable 12.5 Gram(s) IV Push once  dextrose 50% Injectable 25 Gram(s) IV Push once  ferrous    sulfate 325 milliGRAM(s) Oral every 8 hours  glucagon  Injectable 1 milliGRAM(s) IntraMuscular once  hydrALAZINE 50 milliGRAM(s) Oral every 8 hours  insulin glargine Injectable (LANTUS) 15 Unit(s) SubCutaneous at bedtime  insulin lispro (ADMELOG) corrective regimen sliding scale   SubCutaneous Before meals and at bedtime  insulin lispro Injectable (ADMELOG) 3 Unit(s) SubCutaneous three times a day before meals  iron sucrose IVPB 100 milliGRAM(s) IV Intermittent every 24 hours  senna 2 Tablet(s) Oral at bedtime  sodium bicarbonate 650 milliGRAM(s) Oral two times a day    MEDICATIONS  (PRN):  acetaminophen     Tablet .. 650 milliGRAM(s) Oral every 6 hours PRN Temp greater or equal to 38C (100.4F), Moderate Pain (4 - 6)  dextrose Oral Gel 15 Gram(s) Oral once PRN Blood Glucose LESS THAN 70 milliGRAM(s)/deciliter  hydrALAZINE Injectable 10 milliGRAM(s) IV Push every 4 hours   labetalol Injectable 10 milliGRAM(s) IV Push every 4 hours PRN SBP > 190  polyethylene glycol 3350 17 Gram(s) Oral at bedtime PRN Constipation

## 2025-06-06 LAB
ANION GAP SERPL CALC-SCNC: 14 MMOL/L — SIGNIFICANT CHANGE UP (ref 5–17)
BASOPHILS # BLD AUTO: 0.07 K/UL — SIGNIFICANT CHANGE UP (ref 0–0.2)
BASOPHILS NFR BLD AUTO: 0.8 % — SIGNIFICANT CHANGE UP (ref 0–2)
BUN SERPL-MCNC: 43 MG/DL — HIGH (ref 8–20)
CALCIUM SERPL-MCNC: 9 MG/DL — SIGNIFICANT CHANGE UP (ref 8.4–10.5)
CHLORIDE SERPL-SCNC: 104 MMOL/L — SIGNIFICANT CHANGE UP (ref 96–108)
CO2 SERPL-SCNC: 20 MMOL/L — LOW (ref 22–29)
CREAT ?TM UR-MCNC: 76 MG/DL — SIGNIFICANT CHANGE UP
CREAT SERPL-MCNC: 2.49 MG/DL — HIGH (ref 0.5–1.3)
EGFR: 18 ML/MIN/1.73M2 — LOW
EGFR: 18 ML/MIN/1.73M2 — LOW
EOSINOPHIL # BLD AUTO: 0.19 K/UL — SIGNIFICANT CHANGE UP (ref 0–0.5)
EOSINOPHIL NFR BLD AUTO: 2.3 % — SIGNIFICANT CHANGE UP (ref 0–6)
GLUCOSE BLDC GLUCOMTR-MCNC: 136 MG/DL — HIGH (ref 70–99)
GLUCOSE BLDC GLUCOMTR-MCNC: 141 MG/DL — HIGH (ref 70–99)
GLUCOSE BLDC GLUCOMTR-MCNC: 184 MG/DL — HIGH (ref 70–99)
GLUCOSE BLDC GLUCOMTR-MCNC: 194 MG/DL — HIGH (ref 70–99)
GLUCOSE SERPL-MCNC: 115 MG/DL — HIGH (ref 70–99)
HCT VFR BLD CALC: 29.9 % — LOW (ref 34.5–45)
HGB BLD-MCNC: 9.1 G/DL — LOW (ref 11.5–15.5)
IMM GRANULOCYTES # BLD AUTO: 0.04 K/UL — SIGNIFICANT CHANGE UP (ref 0–0.07)
IMM GRANULOCYTES NFR BLD AUTO: 0.5 % — SIGNIFICANT CHANGE UP (ref 0–0.9)
LYMPHOCYTES # BLD AUTO: 2.89 K/UL — SIGNIFICANT CHANGE UP (ref 1–3.3)
LYMPHOCYTES NFR BLD AUTO: 34.5 % — SIGNIFICANT CHANGE UP (ref 13–44)
MAGNESIUM SERPL-MCNC: 2.1 MG/DL — SIGNIFICANT CHANGE UP (ref 1.6–2.6)
MCHC RBC-ENTMCNC: 24.5 PG — LOW (ref 27–34)
MCHC RBC-ENTMCNC: 30.4 G/DL — LOW (ref 32–36)
MCV RBC AUTO: 80.4 FL — SIGNIFICANT CHANGE UP (ref 80–100)
MONOCYTES # BLD AUTO: 0.78 K/UL — SIGNIFICANT CHANGE UP (ref 0–0.9)
MONOCYTES NFR BLD AUTO: 9.3 % — SIGNIFICANT CHANGE UP (ref 2–14)
NEUTROPHILS # BLD AUTO: 4.41 K/UL — SIGNIFICANT CHANGE UP (ref 1.8–7.4)
NEUTROPHILS NFR BLD AUTO: 52.6 % — SIGNIFICANT CHANGE UP (ref 43–77)
NRBC # BLD AUTO: 0 K/UL — SIGNIFICANT CHANGE UP (ref 0–0)
NRBC # FLD: 0 K/UL — SIGNIFICANT CHANGE UP (ref 0–0)
NRBC BLD AUTO-RTO: 0 /100 WBCS — SIGNIFICANT CHANGE UP (ref 0–0)
PLATELET # BLD AUTO: 249 K/UL — SIGNIFICANT CHANGE UP (ref 150–400)
PMV BLD: 11 FL — SIGNIFICANT CHANGE UP (ref 7–13)
POTASSIUM SERPL-MCNC: 4.7 MMOL/L — SIGNIFICANT CHANGE UP (ref 3.5–5.3)
POTASSIUM SERPL-SCNC: 4.7 MMOL/L — SIGNIFICANT CHANGE UP (ref 3.5–5.3)
RBC # BLD: 3.72 M/UL — LOW (ref 3.8–5.2)
RBC # FLD: 18.6 % — HIGH (ref 10.3–14.5)
SODIUM SERPL-SCNC: 138 MMOL/L — SIGNIFICANT CHANGE UP (ref 135–145)
SODIUM UR-SCNC: 86 MMOL/L — SIGNIFICANT CHANGE UP
WBC # BLD: 8.38 K/UL — SIGNIFICANT CHANGE UP (ref 3.8–10.5)
WBC # FLD AUTO: 8.38 K/UL — SIGNIFICANT CHANGE UP (ref 3.8–10.5)

## 2025-06-06 PROCEDURE — 99232 SBSQ HOSP IP/OBS MODERATE 35: CPT

## 2025-06-06 RX ORDER — SODIUM BICARBONATE 1 MEQ/ML
650 SYRINGE (ML) INTRAVENOUS THREE TIMES A DAY
Refills: 0 | Status: DISCONTINUED | OUTPATIENT
Start: 2025-06-06 | End: 2025-06-10

## 2025-06-06 RX ADMIN — ATORVASTATIN CALCIUM 80 MILLIGRAM(S): 80 TABLET, FILM COATED ORAL at 22:17

## 2025-06-06 RX ADMIN — Medication 650 MILLIGRAM(S): at 06:08

## 2025-06-06 RX ADMIN — INSULIN LISPRO 2: 100 INJECTION, SOLUTION INTRAVENOUS; SUBCUTANEOUS at 17:41

## 2025-06-06 RX ADMIN — Medication 650 MILLIGRAM(S): at 22:18

## 2025-06-06 RX ADMIN — INSULIN LISPRO 3 UNIT(S): 100 INJECTION, SOLUTION INTRAVENOUS; SUBCUTANEOUS at 17:42

## 2025-06-06 RX ADMIN — INSULIN LISPRO 3 UNIT(S): 100 INJECTION, SOLUTION INTRAVENOUS; SUBCUTANEOUS at 09:33

## 2025-06-06 RX ADMIN — Medication 81 MILLIGRAM(S): at 13:01

## 2025-06-06 RX ADMIN — INSULIN LISPRO 2: 100 INJECTION, SOLUTION INTRAVENOUS; SUBCUTANEOUS at 13:33

## 2025-06-06 RX ADMIN — Medication 325 MILLIGRAM(S): at 13:02

## 2025-06-06 RX ADMIN — Medication 650 MILLIGRAM(S): at 13:03

## 2025-06-06 RX ADMIN — APIXABAN 2.5 MILLIGRAM(S): 2.5 TABLET, FILM COATED ORAL at 17:42

## 2025-06-06 RX ADMIN — Medication 50 MILLIGRAM(S): at 06:08

## 2025-06-06 RX ADMIN — Medication 75 MILLIGRAM(S): at 22:18

## 2025-06-06 RX ADMIN — INSULIN GLARGINE-YFGN 15 UNIT(S): 100 INJECTION, SOLUTION SUBCUTANEOUS at 22:17

## 2025-06-06 RX ADMIN — Medication 325 MILLIGRAM(S): at 06:07

## 2025-06-06 RX ADMIN — Medication 325 MILLIGRAM(S): at 22:16

## 2025-06-06 RX ADMIN — LISINOPRIL 100 MILLIGRAM(S): 30 TABLET ORAL at 06:08

## 2025-06-06 RX ADMIN — Medication 500 MILLIGRAM(S): at 13:01

## 2025-06-06 RX ADMIN — IRON SUCROSE 210 MILLIGRAM(S): 20 INJECTION, SOLUTION INTRAVENOUS at 09:33

## 2025-06-06 RX ADMIN — INSULIN LISPRO 3 UNIT(S): 100 INJECTION, SOLUTION INTRAVENOUS; SUBCUTANEOUS at 13:34

## 2025-06-06 RX ADMIN — APIXABAN 2.5 MILLIGRAM(S): 2.5 TABLET, FILM COATED ORAL at 06:09

## 2025-06-06 RX ADMIN — Medication 75 MILLIGRAM(S): at 13:03

## 2025-06-06 NOTE — DIETITIAN INITIAL EVALUATION ADULT - PERTINENT MEDS FT
MEDICATIONS  (STANDING):  ascorbic acid 500 milliGRAM(s) Oral daily  dextrose 5%. 1000 milliLiter(s) (100 mL/Hr) IV Continuous <Continuous>  ferrous    sulfate 325 milliGRAM(s) Oral every 8 hours  hydrALAZINE 75 milliGRAM(s) Oral every 8 hours  insulin lispro (ADMELOG) corrective regimen sliding scale   SubCutaneous Before meals and at bedtime  iron sucrose IVPB 100 milliGRAM(s) IV Intermittent every 24 hours  senna 2 Tablet(s) Oral at bedtime  sodium bicarbonate 650 milliGRAM(s) Oral three times a day

## 2025-06-06 NOTE — DIETITIAN INITIAL EVALUATION ADULT - ORAL INTAKE PTA/DIET HISTORY
Spoke with pt this AM. Good po intake noted today and PTA. Consumed 100% of breakfast meal this AM. UBW: 220#. No known weight change reported. Last BM documented 6/5. A1C 8.7%. RD offered diet/nutrition education, pt declined at this time. RD to remain available.

## 2025-06-06 NOTE — PROGRESS NOTE ADULT - ASSESSMENT
85 y/o F w h/o prior cva in 2022, hx of Right carotid stent > 5 years for asymptomatic carotid disease, PE in 2022 s/p thrombectomy post-covid vaccine, on Eliquis 2.5mg BID and ASA, DM, CKD, Anemia, HLD who presents for transient episode of slurred speech and R arm weakness    JENNIFER on CKD III - improving   S/P CTA 6/1/25   Suspect episode RCN / ATN   Serum Cr 2.6--> 2.4   Prior Cr 1.8  Would cont to hold Metformin/ Enalapril   No hydro noted on US 6/3/25     HTN - Better w/ med adjustments     MA - cont oral Bicarb inc to TID    Anemia -  s/p transfusion 6/4  Noted low iron stores --->  cont  Venofer x 3 until completion  B 12 and folate OK   When BP improved  will add Retacrit     Monitor labs will follow

## 2025-06-06 NOTE — DIETITIAN INITIAL EVALUATION ADULT - NS FNS DIET ORDER
Diet, Consistent Carbohydrate/No Snacks:   DASH/TLC {Sodium & Cholesterol Restricted} (DASH) (06-01-25 @ 18:07) [Active]

## 2025-06-06 NOTE — DIETITIAN INITIAL EVALUATION ADULT - NUTRITIONGOAL OUTCOME1
Pt to adhere to nutrition-related recommendations to meet >75% of estimated energy needs via tolerated route to improve nutrition status

## 2025-06-06 NOTE — DIETITIAN INITIAL EVALUATION ADULT - ADD RECOMMEND
- Monitor weights daily for trend/accuracy   - Continue diet as tolerated.  - Rx MVI daily, vit C 500mg   - continue iron daily

## 2025-06-06 NOTE — PROGRESS NOTE ADULT - SUBJECTIVE AND OBJECTIVE BOX
Transient cerebral ischemia    CC: slurred speech.     HPI:  The patient is a 83 y/o F with hx of prior stroke in 2022, hx of Right carotid stent > 5 years for asymptomatic carotid disease, PE in 2022 s/p thrombectomy post-covid vaccine, on Eliquis 2.5mg BID and ASA, DM, CKD, Anemia, HLD who presents for transient episode of slurred speech and R arm weakness. Last well known last evening, woke up this AM at 7:30AM and noted that she had difficulty raising her right arm and pulling herself up.  No other focal weakness. Her children also noted she was slurring her words. Upon arrival here, no more symptoms.      Denies any prior episode.  Admits to noncompliance with Eliquis and misses nightly dose regularly.  Follows with hematology/pulm/nephro.  Has known anemia and CKD. (01 Jun 2025 13:10)    Interval History:  Patient was seen and examined at bedside around 10 am.  Doing well.   No active complaints.     ROS:  As per interval history otherwise unremarkable.    PHYSICAL EXAM:  Vital Signs   T(C): 36.8 (06 Jun 2025 11:49), Max: 37.4 (06 Jun 2025 00:14)  T(F): 98.3 (06 Jun 2025 11:49), Max: 99.3 (06 Jun 2025 00:14)  HR: 74 (06 Jun 2025 11:49) (72 - 77)  BP: 144/69 (06 Jun 2025 11:49) (132/60 - 169/67)  BP(mean): 94 (06 Jun 2025 11:49) (94 - 94)  RR: 19 (06 Jun 2025 11:49) (18 - 20)  SpO2: 94% (06 Jun 2025 11:49) (94% - 98%)  Parameters below as of 06 Jun 2025 11:49  Patient On (Oxygen Delivery Method): room air  General: Elderly female sitting in recliner comfortably. No acute distress  HEENT: EOMI. Clear conjunctivae. Moist mucus membrane  Neck: Supple.   Chest: Good air entry. No wheezing, rales or rhonchi.   Heart: Normal S1 & S2. RRR.   Abdomen: Non distended. Soft. Non-tender. + BS  Ext: No pedal edema. No calf tenderness   Neuro: Awake and alert. No focal deficit. Speech clear.   Skin: Warm and Dry  Psychiatry: Normal mood and affect    I&O's Summary    05 Jun 2025 07:01  -  06 Jun 2025 07:00  --------------------------------------------------------  IN: 222 mL / OUT: 1200 mL / NET: -978 mL    06 Jun 2025 07:01  -  06 Jun 2025 14:46  --------------------------------------------------------  IN: 120 mL / OUT: 0 mL / NET: 120 mL    LABS:                        9.1    8.38  )-----------( 249      ( 06 Jun 2025 06:26 )             29.9     06-06    138  |  104  |  43.0[H]  ----------------------------<  115[H]  4.7   |  20.0[L]  |  2.49[H]    Ca    9.0      06 Jun 2025 06:26  Mg     2.1     06-06    RADIOLOGY & ADDITIONAL STUDIES:  Reviewed     MEDICATIONS  (STANDING):  apixaban 2.5 milliGRAM(s) Oral every 12 hours  ascorbic acid 500 milliGRAM(s) Oral daily  aspirin enteric coated 81 milliGRAM(s) Oral daily  atenolol  Tablet 100 milliGRAM(s) Oral daily  atorvastatin 80 milliGRAM(s) Oral at bedtime  dextrose 5%. 1000 milliLiter(s) (50 mL/Hr) IV Continuous <Continuous>  dextrose 5%. 1000 milliLiter(s) (100 mL/Hr) IV Continuous <Continuous>  dextrose 50% Injectable 25 Gram(s) IV Push once  dextrose 50% Injectable 12.5 Gram(s) IV Push once  dextrose 50% Injectable 25 Gram(s) IV Push once  ferrous    sulfate 325 milliGRAM(s) Oral every 8 hours  glucagon  Injectable 1 milliGRAM(s) IntraMuscular once  hydrALAZINE 75 milliGRAM(s) Oral every 8 hours  insulin glargine Injectable (LANTUS) 15 Unit(s) SubCutaneous at bedtime  insulin lispro (ADMELOG) corrective regimen sliding scale   SubCutaneous Before meals and at bedtime  insulin lispro Injectable (ADMELOG) 3 Unit(s) SubCutaneous three times a day before meals  iron sucrose IVPB 100 milliGRAM(s) IV Intermittent every 24 hours  senna 2 Tablet(s) Oral at bedtime  sodium bicarbonate 650 milliGRAM(s) Oral three times a day    MEDICATIONS  (PRN):  acetaminophen     Tablet .. 650 milliGRAM(s) Oral every 6 hours PRN Temp greater or equal to 38C (100.4F), Moderate Pain (4 - 6)  dextrose Oral Gel 15 Gram(s) Oral once PRN Blood Glucose LESS THAN 70 milliGRAM(s)/deciliter  hydrALAZINE Injectable 10 milliGRAM(s) IV Push every 4 hours   labetalol Injectable 10 milliGRAM(s) IV Push every 4 hours PRN SBP > 190  ondansetron Injectable 4 milliGRAM(s) IV Push every 6 hours PRN Nausea and/or Vomiting  polyethylene glycol 3350 17 Gram(s) Oral at bedtime PRN Constipation

## 2025-06-06 NOTE — PROGRESS NOTE ADULT - ASSESSMENT
85 y/o F with hx of prior stroke in 2022, hx of Right carotid stent > 5 years for asymptomatic carotid disease, PE in 2022 s/p thrombectomy post-covid vaccine, on Eliquis 2.5mg BID and ASA, DM, CKD, Anemia, HLD who presented for transient episode of slurred speech and R arm weakness -- she had difficulty raising her right arm and pulling herself up.  No other focal weakness. Her children also noted she was slurring her words. Upon arrival here, no more symptoms, denied any prior episodes.  Admits to noncompliance with Eliquis and misses nightly dose regularly.  Follows with hematology/pulm/nephro.  Has known anemia and CKD, she had Imaging done in the ED showed CT HEAD: No acute intracranial findings, CT PERFUSION: No focal perfusion deficit, CT ANGIOGRAPHY NECK: Right carotid stent appears patent. Short-segment moderate narrowing proximal left cervical ICA.  CT ANGIOGRAPHY HEAD: Moderate narrowing left cavernous segment ICA. Severe narrowing right distal petrous/proximal cavernous segment ICA. Mild to moderate narrowing right paraclinoid segment ICA. Focal moderate narrowing right proximal P2 segment. Focal near-complete occlusion right distal P2 segment, patient is admitted under stroke team and transferred to Medicine on 6/4/25.    TIA   History of CVA / Carotid Stenosis   MRI with no acute stroke   Stroke neuro checks q 8 hours    Continues Eliquis 2.5mg BID and ASA 81mg daily  Continue Atorvastatin 80 mg daily  Neuro recs noted     JENNIFER on CKD 3  HCTZ on hold  Avoid nephrotoxic medications   Continue Sodium Bicarb  Nephro recs appreciated     R/O UTI  Abnormal UA on 6/2  s/p 2 doses of Rocephin   Repeat UA pending from yesterday   Urine culture pending     DM 2  A1c 8.7  Hold Glipizide and Metformin  Accu checks and ISS  Continue Lantus 15 units and Premeal Admelog 3 units     HTN  Continue Atenolol  Increase Hydralazine to 75 mg q 8 hours  Hold HCTZ due to JENNIFER    HLD  Continue Lipitor     Acute on Chronic Anemia  s/p 1 PRBC  Continue Venofer   Monitor     DVT Prophylaxis -- Patient is on Eliquis    Dispo: Home likely in 48 hours if renal function continues to improve.

## 2025-06-06 NOTE — DIETITIAN INITIAL EVALUATION ADULT - PERTINENT LABORATORY DATA
06-06 Na138 mmol/L Glu 115 mg/dL[H] K+ 4.7 mmol/L Cr  2.49 mg/dL[H] BUN 43.0 mg/dL[H]  A1C with Estimated Average Glucose Result: 8.7 % (06-02-25 @ 06:42)  A1C with Estimated Average Glucose Result: 8.6 % (06-01-25 @ 09:08)

## 2025-06-06 NOTE — PROGRESS NOTE ADULT - SUBJECTIVE AND OBJECTIVE BOX
INCOMPLETE NOTE ****************************************  Preliminary note, official recommendations pending attending review/signature   Seen and examined by Stroke team attending/team, assessment/ plan as discussed with stroke team attending/team as noted.     [facility] Stroke Team  Progress Note     HPI:  Ms. Stephens is a 83 y/o woman with hx of prior stroke in 2022, hx of Right carotid stent > 5 years for asymptomatic carotid disease, PE in 2022 s/p thrombectomy post-covid vaccine, on Apixaban 2.5mg BID and ASA, DM, CKD, Anemia, HLD who presented for transient episode of slurred speech and Rt arm weakness. Last well known last evening prior to presentation, woke up  AM of admission at 7:30AM and noted that she had difficulty raising her right arm and pulling herself up.  No other focal weakness. Her children also noted she was slurring her words. Upon arrival here, no more symptoms were noted.      Denied any prior episode.  Admitted to noncompliance with Apixaban and misses nightly dose regularly.  Follows with hematology/pulm/nephro.  Has known anemia and CKD.     SUBJECTIVE: No events overnight.  No new neurologic complaints.  ROS reported negative unless otherwise noted.    acetaminophen     Tablet .. 650 milliGRAM(s) Oral every 6 hours PRN  apixaban 2.5 milliGRAM(s) Oral every 12 hours  ascorbic acid 500 milliGRAM(s) Oral daily  aspirin enteric coated 81 milliGRAM(s) Oral daily  atenolol  Tablet 100 milliGRAM(s) Oral daily  atorvastatin 80 milliGRAM(s) Oral at bedtime  dextrose 5%. 1000 milliLiter(s) IV Continuous <Continuous>  dextrose 5%. 1000 milliLiter(s) IV Continuous <Continuous>  dextrose 50% Injectable 25 Gram(s) IV Push once  dextrose 50% Injectable 12.5 Gram(s) IV Push once  dextrose 50% Injectable 25 Gram(s) IV Push once  dextrose Oral Gel 15 Gram(s) Oral once PRN  ferrous    sulfate 325 milliGRAM(s) Oral every 8 hours  glucagon  Injectable 1 milliGRAM(s) IntraMuscular once  hydrALAZINE 75 milliGRAM(s) Oral every 8 hours  hydrALAZINE Injectable 10 milliGRAM(s) IV Push every 4 hours PRN  insulin glargine Injectable (LANTUS) 15 Unit(s) SubCutaneous at bedtime  insulin lispro (ADMELOG) corrective regimen sliding scale   SubCutaneous Before meals and at bedtime  insulin lispro Injectable (ADMELOG) 3 Unit(s) SubCutaneous three times a day before meals  iron sucrose IVPB 100 milliGRAM(s) IV Intermittent every 24 hours  labetalol Injectable 10 milliGRAM(s) IV Push every 4 hours PRN  ondansetron Injectable 4 milliGRAM(s) IV Push every 6 hours PRN  polyethylene glycol 3350 17 Gram(s) Oral at bedtime PRN  senna 2 Tablet(s) Oral at bedtime  sodium bicarbonate 650 milliGRAM(s) Oral two times a day      PHYSICAL EXAM:   Vital Signs Last 24 Hrs  T(C): 37 (06 Jun 2025 08:26), Max: 37.4 (06 Jun 2025 00:14)  T(F): 98.6 (06 Jun 2025 08:26), Max: 99.3 (06 Jun 2025 00:14)  HR: 75 (06 Jun 2025 08:26) (69 - 77)  BP: 154/73 (06 Jun 2025 08:26) (132/60 - 171/77)  BP(mean): --  RR: 20 (06 Jun 2025 08:26) (18 - 20)  SpO2: 95% (06 Jun 2025 08:26) (95% - 98%)    Parameters below as of 06 Jun 2025 08:26  Patient On (Oxygen Delivery Method): room air      EXAM PENDING *****************************************      General: No acute distress.       Detailed Neurologic Exam:    Mental status: The patient is awake and alert and has normal attention span.  The patient is fully oriented in 3 spheres. The patient is able to name objects, follow commands, repeat sentences.  Cranial nerves: Pupils equal and react symmetrically to light. There is no visual field deficit to confrontation. Extraocular motion is full with no nystagmus. There is no ptosis. Facial sensation is intact. Facial musculature is symmetric. Palate elevates symmetrically. Tongue is midline.  Motor: There is normal bulk and tone.  There is no tremor.  Strength is 5/5 in the right arm and leg.   Strength is 5/5 in the left arm and leg.  Sensation: Intact to light touch in 4 extremities  Cerebellar: There is no dysmetria on finger to nose testing.    LABS:                        9.1    8.38  )-----------( 249      ( 06 Jun 2025 06:26 )             29.9    06-06    138  |  104  |  43.0[H]  ----------------------------<  115[H]  4.7   |  20.0[L]  |  2.49[H]    Ca    9.0      06 Jun 2025 06:26  Mg     2.1     06-06          06-02 Chol 200[H] LDL -- HDL 38[L] Trig 171[H]    A1C:    IMAGING: Reviewed by me.     Neuro-Imaging     MR Head No Cont (06.03.25 @ 16:29)   IMPRESSION:  No evidence of a mass or current evidence of acute ischemia. Chronic   small infarcts and white matter ischemic changes.     (06.01.25 @ 09:08)  IMPRESSION:  CT HEAD: No acute intracranial findings.  CT PERFUSION: No focal perfusion deficit.  CT ANGIOGRAPHY NECK: Right carotid stent appears patent. Short-segment   moderatenarrowing proximal left cervical ICA.  CT ANGIOGRAPHY HEAD: Moderate narrowing left cavernous segment ICA.   Severe narrowing right distal petrous/proximal cavernous segment ICA.   Mild to moderate narrowing right paraclinoid segment ICA. Focal moderate   narrowing right proximal P2 segment. Focal near-complete occlusion right   distal P2 segment.  --  ULTRASOUND   US Renal (06.03.25 @ 16:17)   Limited study.  Atrophic kidneys without hydronephrosis, likely reflecting medical renal   disease.  Evaluation of the right and left renal parenchyma is markedly limited due   to shadowing artifact.    US Duplex Venous Lower Ext Complete, Bilateral (06.01.25 @ 15:39)  IMPRESSION:  No evidence of deep venous thrombosis in either lower extremity.  --  CARDIOLOGY   TTE W or WO Ultrasound Enhancing Agent (06.01.25 @ 13:50)  CONCLUSIONS:      1. Left ventricular systolic function is normal with an ejection fraction visually estimated at 65 %. There are no regional wall motion abnormalities seen.   2. Mild left ventricular hypertrophy.   3. There is mild (grade 1) left ventricular diastolic dysfunction, with normal left ventricular filling pressure.   4. Normal right ventricular cavity size.   5. Left atrium is normal in size.   6. There is mild posterior calcification of the mitral valve annulus.   7. Estimatedpulmonary artery systolic pressure is 22 mmHg.   8. Fibrocalcific aortic valve sclerosis without stenosis.   9. The interatrial septum appears intact.  10. No pericardial effusion seen.  --  XRAY  Xray Chest 1 View AP/PA. (06.01.25 @ 09:35)   HEART:difficult to access in this projection.  LUNGS: retrocardiac infiltrate..  BONES: degenerative changes              Preliminary note, official recommendations pending attending review/signature   Seen and examined by Stroke team attending/team, assessment/ plan as discussed with stroke team attending/team as noted.     [facility] Stroke Team  Progress Note     HPI:  Ms. Stephens is a 85 y/o woman with hx of prior stroke in 2022, hx of Right carotid stent > 5 years for asymptomatic carotid disease, PE in 2022 s/p thrombectomy post-covid vaccine, on Apixaban 2.5mg BID and ASA, DM, CKD, Anemia, HLD who presented for transient episode of slurred speech and Rt arm weakness. Last well known last evening prior to presentation, woke up  AM of admission at 7:30AM and noted that she had difficulty raising her right arm and pulling herself up.  No other focal weakness. Her children also noted she was slurring her words. Upon arrival here, no more symptoms were noted.      Denied any prior episode.  Admitted to noncompliance with Apixaban and misses nightly dose regularly.  Follows with hematology/pulm/nephro.  Has known anemia and CKD.     SUBJECTIVE: No events overnight.  No new neurologic complaints.  ROS reported negative unless otherwise noted.    acetaminophen     Tablet .. 650 milliGRAM(s) Oral every 6 hours PRN  apixaban 2.5 milliGRAM(s) Oral every 12 hours  ascorbic acid 500 milliGRAM(s) Oral daily  aspirin enteric coated 81 milliGRAM(s) Oral daily  atenolol  Tablet 100 milliGRAM(s) Oral daily  atorvastatin 80 milliGRAM(s) Oral at bedtime  dextrose 5%. 1000 milliLiter(s) IV Continuous <Continuous>  dextrose 5%. 1000 milliLiter(s) IV Continuous <Continuous>  dextrose 50% Injectable 25 Gram(s) IV Push once  dextrose 50% Injectable 12.5 Gram(s) IV Push once  dextrose 50% Injectable 25 Gram(s) IV Push once  dextrose Oral Gel 15 Gram(s) Oral once PRN  ferrous    sulfate 325 milliGRAM(s) Oral every 8 hours  glucagon  Injectable 1 milliGRAM(s) IntraMuscular once  hydrALAZINE 75 milliGRAM(s) Oral every 8 hours  hydrALAZINE Injectable 10 milliGRAM(s) IV Push every 4 hours PRN  insulin glargine Injectable (LANTUS) 15 Unit(s) SubCutaneous at bedtime  insulin lispro (ADMELOG) corrective regimen sliding scale   SubCutaneous Before meals and at bedtime  insulin lispro Injectable (ADMELOG) 3 Unit(s) SubCutaneous three times a day before meals  iron sucrose IVPB 100 milliGRAM(s) IV Intermittent every 24 hours  labetalol Injectable 10 milliGRAM(s) IV Push every 4 hours PRN  ondansetron Injectable 4 milliGRAM(s) IV Push every 6 hours PRN  polyethylene glycol 3350 17 Gram(s) Oral at bedtime PRN  senna 2 Tablet(s) Oral at bedtime  sodium bicarbonate 650 milliGRAM(s) Oral two times a day      PHYSICAL EXAM:   Vital Signs Last 24 Hrs  T(C): 37 (06 Jun 2025 08:26), Max: 37.4 (06 Jun 2025 00:14)  T(F): 98.6 (06 Jun 2025 08:26), Max: 99.3 (06 Jun 2025 00:14)  HR: 75 (06 Jun 2025 08:26) (69 - 77)  BP: 154/73 (06 Jun 2025 08:26) (132/60 - 171/77)  BP(mean): --  RR: 20 (06 Jun 2025 08:26) (18 - 20)  SpO2: 95% (06 Jun 2025 08:26) (95% - 98%)    Parameters below as of 06 Jun 2025 08:26  Patient On (Oxygen Delivery Method): room air          General: No acute distress.       Detailed Neurologic Exam:    Mental status: The patient is awake and alert and has normal attention span.  The patient is fully oriented in 3 spheres. The patient is able to name objects, follow commands, repeat sentences.  Cranial nerves: Pupils equal and react symmetrically to light. There is no visual field deficit to confrontation. Extraocular motion is full with no nystagmus. There is no ptosis. Facial sensation is intact. Facial musculature is symmetric. Palate elevates symmetrically. Tongue is midline.  Motor: There is normal bulk and tone.  There is no tremor.  Strength is 5/5 in the right arm and leg.   Strength is 5/5 in the left arm and  4+/5 leg (pain limiting).  Sensation: Intact to light touch in 4 extremities  Cerebellar: There is no dysmetria on finger to nose testing.    LABS:                        9.1    8.38  )-----------( 249      ( 06 Jun 2025 06:26 )             29.9    06-06    138  |  104  |  43.0[H]  ----------------------------<  115[H]  4.7   |  20.0[L]  |  2.49[H]    Ca    9.0      06 Jun 2025 06:26  Mg     2.1     06-06          06-02 Chol 200[H] LDL -- HDL 38[L] Trig 171[H]    A1C:    IMAGING: Reviewed by me.     Neuro-Imaging     MR Head No Cont (06.03.25 @ 16:29)   IMPRESSION:  No evidence of a mass or current evidence of acute ischemia. Chronic   small infarcts and white matter ischemic changes.     (06.01.25 @ 09:08)  IMPRESSION:  CT HEAD: No acute intracranial findings.  CT PERFUSION: No focal perfusion deficit.  CT ANGIOGRAPHY NECK: Right carotid stent appears patent. Short-segment   moderatenarrowing proximal left cervical ICA.  CT ANGIOGRAPHY HEAD: Moderate narrowing left cavernous segment ICA.   Severe narrowing right distal petrous/proximal cavernous segment ICA.   Mild to moderate narrowing right paraclinoid segment ICA. Focal moderate   narrowing right proximal P2 segment. Focal near-complete occlusion right   distal P2 segment.  --  ULTRASOUND     US Duplex Carotid Arteries Complete, Bilateral (06.05.25 @ 13:55)   IMPRESSION:    RIGHT: Right carotid artery stent without significant stenosis. Mild   extrinsic narrowing of the stent secondary to excluded plaque. Elevated   right external carotid artery velocity reflects significant stenosis.    LEFT: Left carotid artery calcified and noncalcified plaque involving the   proximal and mid ICA segments with approximately 50% stenosis, borderline   mild to moderate category. Correlate with recent CTA findings given   degree of plaque.      US Renal (06.03.25 @ 16:17)   Limited study.  Atrophic kidneys without hydronephrosis, likely reflecting medical renal   disease.  Evaluation of the right and left renal parenchyma is markedly limited due   to shadowing artifact.    US Duplex Venous Lower Ext Complete, Bilateral (06.01.25 @ 15:39)  IMPRESSION:  No evidence of deep venous thrombosis in either lower extremity.  --  CARDIOLOGY   TTE W or WO Ultrasound Enhancing Agent (06.01.25 @ 13:50)  CONCLUSIONS:      1. Left ventricular systolic function is normal with an ejection fraction visually estimated at 65 %. There are no regional wall motion abnormalities seen.   2. Mild left ventricular hypertrophy.   3. There is mild (grade 1) left ventricular diastolic dysfunction, with normal left ventricular filling pressure.   4. Normal right ventricular cavity size.   5. Left atrium is normal in size.   6. There is mild posterior calcification of the mitral valve annulus.   7. Estimatedpulmonary artery systolic pressure is 22 mmHg.   8. Fibrocalcific aortic valve sclerosis without stenosis.   9. The interatrial septum appears intact.  10. No pericardial effusion seen.  --  XRAY  Xray Chest 1 View AP/PA. (06.01.25 @ 09:35)   HEART:difficult to access in this projection.  LUNGS: retrocardiac infiltrate..  BONES: degenerative changes                  [facility] Stroke Team  Progress Note     HPI:  Ms. Stephens is a 85 y/o woman with hx of prior stroke in 2022, hx of Right carotid stent > 5 years for asymptomatic carotid disease, PE in 2022 s/p thrombectomy post-covid vaccine, on Apixaban 2.5mg BID and ASA, DM, CKD, Anemia, HLD who presented for transient episode of slurred speech and Rt arm weakness. Last well known last evening prior to presentation, woke up  AM of admission at 7:30AM and noted that she had difficulty raising her right arm and pulling herself up.  No other focal weakness. Her children also noted she was slurring her words. Upon arrival here, no more symptoms were noted.      Denied any prior episode.  Admitted to noncompliance with Apixaban and misses nightly dose regularly.  Follows with hematology/pulm/nephro.  Has known anemia and CKD.     SUBJECTIVE: No events overnight.  No new neurologic complaints.  ROS reported negative unless otherwise noted.    acetaminophen     Tablet .. 650 milliGRAM(s) Oral every 6 hours PRN  apixaban 2.5 milliGRAM(s) Oral every 12 hours  ascorbic acid 500 milliGRAM(s) Oral daily  aspirin enteric coated 81 milliGRAM(s) Oral daily  atenolol  Tablet 100 milliGRAM(s) Oral daily  atorvastatin 80 milliGRAM(s) Oral at bedtime  dextrose 5%. 1000 milliLiter(s) IV Continuous <Continuous>  dextrose 5%. 1000 milliLiter(s) IV Continuous <Continuous>  dextrose 50% Injectable 25 Gram(s) IV Push once  dextrose 50% Injectable 12.5 Gram(s) IV Push once  dextrose 50% Injectable 25 Gram(s) IV Push once  dextrose Oral Gel 15 Gram(s) Oral once PRN  ferrous    sulfate 325 milliGRAM(s) Oral every 8 hours  glucagon  Injectable 1 milliGRAM(s) IntraMuscular once  hydrALAZINE 75 milliGRAM(s) Oral every 8 hours  hydrALAZINE Injectable 10 milliGRAM(s) IV Push every 4 hours PRN  insulin glargine Injectable (LANTUS) 15 Unit(s) SubCutaneous at bedtime  insulin lispro (ADMELOG) corrective regimen sliding scale   SubCutaneous Before meals and at bedtime  insulin lispro Injectable (ADMELOG) 3 Unit(s) SubCutaneous three times a day before meals  iron sucrose IVPB 100 milliGRAM(s) IV Intermittent every 24 hours  labetalol Injectable 10 milliGRAM(s) IV Push every 4 hours PRN  ondansetron Injectable 4 milliGRAM(s) IV Push every 6 hours PRN  polyethylene glycol 3350 17 Gram(s) Oral at bedtime PRN  senna 2 Tablet(s) Oral at bedtime  sodium bicarbonate 650 milliGRAM(s) Oral two times a day      PHYSICAL EXAM:   Vital Signs Last 24 Hrs  T(C): 37 (06 Jun 2025 08:26), Max: 37.4 (06 Jun 2025 00:14)  T(F): 98.6 (06 Jun 2025 08:26), Max: 99.3 (06 Jun 2025 00:14)  HR: 75 (06 Jun 2025 08:26) (69 - 77)  BP: 154/73 (06 Jun 2025 08:26) (132/60 - 171/77)  BP(mean): --  RR: 20 (06 Jun 2025 08:26) (18 - 20)  SpO2: 95% (06 Jun 2025 08:26) (95% - 98%)    Parameters below as of 06 Jun 2025 08:26  Patient On (Oxygen Delivery Method): room air          General: No acute distress.       Detailed Neurologic Exam:    Mental status: The patient is awake and alert and has normal attention span.  The patient is fully oriented in 3 spheres. The patient is able to name objects, follow commands, repeat sentences.  Cranial nerves: Pupils equal and react symmetrically to light. There is no visual field deficit to confrontation. Extraocular motion is full with no nystagmus. There is no ptosis. Facial sensation is intact. Facial musculature is symmetric. Palate elevates symmetrically. Tongue is midline.  Motor: There is normal bulk and tone.  There is no tremor.  Strength is 5/5 in the right arm and leg.   Strength is 5/5 in the left arm and  4+/5 leg (pain limiting).  Sensation: Intact to light touch in 4 extremities  Cerebellar: There is no dysmetria on finger to nose testing.    LABS:                        9.1    8.38  )-----------( 249      ( 06 Jun 2025 06:26 )             29.9    06-06    138  |  104  |  43.0[H]  ----------------------------<  115[H]  4.7   |  20.0[L]  |  2.49[H]    Ca    9.0      06 Jun 2025 06:26  Mg     2.1     06-06 06-02 Chol 200[H] LDL -- HDL 38[L] Trig 171[H]    A1C:    IMAGING: Reviewed by me.     Neuro-Imaging     MR Head No Cont (06.03.25 @ 16:29)   IMPRESSION:  No evidence of a mass or current evidence of acute ischemia. Chronic   small infarcts and white matter ischemic changes.     (06.01.25 @ 09:08)  IMPRESSION:  CT HEAD: No acute intracranial findings.  CT PERFUSION: No focal perfusion deficit.  CT ANGIOGRAPHY NECK: Right carotid stent appears patent. Short-segment   moderatenarrowing proximal left cervical ICA.  CT ANGIOGRAPHY HEAD: Moderate narrowing left cavernous segment ICA.   Severe narrowing right distal petrous/proximal cavernous segment ICA.   Mild to moderate narrowing right paraclinoid segment ICA. Focal moderate   narrowing right proximal P2 segment. Focal near-complete occlusion right   distal P2 segment.  --  ULTRASOUND     US Duplex Carotid Arteries Complete, Bilateral (06.05.25 @ 13:55)   IMPRESSION:    RIGHT: Right carotid artery stent without significant stenosis. Mild   extrinsic narrowing of the stent secondary to excluded plaque. Elevated   right external carotid artery velocity reflects significant stenosis.    LEFT: Left carotid artery calcified and noncalcified plaque involving the   proximal and mid ICA segments with approximately 50% stenosis, borderline   mild to moderate category. Correlate with recent CTA findings given   degree of plaque.      US Renal (06.03.25 @ 16:17)   Limited study.  Atrophic kidneys without hydronephrosis, likely reflecting medical renal   disease.  Evaluation of the right and left renal parenchyma is markedly limited due   to shadowing artifact.    US Duplex Venous Lower Ext Complete, Bilateral (06.01.25 @ 15:39)  IMPRESSION:  No evidence of deep venous thrombosis in either lower extremity.  --  CARDIOLOGY   TTE W or WO Ultrasound Enhancing Agent (06.01.25 @ 13:50)  CONCLUSIONS:      1. Left ventricular systolic function is normal with an ejection fraction visually estimated at 65 %. There are no regional wall motion abnormalities seen.   2. Mild left ventricular hypertrophy.   3. There is mild (grade 1) left ventricular diastolic dysfunction, with normal left ventricular filling pressure.   4. Normal right ventricular cavity size.   5. Left atrium is normal in size.   6. There is mild posterior calcification of the mitral valve annulus.   7. Estimatedpulmonary artery systolic pressure is 22 mmHg.   8. Fibrocalcific aortic valve sclerosis without stenosis.   9. The interatrial septum appears intact.  10. No pericardial effusion seen.  --  XRAY  Xray Chest 1 View AP/PA. (06.01.25 @ 09:35)   HEART:difficult to access in this projection.  LUNGS: retrocardiac infiltrate..  BONES: degenerative changes

## 2025-06-06 NOTE — PROGRESS NOTE ADULT - SUBJECTIVE AND OBJECTIVE BOX
NEPHROLOGY INTERVAL HPI/OVERNIGHT EVENTS:    Examined earlier  No distress    MEDICATIONS  (STANDING):  apixaban 2.5 milliGRAM(s) Oral every 12 hours  ascorbic acid 500 milliGRAM(s) Oral daily  aspirin enteric coated 81 milliGRAM(s) Oral daily  atenolol  Tablet 100 milliGRAM(s) Oral daily  atorvastatin 80 milliGRAM(s) Oral at bedtime  dextrose 5%. 1000 milliLiter(s) (100 mL/Hr) IV Continuous <Continuous>  dextrose 5%. 1000 milliLiter(s) (50 mL/Hr) IV Continuous <Continuous>  dextrose 50% Injectable 25 Gram(s) IV Push once  dextrose 50% Injectable 12.5 Gram(s) IV Push once  dextrose 50% Injectable 25 Gram(s) IV Push once  ferrous    sulfate 325 milliGRAM(s) Oral every 8 hours  glucagon  Injectable 1 milliGRAM(s) IntraMuscular once  hydrALAZINE 75 milliGRAM(s) Oral every 8 hours  insulin glargine Injectable (LANTUS) 15 Unit(s) SubCutaneous at bedtime  insulin lispro (ADMELOG) corrective regimen sliding scale   SubCutaneous Before meals and at bedtime  insulin lispro Injectable (ADMELOG) 3 Unit(s) SubCutaneous three times a day before meals  iron sucrose IVPB 100 milliGRAM(s) IV Intermittent every 24 hours  senna 2 Tablet(s) Oral at bedtime  sodium bicarbonate 650 milliGRAM(s) Oral two times a day    MEDICATIONS  (PRN):  acetaminophen     Tablet .. 650 milliGRAM(s) Oral every 6 hours PRN Temp greater or equal to 38C (100.4F), Moderate Pain (4 - 6)  dextrose Oral Gel 15 Gram(s) Oral once PRN Blood Glucose LESS THAN 70 milliGRAM(s)/deciliter  hydrALAZINE Injectable 10 milliGRAM(s) IV Push every 4 hours   labetalol Injectable 10 milliGRAM(s) IV Push every 4 hours PRN SBP > 190  ondansetron Injectable 4 milliGRAM(s) IV Push every 6 hours PRN Nausea and/or Vomiting  polyethylene glycol 3350 17 Gram(s) Oral at bedtime PRN Constipation      Allergies    No Known Allergies    Intolerances        Vital Signs Last 24 Hrs  T(C): 37 (06 Jun 2025 08:26), Max: 37.4 (06 Jun 2025 00:14)  T(F): 98.6 (06 Jun 2025 08:26), Max: 99.3 (06 Jun 2025 00:14)  HR: 75 (06 Jun 2025 08:26) (72 - 77)  BP: 154/73 (06 Jun 2025 08:26) (132/60 - 169/67)  BP(mean): --  RR: 20 (06 Jun 2025 08:26) (18 - 20)  SpO2: 95% (06 Jun 2025 08:26) (95% - 98%)    Parameters below as of 06 Jun 2025 08:26  Patient On (Oxygen Delivery Method): room air      PHYSICAL EXAM:  GENERAL: NAD  HEAD: NCAT  NECK: Supple, No JVD  NERVOUS SYSTEM:  Alert & Oriented X3  CHEST/LUNG: Clear / EAE . No wheeze   HEART: Regular rate and rhythm; No gallop or rub   ABDOMEN: Soft, Nontender, Nondistended; +BS  EXTREMITIES:  no edema       LABS:                        9.1    8.38  )-----------( 249      ( 06 Jun 2025 06:26 )             29.9     06-06    138  |  104  |  43.0[H]  ----------------------------<  115[H]  4.7   |  20.0[L]  |  2.49[H]    Ca    9.0      06 Jun 2025 06:26  Mg     2.1     06-06        Urinalysis Basic - ( 06 Jun 2025 06:26 )    Color: x / Appearance: x / SG: x / pH: x  Gluc: 115 mg/dL / Ketone: x  / Bili: x / Urobili: x   Blood: x / Protein: x / Nitrite: x   Leuk Esterase: x / RBC: x / WBC x   Sq Epi: x / Non Sq Epi: x / Bacteria: x      Magnesium: 2.1 mg/dL (06-06 @ 06:26)          RADIOLOGY & ADDITIONAL TESTS:

## 2025-06-06 NOTE — PROGRESS NOTE ADULT - ASSESSMENT
INCOMPLETE NOTE *********************************************  85 y/o F with hx of prior stroke in 2022, hx of Right carotid stent > 5 years for asymptomatic carotid disease, PE in 2022 s/p thrombectomy post-covid vaccine, on Eliquis 2.5mg BID and ASA who presents for transient episode of slurred speech and R arm weakness. CT head without acute infarction or hemorrhage. CT ngiogram head/neck shows patent Rt ICA stent, moderate cervical left ICA stenosis, moderate left cavernous ICA stenosis, severe distal right cavernous segment ICA stenosis, mild-moderate right paraclinoid ICA stenosis, focal right proximal P2 segment stenosis with focal to near complete occlusion of the right distal P2 segment.    MR brain without acute infarction or hemorrhage.     Impression: Transient right hemiparesis and dysarthria, concern for left hemispheric TIA. Likely secondary to multifocal extra and intracranial atherosclerotic disease with potential underlying hypercoagulable state given history as noted and reported non adherence with apixaban, anemia, and JENNIFER on CKD with potential encephalopathy.      At this time, would not pursue any carotid intervention and would optimize her medically.     NEURO: TIA  -Neurologically without acute change.   -Continue close monitoring for neurologic deterioration    -Stroke neuro checks q 4 hours    -SBP currently 150-190s mmHg, gradual titration 150-170mmHg for now , over the next 1 week goal of 140-160mmHg with eventual long term age/risk specific relative normotension as tolerated avoiding rapid fluctuations.   -ANTITHROMBOTIC THERAPY: Continues Apixaban 2.5mg BID per home regimen (reported non adhearance- encouraged usage as prescribed) and ASA 81mg daily .   - - started on atorvastatin 80 mg daily  -MRI Brain w/o as noted   -Close neurovascular / Neuro IR follow up for noted atherosclerotic disease.   -Dysphagia screen: passed   -Physical therapy/OT/Speech eval/treatment.   -Stroke education     CARDIOVASCULAR: HTN, HLD  -TTE: as above  -Continuous cardiac monitoring for arrhythmias   -Blood pressure and antiplatelet/anticoagulation plan as above               Heme: Anemia of chronic disease, plt 252  - follow Hb, acute on chronic anemia   -Transfuse accordingly   - outpatient f/u with hematology or sooner if clinically indicated  -No active bleeding noted   -No evidence of DVT in either extremity on LE venous duplex     ENDO: DM with hyperglycemia  - HbA1c: 8.7  - continue regular fingersticks   - continue insulin sliding scale   - monitor glucose levels   - f/u outpatient with endocrinologist (pt reports improved a1c from 11)    PULMONARY:   - protecting airway, saturating well   -Encourage mobility and incentive spirometry as tolerated     RENAL: JENNIFER on CKD IV  - Gentle hydration  - Renal US as noted   -Nephrology follow up   - monitor urine output, maintain adequate hydration    - Na Goal:  135-145  - monitor electrolytes , supplement accordingly.   - Brooke: n/a    ID:   - Afebrile, monitor for si/sx of infection   - monitor temperature curve   - UA , positive LE, TNTC bacteria, cloudy started on Rocephin  -Follow up UCx and sensitivities , cx - reordered     GI/Nutrition:   - continue bowel regimen , pt noted no BM, deferred regimen at this time. Discussed bowel regimen, pt to reconsider taking if no BM today.   -GI ppx as needed     Skin/Musk:  - recurrent evaluations for evidence of skin breakdown     OTHER:  condition and plan of care d/w patient, daughter via telephone as requested, questions and concerns addressed.     DISPOSITION: TABATHA per PT eval once stable and workup is complete. Medical team following for further recommendations.     CORE MEASURES:        Admission NIHSS: 0     Tenecteplase : [] YES [x] NO      LDL/HDL/A1C: 131/38/8.7     Depression Screen- if depression hx and/or present      Statin Therapy: Y     Dysphagia Screen: [x] PASS [] FAIL     Smoking [] YES [x] NO      Afib [] YES [x] NO     Stroke Education [x] YES [] NO      Obtain screening lower extremity venous ultrasound in patients who meet 1 or more of the following criteria as patient is high risk for DVT/PE on admission:   [] History of DVT/PE  []Hypercoagulable states (Factor V Leiden, Cancer, OCP, etc. )  []Prolonged immobility (hemiplegia/hemiparesis/post operative or any other extended immobilization)  [] Transferred from outside facility (Rehab or Long term care)  [] Age </= to 50  [x] stroke      85 y/o F with hx of prior stroke in 2022, hx of Right carotid stent > 5 years for asymptomatic carotid disease, PE in 2022 s/p thrombectomy post-covid vaccine, on Eliquis 2.5mg BID and ASA who presents for transient episode of slurred speech and R arm weakness. CT head without acute infarction or hemorrhage. CT ngiogram head/neck shows patent Rt ICA stent, moderate cervical left ICA stenosis, moderate left cavernous ICA stenosis, severe distal right cavernous segment ICA stenosis, mild-moderate right paraclinoid ICA stenosis, focal right proximal P2 segment stenosis with focal to near complete occlusion of the right distal P2 segment.    MR brain without acute infarction or hemorrhage.     Impression: Transient right hemiparesis and dysarthria, concern for left hemispheric TIA. Likely secondary to multifocal extra and intracranial atherosclerotic disease with potential underlying hypercoagulable state given history as noted and reported non adherence with apixaban, anemia, and JENNIFER on CKD with potential encephalopathy.      At this time, would not pursue any carotid intervention and would optimize her medically.     NEURO: TIA  -Neurologically without acute change.   -Continue close monitoring for neurologic deterioration    -Stroke neuro checks q 4 hours    -SBP currently 150-190s mmHg, gradual titration 150-170mmHg for now , over the next 1 week goal of 140-160mmHg with eventual long term age/risk specific relative normotension as tolerated avoiding rapid fluctuations.   -ANTITHROMBOTIC THERAPY: Continues Apixaban 2.5mg BID per home regimen (reported non adhearance- encouraged usage as prescribed) and ASA 81mg daily .   - - started on atorvastatin 80 mg daily  -MRI Brain w/o as noted   -Carotid US - Right ICA without significant stenosis, Left ICA approximately 50% stenosis  -Close neurovascular / Neuro IR follow up for noted atherosclerotic disease.   -Dysphagia screen: passed   -Physical therapy/OT/Speech eval/treatment.   -Stroke education     CARDIOVASCULAR: HTN, HLD  -TTE: as above  -Continuous cardiac monitoring for arrhythmias   -Blood pressure and antiplatelet/anticoagulation plan as above               Heme: Anemia of chronic disease, plt 249  - follow Hb, acute on chronic anemia   -Transfuse accordingly   - outpatient f/u with hematology or sooner if clinically indicated  -No active bleeding noted   -No evidence of DVT in either extremity on LE venous duplex     ENDO: DM with hyperglycemia  - HbA1c: 8.7  - continue regular fingersticks   - continue insulin sliding scale   - monitor glucose levels   - f/u outpatient with endocrinologist (pt reports improved a1c from 11)    PULMONARY:   - protecting airway, saturating well   - Encourage mobility and incentive spirometry as tolerated     RENAL: JENNIFER on CKD IV  - Gentle hydration  - Renal US as noted   - Nephrology follow up   - monitor urine output, maintain adequate hydration    - Na Goal:  135-145  - monitor electrolytes , supplement accordingly.   - Brooke: n/a    ID:   - Afebrile, monitor for si/sx of infection   - monitor temperature curve   - UA , positive LE, TNTC bacteria, cloudy started on Rocephin - completed       GI/Nutrition:   - continue bowel regimen , pt noted no BM, deferred regimen at this time. Discussed bowel regimen, pt to reconsider taking if no BM today.   -GI ppx as needed     Skin/Musk:  - recurrent evaluations for evidence of skin breakdown     OTHER:  condition and plan of care d/w patient, daughter via telephone as requested, questions and concerns addressed.     DISPOSITION: per Primary team       No further recommendations from the Stroke team   Please call with any questions   Stroke team signing off       CORE MEASURES:        Admission NIHSS: 0     Tenecteplase : [] YES [x] NO      LDL/HDL/A1C: 131/38/8.7     Depression Screen- if depression hx and/or present      Statin Therapy: Y     Dysphagia Screen: [x] PASS [] FAIL     Smoking [] YES [x] NO      Afib [] YES [x] NO     Stroke Education [x] YES [] NO      Obtain screening lower extremity venous ultrasound in patients who meet 1 or more of the following criteria as patient is high risk for DVT/PE on admission:   [] History of DVT/PE  []Hypercoagulable states (Factor V Leiden, Cancer, OCP, etc. )  []Prolonged immobility (hemiplegia/hemiparesis/post operative or any other extended immobilization)  [] Transferred from outside facility (Rehab or Long term care)  [] Age </= to 50  [x] stroke

## 2025-06-07 LAB
ANION GAP SERPL CALC-SCNC: 13 MMOL/L — SIGNIFICANT CHANGE UP (ref 5–17)
APPEARANCE UR: CLEAR — SIGNIFICANT CHANGE UP
BACTERIA # UR AUTO: NEGATIVE /HPF — SIGNIFICANT CHANGE UP
BILIRUB UR-MCNC: NEGATIVE — SIGNIFICANT CHANGE UP
BUN SERPL-MCNC: 42.2 MG/DL — HIGH (ref 8–20)
CALCIUM SERPL-MCNC: 8.3 MG/DL — LOW (ref 8.4–10.5)
CAST: 2 /LPF — SIGNIFICANT CHANGE UP (ref 0–4)
CHLORIDE SERPL-SCNC: 108 MMOL/L — SIGNIFICANT CHANGE UP (ref 96–108)
CO2 SERPL-SCNC: 19 MMOL/L — LOW (ref 22–29)
COLOR SPEC: YELLOW — SIGNIFICANT CHANGE UP
CREAT SERPL-MCNC: 2.35 MG/DL — HIGH (ref 0.5–1.3)
DIFF PNL FLD: NEGATIVE — SIGNIFICANT CHANGE UP
EGFR: 20 ML/MIN/1.73M2 — LOW
EGFR: 20 ML/MIN/1.73M2 — LOW
GLUCOSE BLDC GLUCOMTR-MCNC: 127 MG/DL — HIGH (ref 70–99)
GLUCOSE BLDC GLUCOMTR-MCNC: 193 MG/DL — HIGH (ref 70–99)
GLUCOSE BLDC GLUCOMTR-MCNC: 203 MG/DL — HIGH (ref 70–99)
GLUCOSE BLDC GLUCOMTR-MCNC: 210 MG/DL — HIGH (ref 70–99)
GLUCOSE SERPL-MCNC: 111 MG/DL — HIGH (ref 70–99)
GLUCOSE UR QL: NEGATIVE MG/DL — SIGNIFICANT CHANGE UP
KETONES UR QL: NEGATIVE MG/DL — SIGNIFICANT CHANGE UP
LEUKOCYTE ESTERASE UR-ACNC: ABNORMAL
NITRITE UR-MCNC: NEGATIVE — SIGNIFICANT CHANGE UP
PH UR: 6 — SIGNIFICANT CHANGE UP (ref 5–8)
POTASSIUM SERPL-MCNC: 4.7 MMOL/L — SIGNIFICANT CHANGE UP (ref 3.5–5.3)
POTASSIUM SERPL-SCNC: 4.7 MMOL/L — SIGNIFICANT CHANGE UP (ref 3.5–5.3)
PROT UR-MCNC: 100 MG/DL
RBC CASTS # UR COMP ASSIST: 2 /HPF — SIGNIFICANT CHANGE UP (ref 0–4)
SODIUM SERPL-SCNC: 140 MMOL/L — SIGNIFICANT CHANGE UP (ref 135–145)
SP GR SPEC: 1.02 — SIGNIFICANT CHANGE UP (ref 1–1.03)
SQUAMOUS # UR AUTO: 13 /HPF — HIGH (ref 0–5)
UROBILINOGEN FLD QL: 0.2 MG/DL — SIGNIFICANT CHANGE UP (ref 0.2–1)
WBC UR QL: 15 /HPF — HIGH (ref 0–5)

## 2025-06-07 PROCEDURE — 99232 SBSQ HOSP IP/OBS MODERATE 35: CPT

## 2025-06-07 RX ORDER — EPOETIN ALFA 10000 [IU]/ML
20000 SOLUTION INTRAVENOUS; SUBCUTANEOUS
Refills: 0 | Status: DISCONTINUED | OUTPATIENT
Start: 2025-06-09 | End: 2025-06-10

## 2025-06-07 RX ADMIN — Medication 75 MILLIGRAM(S): at 05:20

## 2025-06-07 RX ADMIN — Medication 75 MILLIGRAM(S): at 22:49

## 2025-06-07 RX ADMIN — Medication 650 MILLIGRAM(S): at 13:04

## 2025-06-07 RX ADMIN — Medication 650 MILLIGRAM(S): at 22:49

## 2025-06-07 RX ADMIN — INSULIN LISPRO 3 UNIT(S): 100 INJECTION, SOLUTION INTRAVENOUS; SUBCUTANEOUS at 17:59

## 2025-06-07 RX ADMIN — INSULIN LISPRO 3 UNIT(S): 100 INJECTION, SOLUTION INTRAVENOUS; SUBCUTANEOUS at 12:59

## 2025-06-07 RX ADMIN — APIXABAN 2.5 MILLIGRAM(S): 2.5 TABLET, FILM COATED ORAL at 05:21

## 2025-06-07 RX ADMIN — APIXABAN 2.5 MILLIGRAM(S): 2.5 TABLET, FILM COATED ORAL at 17:56

## 2025-06-07 RX ADMIN — Medication 325 MILLIGRAM(S): at 22:49

## 2025-06-07 RX ADMIN — IRON SUCROSE 210 MILLIGRAM(S): 20 INJECTION, SOLUTION INTRAVENOUS at 08:37

## 2025-06-07 RX ADMIN — Medication 75 MILLIGRAM(S): at 13:05

## 2025-06-07 RX ADMIN — Medication 325 MILLIGRAM(S): at 05:21

## 2025-06-07 RX ADMIN — Medication 500 MILLIGRAM(S): at 13:00

## 2025-06-07 RX ADMIN — Medication 325 MILLIGRAM(S): at 13:04

## 2025-06-07 RX ADMIN — INSULIN GLARGINE-YFGN 15 UNIT(S): 100 INJECTION, SOLUTION SUBCUTANEOUS at 22:50

## 2025-06-07 RX ADMIN — INSULIN LISPRO 4: 100 INJECTION, SOLUTION INTRAVENOUS; SUBCUTANEOUS at 12:58

## 2025-06-07 RX ADMIN — INSULIN LISPRO 2: 100 INJECTION, SOLUTION INTRAVENOUS; SUBCUTANEOUS at 17:58

## 2025-06-07 RX ADMIN — Medication 81 MILLIGRAM(S): at 12:59

## 2025-06-07 RX ADMIN — LISINOPRIL 100 MILLIGRAM(S): 30 TABLET ORAL at 05:20

## 2025-06-07 RX ADMIN — ATORVASTATIN CALCIUM 80 MILLIGRAM(S): 80 TABLET, FILM COATED ORAL at 22:49

## 2025-06-07 RX ADMIN — Medication 650 MILLIGRAM(S): at 05:21

## 2025-06-07 RX ADMIN — INSULIN LISPRO 4: 100 INJECTION, SOLUTION INTRAVENOUS; SUBCUTANEOUS at 22:54

## 2025-06-07 RX ADMIN — Medication 2 TABLET(S): at 22:49

## 2025-06-07 RX ADMIN — INSULIN LISPRO 3 UNIT(S): 100 INJECTION, SOLUTION INTRAVENOUS; SUBCUTANEOUS at 08:36

## 2025-06-07 NOTE — PROGRESS NOTE ADULT - ASSESSMENT
83 y/o F w h/o prior cva in 2022, hx of Right carotid stent > 5 years for asymptomatic carotid disease, PE in 2022 s/p thrombectomy post-covid vaccine, on Eliquis 2.5mg BID and ASA, DM, CKD, Anemia, HLD who presents for transient episode of slurred speech and R arm weakness    JENNIFER on CKD III - improving   S/P CTA 6/1/25   Suspect episode RCN / ATN   Serum Cr 2.6--> 2.4 --> 2.3  Prior Cr 1.8  Would cont to hold Metformin/ Enalapril   No hydro noted on US 6/3/25     HTN - Better w/ med adjustments     MA - cont oral Bicarb inc to TID    Anemia -  s/p transfusion 6/4  Noted low iron stores --->  cont  Venofer x 3 until completion  B 12 and folate OK   BP better will add Retacrit Q wk    Monitor labs will follow

## 2025-06-07 NOTE — PROGRESS NOTE ADULT - SUBJECTIVE AND OBJECTIVE BOX
NEPHROLOGY INTERVAL HPI/OVERNIGHT EVENTS:    Examined earlier  No distress  Frail lethargic    MEDICATIONS  (STANDING):  apixaban 2.5 milliGRAM(s) Oral every 12 hours  ascorbic acid 500 milliGRAM(s) Oral daily  aspirin enteric coated 81 milliGRAM(s) Oral daily  atenolol  Tablet 100 milliGRAM(s) Oral daily  atorvastatin 80 milliGRAM(s) Oral at bedtime  dextrose 5%. 1000 milliLiter(s) (100 mL/Hr) IV Continuous <Continuous>  dextrose 5%. 1000 milliLiter(s) (50 mL/Hr) IV Continuous <Continuous>  dextrose 50% Injectable 25 Gram(s) IV Push once  dextrose 50% Injectable 12.5 Gram(s) IV Push once  dextrose 50% Injectable 25 Gram(s) IV Push once  ferrous    sulfate 325 milliGRAM(s) Oral every 8 hours  glucagon  Injectable 1 milliGRAM(s) IntraMuscular once  hydrALAZINE 75 milliGRAM(s) Oral every 8 hours  insulin glargine Injectable (LANTUS) 15 Unit(s) SubCutaneous at bedtime  insulin lispro (ADMELOG) corrective regimen sliding scale   SubCutaneous Before meals and at bedtime  insulin lispro Injectable (ADMELOG) 3 Unit(s) SubCutaneous three times a day before meals  senna 2 Tablet(s) Oral at bedtime  sodium bicarbonate 650 milliGRAM(s) Oral three times a day    MEDICATIONS  (PRN):  acetaminophen     Tablet .. 650 milliGRAM(s) Oral every 6 hours PRN Temp greater or equal to 38C (100.4F), Moderate Pain (4 - 6)  dextrose Oral Gel 15 Gram(s) Oral once PRN Blood Glucose LESS THAN 70 milliGRAM(s)/deciliter  hydrALAZINE Injectable 10 milliGRAM(s) IV Push every 4 hours   labetalol Injectable 10 milliGRAM(s) IV Push every 4 hours PRN SBP > 190  ondansetron Injectable 4 milliGRAM(s) IV Push every 6 hours PRN Nausea and/or Vomiting  polyethylene glycol 3350 17 Gram(s) Oral at bedtime PRN Constipation      Allergies    No Known Allergies    Intolerances        Vital Signs Last 24 Hrs  T(C): 37 (07 Jun 2025 08:25), Max: 37.5 (07 Jun 2025 04:23)  T(F): 98.6 (07 Jun 2025 08:25), Max: 99.5 (07 Jun 2025 04:23)  HR: 72 (07 Jun 2025 08:25) (72 - 76)  BP: 138/62 (07 Jun 2025 08:25) (136/72 - 184/68)  BP(mean): 94 (06 Jun 2025 11:49) (94 - 94)  RR: 18 (07 Jun 2025 08:25) (18 - 19)  SpO2: 98% (07 Jun 2025 08:25) (94% - 98%)    Parameters below as of 07 Jun 2025 08:25  Patient On (Oxygen Delivery Method): room air      PHYSICAL EXAM:  GENERAL: NAD  HEAD: NCAT  NECK: Supple, No JVD  NERVOUS SYSTEM:  Awake lethargic  CHEST/LUNG: Clear / EAE . No wheeze   HEART: Regular rate and rhythm; No gallop or rub   ABDOMEN: Soft, Nontender, Nondistended; +BS  EXTREMITIES:  no edema       LABS:                        9.1    8.38  )-----------( 249      ( 06 Jun 2025 06:26 )             29.9     06-07    140  |  108  |  42.2[H]  ----------------------------<  111[H]  4.7   |  19.0[L]  |  2.35[H]    Ca    8.3[L]      07 Jun 2025 07:41  Mg     2.1     06-06        Urinalysis Basic - ( 07 Jun 2025 07:41 )    Color: x / Appearance: x / SG: x / pH: x  Gluc: 111 mg/dL / Ketone: x  / Bili: x / Urobili: x   Blood: x / Protein: x / Nitrite: x   Leuk Esterase: x / RBC: x / WBC x   Sq Epi: x / Non Sq Epi: x / Bacteria: x              RADIOLOGY & ADDITIONAL TESTS:

## 2025-06-07 NOTE — PROGRESS NOTE ADULT - SUBJECTIVE AND OBJECTIVE BOX
Transient cerebral ischemia    CC: slurred speech.     HPI:  The patient is a 85 y/o F with hx of prior stroke in 2022, hx of Right carotid stent > 5 years for asymptomatic carotid disease, PE in 2022 s/p thrombectomy post-covid vaccine, on Eliquis 2.5mg BID and ASA, DM, CKD, Anemia, HLD who presents for transient episode of slurred speech and R arm weakness. Last well known last evening, woke up this AM at 7:30AM and noted that she had difficulty raising her right arm and pulling herself up.  No other focal weakness. Her children also noted she was slurring her words. Upon arrival here, no more symptoms.      Denies any prior episode.  Admits to noncompliance with Eliquis and misses nightly dose regularly.  Follows with hematology/pulm/nephro.  Has known anemia and CKD. (01 Jun 2025 13:10)    Interval History:  Patient was seen and examined at bedside around 9:45 am.  Feels better.   No active complaints.     ROS:  As per interval history otherwise unremarkable.    PHYSICAL EXAM:  Vital Signs   T(C): 36.9 (07 Jun 2025 13:06), Max: 37.5 (07 Jun 2025 04:23)  T(F): 98.5 (07 Jun 2025 13:06), Max: 99.5 (07 Jun 2025 04:23)  HR: 68 (07 Jun 2025 13:06) (68 - 76)  BP: 152/69 (07 Jun 2025 13:06) (136/72 - 184/68)  RR: 18 (07 Jun 2025 13:06) (18 - 18)  SpO2: 96% (07 Jun 2025 13:06) (95% - 98%)  Parameters below as of 07 Jun 2025 13:06  Patient On (Oxygen Delivery Method): room air  General: Elderly female lying in bed comfortably. No acute distress  HEENT: EOMI. Clear conjunctivae. Moist mucus membrane  Neck: Supple.   Chest: Good air entry. No wheezing, rales or rhonchi.   Heart: Normal S1 & S2. RRR.   Abdomen: Non distended. Soft. Non-tender. + BS  Ext: No pedal edema. No calf tenderness   Neuro: Awake and alert. No focal deficit. Speech clear.   Skin: Warm and Dry  Psychiatry: Normal mood and affect    I&O's Summary    06 Jun 2025 07:01  -  07 Jun 2025 07:00  --------------------------------------------------------  IN: 120 mL / OUT: 100 mL / NET: 20 mL    LABS:  CAPILLARY BLOOD GLUCOSE  POCT Blood Glucose.: 203 mg/dL (07 Jun 2025 12:57)  POCT Blood Glucose.: 127 mg/dL (07 Jun 2025 08:31)  POCT Blood Glucose.: 141 mg/dL (06 Jun 2025 22:17)  POCT Blood Glucose.: 194 mg/dL (06 Jun 2025 17:40)                      9.1    8.38  )-----------( 249      ( 06 Jun 2025 06:26 )             29.9     06-07    140  |  108  |  42.2[H]  ----------------------------<  111[H]  4.7   |  19.0[L]  |  2.35[H]    Ca    8.3[L]      07 Jun 2025 07:41  Mg     2.1     06-06    RADIOLOGY & ADDITIONAL STUDIES:  Reviewed     MEDICATIONS  (STANDING):  apixaban 2.5 milliGRAM(s) Oral every 12 hours  ascorbic acid 500 milliGRAM(s) Oral daily  aspirin enteric coated 81 milliGRAM(s) Oral daily  atenolol  Tablet 100 milliGRAM(s) Oral daily  atorvastatin 80 milliGRAM(s) Oral at bedtime  dextrose 5%. 1000 milliLiter(s) (100 mL/Hr) IV Continuous <Continuous>  dextrose 5%. 1000 milliLiter(s) (50 mL/Hr) IV Continuous <Continuous>  dextrose 50% Injectable 25 Gram(s) IV Push once  dextrose 50% Injectable 12.5 Gram(s) IV Push once  dextrose 50% Injectable 25 Gram(s) IV Push once  ferrous    sulfate 325 milliGRAM(s) Oral every 8 hours  glucagon  Injectable 1 milliGRAM(s) IntraMuscular once  hydrALAZINE 75 milliGRAM(s) Oral every 8 hours  insulin glargine Injectable (LANTUS) 15 Unit(s) SubCutaneous at bedtime  insulin lispro (ADMELOG) corrective regimen sliding scale   SubCutaneous Before meals and at bedtime  insulin lispro Injectable (ADMELOG) 3 Unit(s) SubCutaneous three times a day before meals  senna 2 Tablet(s) Oral at bedtime  sodium bicarbonate 650 milliGRAM(s) Oral three times a day    MEDICATIONS  (PRN):  acetaminophen     Tablet .. 650 milliGRAM(s) Oral every 6 hours PRN Temp greater or equal to 38C (100.4F), Moderate Pain (4 - 6)  dextrose Oral Gel 15 Gram(s) Oral once PRN Blood Glucose LESS THAN 70 milliGRAM(s)/deciliter  hydrALAZINE Injectable 10 milliGRAM(s) IV Push every 4 hours   labetalol Injectable 10 milliGRAM(s) IV Push every 4 hours PRN SBP > 190  ondansetron Injectable 4 milliGRAM(s) IV Push every 6 hours PRN Nausea and/or Vomiting  polyethylene glycol 3350 17 Gram(s) Oral at bedtime PRN Constipation

## 2025-06-07 NOTE — PROGRESS NOTE ADULT - ASSESSMENT
83 y/o F with hx of prior stroke in 2022, hx of Right carotid stent > 5 years for asymptomatic carotid disease, PE in 2022 s/p thrombectomy post-covid vaccine, on Eliquis 2.5mg BID and ASA, DM, CKD, Anemia, HLD who presented for transient episode of slurred speech and R arm weakness -- she had difficulty raising her right arm and pulling herself up.  No other focal weakness. Her children also noted she was slurring her words. Upon arrival here, no more symptoms, denied any prior episodes.  Admits to noncompliance with Eliquis and misses nightly dose regularly.  Follows with hematology/pulm/nephro.  Has known anemia and CKD, she had Imaging done in the ED showed CT HEAD: No acute intracranial findings, CT PERFUSION: No focal perfusion deficit, CT ANGIOGRAPHY NECK: Right carotid stent appears patent. Short-segment moderate narrowing proximal left cervical ICA.  CT ANGIOGRAPHY HEAD: Moderate narrowing left cavernous segment ICA. Severe narrowing right distal petrous/proximal cavernous segment ICA. Mild to moderate narrowing right paraclinoid segment ICA. Focal moderate narrowing right proximal P2 segment. Focal near-complete occlusion right distal P2 segment, patient is admitted under stroke team and transferred to Medicine on 6/4/25.    TIA   History of CVA / Carotid Stenosis   MRI with no acute stroke   Stroke neuro checks q 8 hours    Continues Eliquis 2.5mg BID and ASA 81mg daily  Continue Atorvastatin 80 mg daily  Neuro recs noted     JENNIFER on CKD 3  HCTZ on hold  Avoid nephrotoxic medications   Continue Sodium Bicarb  Nephro recs appreciated     R/O UTI  Abnormal UA on 6/2  s/p 2 doses of Rocephin   Repeat UA pending from 6/5 (discussed with RN)   Urine culture pending     DM 2  A1c 8.7  Hold Glipizide and Metformin  Accu checks and ISS  Continue Lantus 15 units and Premeal Admelog 3 units     HTN  Continue Atenolol  Increased Hydralazine to 75 mg q 8 hours  Hold HCTZ due to JENNIFER    HLD  Continue Lipitor     Acute on Chronic Anemia  s/p 1 PRBC  Continue Venofer   Monitor     DVT Prophylaxis -- Patient is on Eliquis    Dispo: Home likely in 24 hours if renal function continues to improve.

## 2025-06-08 LAB
ANION GAP SERPL CALC-SCNC: 13 MMOL/L — SIGNIFICANT CHANGE UP (ref 5–17)
BUN SERPL-MCNC: 41.6 MG/DL — HIGH (ref 8–20)
CALCIUM SERPL-MCNC: 8.4 MG/DL — SIGNIFICANT CHANGE UP (ref 8.4–10.5)
CHLORIDE SERPL-SCNC: 106 MMOL/L — SIGNIFICANT CHANGE UP (ref 96–108)
CO2 SERPL-SCNC: 19 MMOL/L — LOW (ref 22–29)
CREAT SERPL-MCNC: 2.23 MG/DL — HIGH (ref 0.5–1.3)
EGFR: 21 ML/MIN/1.73M2 — LOW
EGFR: 21 ML/MIN/1.73M2 — LOW
GLUCOSE BLDC GLUCOMTR-MCNC: 122 MG/DL — HIGH (ref 70–99)
GLUCOSE BLDC GLUCOMTR-MCNC: 187 MG/DL — HIGH (ref 70–99)
GLUCOSE BLDC GLUCOMTR-MCNC: 206 MG/DL — HIGH (ref 70–99)
GLUCOSE BLDC GLUCOMTR-MCNC: 231 MG/DL — HIGH (ref 70–99)
GLUCOSE SERPL-MCNC: 100 MG/DL — HIGH (ref 70–99)
POTASSIUM SERPL-MCNC: 4.4 MMOL/L — SIGNIFICANT CHANGE UP (ref 3.5–5.3)
POTASSIUM SERPL-SCNC: 4.4 MMOL/L — SIGNIFICANT CHANGE UP (ref 3.5–5.3)
SODIUM SERPL-SCNC: 138 MMOL/L — SIGNIFICANT CHANGE UP (ref 135–145)

## 2025-06-08 PROCEDURE — 99232 SBSQ HOSP IP/OBS MODERATE 35: CPT

## 2025-06-08 RX ORDER — CEFTRIAXONE 500 MG/1
1000 INJECTION, POWDER, FOR SOLUTION INTRAMUSCULAR; INTRAVENOUS EVERY 24 HOURS
Refills: 0 | Status: COMPLETED | OUTPATIENT
Start: 2025-06-08 | End: 2025-06-10

## 2025-06-08 RX ADMIN — CEFTRIAXONE 1000 MILLIGRAM(S): 500 INJECTION, POWDER, FOR SOLUTION INTRAMUSCULAR; INTRAVENOUS at 11:50

## 2025-06-08 RX ADMIN — Medication 325 MILLIGRAM(S): at 21:08

## 2025-06-08 RX ADMIN — Medication 81 MILLIGRAM(S): at 11:47

## 2025-06-08 RX ADMIN — INSULIN LISPRO 3 UNIT(S): 100 INJECTION, SOLUTION INTRAVENOUS; SUBCUTANEOUS at 11:50

## 2025-06-08 RX ADMIN — Medication 100 MILLIGRAM(S): at 15:01

## 2025-06-08 RX ADMIN — Medication 325 MILLIGRAM(S): at 06:29

## 2025-06-08 RX ADMIN — Medication 500 MILLIGRAM(S): at 11:48

## 2025-06-08 RX ADMIN — LISINOPRIL 100 MILLIGRAM(S): 30 TABLET ORAL at 06:29

## 2025-06-08 RX ADMIN — INSULIN GLARGINE-YFGN 15 UNIT(S): 100 INJECTION, SOLUTION SUBCUTANEOUS at 21:54

## 2025-06-08 RX ADMIN — APIXABAN 2.5 MILLIGRAM(S): 2.5 TABLET, FILM COATED ORAL at 16:22

## 2025-06-08 RX ADMIN — Medication 75 MILLIGRAM(S): at 06:28

## 2025-06-08 RX ADMIN — INSULIN LISPRO 3 UNIT(S): 100 INJECTION, SOLUTION INTRAVENOUS; SUBCUTANEOUS at 16:22

## 2025-06-08 RX ADMIN — APIXABAN 2.5 MILLIGRAM(S): 2.5 TABLET, FILM COATED ORAL at 06:29

## 2025-06-08 RX ADMIN — INSULIN LISPRO 4: 100 INJECTION, SOLUTION INTRAVENOUS; SUBCUTANEOUS at 21:54

## 2025-06-08 RX ADMIN — ATORVASTATIN CALCIUM 80 MILLIGRAM(S): 80 TABLET, FILM COATED ORAL at 21:09

## 2025-06-08 RX ADMIN — INSULIN LISPRO 4: 100 INJECTION, SOLUTION INTRAVENOUS; SUBCUTANEOUS at 16:22

## 2025-06-08 RX ADMIN — Medication 650 MILLIGRAM(S): at 15:01

## 2025-06-08 RX ADMIN — Medication 100 MILLIGRAM(S): at 21:09

## 2025-06-08 RX ADMIN — Medication 650 MILLIGRAM(S): at 06:29

## 2025-06-08 RX ADMIN — INSULIN LISPRO 2: 100 INJECTION, SOLUTION INTRAVENOUS; SUBCUTANEOUS at 11:50

## 2025-06-08 RX ADMIN — Medication 650 MILLIGRAM(S): at 21:08

## 2025-06-08 RX ADMIN — Medication 325 MILLIGRAM(S): at 15:01

## 2025-06-08 NOTE — PROGRESS NOTE ADULT - SUBJECTIVE AND OBJECTIVE BOX
NEPHROLOGY INTERVAL HPI/OVERNIGHT EVENTS:    Examined earlier  No distress  Elderly  Frail lethargic    MEDICATIONS  (STANDING):  apixaban 2.5 milliGRAM(s) Oral every 12 hours  ascorbic acid 500 milliGRAM(s) Oral daily  aspirin enteric coated 81 milliGRAM(s) Oral daily  atenolol  Tablet 100 milliGRAM(s) Oral daily  atorvastatin 80 milliGRAM(s) Oral at bedtime  dextrose 5%. 1000 milliLiter(s) (50 mL/Hr) IV Continuous <Continuous>  dextrose 5%. 1000 milliLiter(s) (100 mL/Hr) IV Continuous <Continuous>  dextrose 50% Injectable 25 Gram(s) IV Push once  dextrose 50% Injectable 12.5 Gram(s) IV Push once  dextrose 50% Injectable 25 Gram(s) IV Push once  ferrous    sulfate 325 milliGRAM(s) Oral every 8 hours  glucagon  Injectable 1 milliGRAM(s) IntraMuscular once  hydrALAZINE 75 milliGRAM(s) Oral every 8 hours  insulin glargine Injectable (LANTUS) 15 Unit(s) SubCutaneous at bedtime  insulin lispro (ADMELOG) corrective regimen sliding scale   SubCutaneous Before meals and at bedtime  insulin lispro Injectable (ADMELOG) 3 Unit(s) SubCutaneous three times a day before meals  senna 2 Tablet(s) Oral at bedtime  sodium bicarbonate 650 milliGRAM(s) Oral three times a day    MEDICATIONS  (PRN):  acetaminophen     Tablet .. 650 milliGRAM(s) Oral every 6 hours PRN Temp greater or equal to 38C (100.4F), Moderate Pain (4 - 6)  dextrose Oral Gel 15 Gram(s) Oral once PRN Blood Glucose LESS THAN 70 milliGRAM(s)/deciliter  hydrALAZINE Injectable 10 milliGRAM(s) IV Push every 4 hours   labetalol Injectable 10 milliGRAM(s) IV Push every 4 hours PRN SBP > 190  ondansetron Injectable 4 milliGRAM(s) IV Push every 6 hours PRN Nausea and/or Vomiting  polyethylene glycol 3350 17 Gram(s) Oral at bedtime PRN Constipation      Allergies    No Known Allergies    Intolerances        Vital Signs Last 24 Hrs  T(C): 36.9 (08 Jun 2025 07:57), Max: 37 (08 Jun 2025 04:22)  T(F): 98.4 (08 Jun 2025 07:57), Max: 98.6 (08 Jun 2025 04:22)  HR: 68 (08 Jun 2025 07:57) (68 - 80)  BP: 155/70 (08 Jun 2025 07:57) (129/61 - 163/57)  BP(mean): --  RR: 19 (08 Jun 2025 07:57) (16 - 19)  SpO2: 97% (08 Jun 2025 07:57) (95% - 98%)    Parameters below as of 08 Jun 2025 07:57  Patient On (Oxygen Delivery Method): room air    PHYSICAL EXAM:  GENERAL: NAD  HEAD: NCAT  NECK: Supple, No JVD  NERVOUS SYSTEM:  Awake lethargic  CHEST/LUNG: Clear / EAE . No wheeze   HEART: Regular rate and rhythm; No gallop or rub   ABDOMEN: Soft, Nontender, Nondistended; +BS  EXTREMITIES:  no edema     LABS:    06-08    138  |  106  |  41.6[H]  ----------------------------<  100[H]  4.4   |  19.0[L]  |  2.23[H]    Ca    8.4      08 Jun 2025 06:15        Urinalysis Basic - ( 08 Jun 2025 06:15 )    Color: x / Appearance: x / SG: x / pH: x  Gluc: 100 mg/dL / Ketone: x  / Bili: x / Urobili: x   Blood: x / Protein: x / Nitrite: x   Leuk Esterase: x / RBC: x / WBC x   Sq Epi: x / Non Sq Epi: x / Bacteria: x              RADIOLOGY & ADDITIONAL TESTS:

## 2025-06-08 NOTE — PROGRESS NOTE ADULT - ASSESSMENT
85 y/o F with hx of prior stroke in 2022, hx of Right carotid stent > 5 years for asymptomatic carotid disease, PE in 2022 s/p thrombectomy post-covid vaccine, on Eliquis 2.5mg BID and ASA, DM, CKD, Anemia, HLD who presented for transient episode of slurred speech and R arm weakness -- she had difficulty raising her right arm and pulling herself up.  No other focal weakness. Her children also noted she was slurring her words. Upon arrival here, no more symptoms, denied any prior episodes.  Admits to noncompliance with Eliquis and misses nightly dose regularly.  Follows with hematology/pulm/nephro.  Has known anemia and CKD, she had Imaging done in the ED showed CT HEAD: No acute intracranial findings, CT PERFUSION: No focal perfusion deficit, CT ANGIOGRAPHY NECK: Right carotid stent appears patent. Short-segment moderate narrowing proximal left cervical ICA.  CT ANGIOGRAPHY HEAD: Moderate narrowing left cavernous segment ICA. Severe narrowing right distal petrous/proximal cavernous segment ICA. Mild to moderate narrowing right paraclinoid segment ICA. Focal moderate narrowing right proximal P2 segment. Focal near-complete occlusion right distal P2 segment, patient is admitted under stroke team and transferred to Medicine on 6/4/25.    TIA   History of CVA / Carotid Stenosis   MRI with no acute stroke   Stroke neuro checks q 8 hours    Continues Eliquis 2.5mg BID and ASA 81mg daily  Continue Atorvastatin 80 mg daily  Neuro recs noted     JENNIFER on CKD 3  HCTZ on hold  Avoid nephrotoxic medications   Continue Sodium Bicarb  Nephro recs appreciated     UTI  Abnormal UA on 6/2  s/p 2 doses of Rocephin. Restarted today.   Urine culture pending     DM 2  A1c 8.7  Hold Glipizide and Metformin  Accu checks and ISS  Continue Lantus 15 units and Premeal Admelog 3 units     HTN  Continue Atenolol  Increase Hydralazine to 100 mg q 8 hours  Hold HCTZ due to JENNIFER    HLD  Continue Lipitor     Acute on Chronic Anemia  s/p 1 PRBC  Continue Venofer   Monitor     DVT Prophylaxis -- Patient is on Eliquis    Dispo: Home likely in 24 hours if renal function continues to improve and if urine culture is resulted.

## 2025-06-08 NOTE — PROGRESS NOTE ADULT - ASSESSMENT
85 y/o F w h/o prior cva in 2022, hx of Right carotid stent > 5 years for asymptomatic carotid disease, PE in 2022 s/p thrombectomy post-covid vaccine, on Eliquis 2.5mg BID and ASA, DM, CKD, Anemia, HLD who presents for transient episode of slurred speech and R arm weakness    JENNIFER on CKD III - improving daily  S/P CTA 6/1/25   Suspect episode RCN / ATN   Serum Cr 2.6--> 2.4 --> 2.3 --> 2.2  Prior Cr 1.8  Would cont to hold Metformin/ Enalapril   No hydro noted on US 6/3/25     HTN - Better w/ med adjustments     MA - cont oral Bicarb inc to TID    Anemia -  s/p transfusion 6/4  Noted low iron stores --->  cont  Venofer x 3 until completion  B 12 and folate OK   Cont Retacrit Q wk    Monitor labs will follow

## 2025-06-08 NOTE — PROGRESS NOTE ADULT - SUBJECTIVE AND OBJECTIVE BOX
Transient cerebral ischemia    CC: slurred speech.     HPI:  The patient is a 83 y/o F with hx of prior stroke in 2022, hx of Right carotid stent > 5 years for asymptomatic carotid disease, PE in 2022 s/p thrombectomy post-covid vaccine, on Eliquis 2.5mg BID and ASA, DM, CKD, Anemia, HLD who presents for transient episode of slurred speech and R arm weakness. Last well known last evening, woke up this AM at 7:30AM and noted that she had difficulty raising her right arm and pulling herself up.  No other focal weakness. Her children also noted she was slurring her words. Upon arrival here, no more symptoms.      Denies any prior episode.  Admits to noncompliance with Eliquis and misses nightly dose regularly.  Follows with hematology/pulm/nephro.  Has known anemia and CKD. (01 Jun 2025 13:10)    Interval History:  Patient was seen and examined at bedside around 12 noon.  Doing well.  No active complaints.     ROS:  As per interval history otherwise unremarkable.    PHYSICAL EXAM:  Vital Signs   T(C): 36.9 (08 Jun 2025 07:57), Max: 37 (08 Jun 2025 04:22)  T(F): 98.4 (08 Jun 2025 07:57), Max: 98.6 (08 Jun 2025 04:22)  HR: 68 (08 Jun 2025 07:57) (68 - 80)  BP: 155/70 (08 Jun 2025 07:57) (129/61 - 163/57)  RR: 19 (08 Jun 2025 07:57) (16 - 19)  SpO2: 97% (08 Jun 2025 07:57) (95% - 98%)  Parameters below as of 08 Jun 2025 07:57  Patient On (Oxygen Delivery Method): room air  General: Elderly female lying in bed comfortably. No acute distress  HEENT: EOMI. Clear conjunctivae. Moist mucus membrane  Neck: Supple.   Chest: Good air entry. No wheezing, rales or rhonchi.   Heart: Normal S1 & S2. RRR.   Abdomen: Non distended. Soft. Non-tender. + BS  Ext: No pedal edema. No calf tenderness   Neuro: Awake and alert. No focal deficit. Speech clear.   Skin: Warm and Dry  Psychiatry: Normal mood and affect    I&O's Summary    07 Jun 2025 07:01  -  08 Jun 2025 07:00  --------------------------------------------------------  IN: 240 mL / OUT: 800 mL / NET: -560 mL    LABS:  CAPILLARY BLOOD GLUCOSE  POCT Blood Glucose.: 187 mg/dL (08 Jun 2025 11:24)  POCT Blood Glucose.: 122 mg/dL (08 Jun 2025 08:22)  POCT Blood Glucose.: 210 mg/dL (07 Jun 2025 22:48)  POCT Blood Glucose.: 193 mg/dL (07 Jun 2025 17:58)  06-08    138  |  106  |  41.6[H]  ----------------------------<  100[H]  4.4   |  19.0[L]  |  2.23[H]    Ca    8.4      08 Jun 2025 06:15    RADIOLOGY & ADDITIONAL STUDIES:  Reviewed     MEDICATIONS  (STANDING):  apixaban 2.5 milliGRAM(s) Oral every 12 hours  ascorbic acid 500 milliGRAM(s) Oral daily  aspirin enteric coated 81 milliGRAM(s) Oral daily  atenolol  Tablet 100 milliGRAM(s) Oral daily  atorvastatin 80 milliGRAM(s) Oral at bedtime  cefTRIAXone Injectable. 1000 milliGRAM(s) IV Push every 24 hours  dextrose 5%. 1000 milliLiter(s) (50 mL/Hr) IV Continuous <Continuous>  dextrose 5%. 1000 milliLiter(s) (100 mL/Hr) IV Continuous <Continuous>  dextrose 50% Injectable 25 Gram(s) IV Push once  dextrose 50% Injectable 12.5 Gram(s) IV Push once  dextrose 50% Injectable 25 Gram(s) IV Push once  ferrous    sulfate 325 milliGRAM(s) Oral every 8 hours  glucagon  Injectable 1 milliGRAM(s) IntraMuscular once  hydrALAZINE 75 milliGRAM(s) Oral every 8 hours  insulin glargine Injectable (LANTUS) 15 Unit(s) SubCutaneous at bedtime  insulin lispro (ADMELOG) corrective regimen sliding scale   SubCutaneous Before meals and at bedtime  insulin lispro Injectable (ADMELOG) 3 Unit(s) SubCutaneous three times a day before meals  senna 2 Tablet(s) Oral at bedtime  sodium bicarbonate 650 milliGRAM(s) Oral three times a day    MEDICATIONS  (PRN):  acetaminophen     Tablet .. 650 milliGRAM(s) Oral every 6 hours PRN Temp greater or equal to 38C (100.4F), Moderate Pain (4 - 6)  dextrose Oral Gel 15 Gram(s) Oral once PRN Blood Glucose LESS THAN 70 milliGRAM(s)/deciliter  hydrALAZINE Injectable 10 milliGRAM(s) IV Push every 4 hours   labetalol Injectable 10 milliGRAM(s) IV Push every 4 hours PRN SBP > 190  ondansetron Injectable 4 milliGRAM(s) IV Push every 6 hours PRN Nausea and/or Vomiting  polyethylene glycol 3350 17 Gram(s) Oral at bedtime PRN Constipation

## 2025-06-09 LAB
ANION GAP SERPL CALC-SCNC: 13 MMOL/L — SIGNIFICANT CHANGE UP (ref 5–17)
APPEARANCE UR: CLEAR — SIGNIFICANT CHANGE UP
BACTERIA # UR AUTO: NEGATIVE /HPF — SIGNIFICANT CHANGE UP
BILIRUB UR-MCNC: NEGATIVE — SIGNIFICANT CHANGE UP
BUN SERPL-MCNC: 43.9 MG/DL — HIGH (ref 8–20)
CALCIUM SERPL-MCNC: 8.2 MG/DL — LOW (ref 8.4–10.5)
CAST: 0 /LPF — SIGNIFICANT CHANGE UP (ref 0–4)
CHLORIDE SERPL-SCNC: 106 MMOL/L — SIGNIFICANT CHANGE UP (ref 96–108)
CO2 SERPL-SCNC: 19 MMOL/L — LOW (ref 22–29)
COLOR SPEC: YELLOW — SIGNIFICANT CHANGE UP
CREAT SERPL-MCNC: 2.14 MG/DL — HIGH (ref 0.5–1.3)
DIFF PNL FLD: NEGATIVE — SIGNIFICANT CHANGE UP
EGFR: 22 ML/MIN/1.73M2 — LOW
EGFR: 22 ML/MIN/1.73M2 — LOW
GLUCOSE BLDC GLUCOMTR-MCNC: 130 MG/DL — HIGH (ref 70–99)
GLUCOSE BLDC GLUCOMTR-MCNC: 178 MG/DL — HIGH (ref 70–99)
GLUCOSE BLDC GLUCOMTR-MCNC: 190 MG/DL — HIGH (ref 70–99)
GLUCOSE BLDC GLUCOMTR-MCNC: 289 MG/DL — HIGH (ref 70–99)
GLUCOSE SERPL-MCNC: 110 MG/DL — HIGH (ref 70–99)
GLUCOSE UR QL: NEGATIVE MG/DL — SIGNIFICANT CHANGE UP
KETONES UR QL: NEGATIVE MG/DL — SIGNIFICANT CHANGE UP
LEUKOCYTE ESTERASE UR-ACNC: ABNORMAL
NITRITE UR-MCNC: NEGATIVE — SIGNIFICANT CHANGE UP
PH UR: 6 — SIGNIFICANT CHANGE UP (ref 5–8)
POTASSIUM SERPL-MCNC: 4.8 MMOL/L — SIGNIFICANT CHANGE UP (ref 3.5–5.3)
POTASSIUM SERPL-SCNC: 4.8 MMOL/L — SIGNIFICANT CHANGE UP (ref 3.5–5.3)
PROT UR-MCNC: 100 MG/DL
RBC CASTS # UR COMP ASSIST: 6 /HPF — HIGH (ref 0–4)
SODIUM SERPL-SCNC: 138 MMOL/L — SIGNIFICANT CHANGE UP (ref 135–145)
SP GR SPEC: 1.01 — SIGNIFICANT CHANGE UP (ref 1–1.03)
SQUAMOUS # UR AUTO: 7 /HPF — HIGH (ref 0–5)
UROBILINOGEN FLD QL: 0.2 MG/DL — SIGNIFICANT CHANGE UP (ref 0.2–1)
WBC UR QL: 4 /HPF — SIGNIFICANT CHANGE UP (ref 0–5)

## 2025-06-09 PROCEDURE — 99233 SBSQ HOSP IP/OBS HIGH 50: CPT

## 2025-06-09 RX ORDER — SODIUM CHLORIDE 9 G/1000ML
1000 INJECTION, SOLUTION INTRAVENOUS
Refills: 0 | Status: DISCONTINUED | OUTPATIENT
Start: 2025-06-09 | End: 2025-06-10

## 2025-06-09 RX ADMIN — Medication 650 MILLIGRAM(S): at 13:19

## 2025-06-09 RX ADMIN — Medication 325 MILLIGRAM(S): at 05:21

## 2025-06-09 RX ADMIN — Medication 500 MILLIGRAM(S): at 11:04

## 2025-06-09 RX ADMIN — Medication 650 MILLIGRAM(S): at 05:21

## 2025-06-09 RX ADMIN — Medication 2 TABLET(S): at 21:35

## 2025-06-09 RX ADMIN — INSULIN GLARGINE-YFGN 15 UNIT(S): 100 INJECTION, SOLUTION SUBCUTANEOUS at 21:34

## 2025-06-09 RX ADMIN — Medication 100 MILLIGRAM(S): at 21:35

## 2025-06-09 RX ADMIN — INSULIN LISPRO 3 UNIT(S): 100 INJECTION, SOLUTION INTRAVENOUS; SUBCUTANEOUS at 08:32

## 2025-06-09 RX ADMIN — APIXABAN 2.5 MILLIGRAM(S): 2.5 TABLET, FILM COATED ORAL at 18:04

## 2025-06-09 RX ADMIN — Medication 650 MILLIGRAM(S): at 21:35

## 2025-06-09 RX ADMIN — INSULIN LISPRO 2: 100 INJECTION, SOLUTION INTRAVENOUS; SUBCUTANEOUS at 18:05

## 2025-06-09 RX ADMIN — SODIUM CHLORIDE 75 MILLILITER(S): 9 INJECTION, SOLUTION INTRAVENOUS at 16:34

## 2025-06-09 RX ADMIN — INSULIN LISPRO 2: 100 INJECTION, SOLUTION INTRAVENOUS; SUBCUTANEOUS at 13:15

## 2025-06-09 RX ADMIN — LISINOPRIL 100 MILLIGRAM(S): 30 TABLET ORAL at 05:21

## 2025-06-09 RX ADMIN — Medication 100 MILLIGRAM(S): at 05:21

## 2025-06-09 RX ADMIN — Medication 325 MILLIGRAM(S): at 13:20

## 2025-06-09 RX ADMIN — Medication 325 MILLIGRAM(S): at 21:35

## 2025-06-09 RX ADMIN — EPOETIN ALFA 20000 UNIT(S): 10000 SOLUTION INTRAVENOUS; SUBCUTANEOUS at 11:04

## 2025-06-09 RX ADMIN — INSULIN LISPRO 3 UNIT(S): 100 INJECTION, SOLUTION INTRAVENOUS; SUBCUTANEOUS at 13:16

## 2025-06-09 RX ADMIN — APIXABAN 2.5 MILLIGRAM(S): 2.5 TABLET, FILM COATED ORAL at 05:21

## 2025-06-09 RX ADMIN — INSULIN LISPRO 6: 100 INJECTION, SOLUTION INTRAVENOUS; SUBCUTANEOUS at 21:35

## 2025-06-09 RX ADMIN — INSULIN LISPRO 3 UNIT(S): 100 INJECTION, SOLUTION INTRAVENOUS; SUBCUTANEOUS at 18:05

## 2025-06-09 RX ADMIN — CEFTRIAXONE 1000 MILLIGRAM(S): 500 INJECTION, POWDER, FOR SOLUTION INTRAMUSCULAR; INTRAVENOUS at 08:01

## 2025-06-09 RX ADMIN — Medication 100 MILLIGRAM(S): at 13:20

## 2025-06-09 RX ADMIN — ATORVASTATIN CALCIUM 80 MILLIGRAM(S): 80 TABLET, FILM COATED ORAL at 21:35

## 2025-06-09 RX ADMIN — Medication 81 MILLIGRAM(S): at 11:04

## 2025-06-09 NOTE — PROGRESS NOTE ADULT - SUBJECTIVE AND OBJECTIVE BOX
NEPHROLOGY INTERVAL HPI/OVERNIGHT EVENTS:    Examined earlier  No distress  Elderly  Frail lethargic    MEDICATIONS  (STANDING):  apixaban 2.5 milliGRAM(s) Oral every 12 hours  ascorbic acid 500 milliGRAM(s) Oral daily  aspirin enteric coated 81 milliGRAM(s) Oral daily  atenolol  Tablet 100 milliGRAM(s) Oral daily  atorvastatin 80 milliGRAM(s) Oral at bedtime  cefTRIAXone Injectable. 1000 milliGRAM(s) IV Push every 24 hours  dextrose 5%. 1000 milliLiter(s) (100 mL/Hr) IV Continuous <Continuous>  dextrose 5%. 1000 milliLiter(s) (50 mL/Hr) IV Continuous <Continuous>  dextrose 50% Injectable 25 Gram(s) IV Push once  dextrose 50% Injectable 12.5 Gram(s) IV Push once  dextrose 50% Injectable 25 Gram(s) IV Push once  epoetin rod-epbx (RETACRIT) Injectable 47002 Unit(s) SubCutaneous <User Schedule>  ferrous    sulfate 325 milliGRAM(s) Oral every 8 hours  glucagon  Injectable 1 milliGRAM(s) IntraMuscular once  hydrALAZINE 100 milliGRAM(s) Oral every 8 hours  insulin glargine Injectable (LANTUS) 15 Unit(s) SubCutaneous at bedtime  insulin lispro (ADMELOG) corrective regimen sliding scale   SubCutaneous Before meals and at bedtime  insulin lispro Injectable (ADMELOG) 3 Unit(s) SubCutaneous three times a day before meals  senna 2 Tablet(s) Oral at bedtime  sodium bicarbonate 650 milliGRAM(s) Oral three times a day    MEDICATIONS  (PRN):  acetaminophen     Tablet .. 650 milliGRAM(s) Oral every 6 hours PRN Temp greater or equal to 38C (100.4F), Moderate Pain (4 - 6)  dextrose Oral Gel 15 Gram(s) Oral once PRN Blood Glucose LESS THAN 70 milliGRAM(s)/deciliter  hydrALAZINE Injectable 10 milliGRAM(s) IV Push every 4 hours   labetalol Injectable 10 milliGRAM(s) IV Push every 4 hours PRN SBP > 190  ondansetron Injectable 4 milliGRAM(s) IV Push every 6 hours PRN Nausea and/or Vomiting  polyethylene glycol 3350 17 Gram(s) Oral at bedtime PRN Constipation      Allergies    No Known Allergies    Intolerances        Vital Signs Last 24 Hrs  T(C): 37 (2025 12:41), Max: 37 (2025 12:41)  T(F): 98.6 (2025 12:41), Max: 98.6 (2025 12:41)  HR: 67 (2025 12:41) (67 - 85)  BP: 133/72 (2025 12:41) (123/67 - 170/75)  BP(mean): --  RR: 18 (2025 12:41) (18 - 18)  SpO2: 97% (2025 12:41) (95% - 98%)    Parameters below as of 2025 12:41  Patient On (Oxygen Delivery Method): room air    PHYSICAL EXAM:  GENERAL: NAD  HEAD: NCAT  NECK: Supple, No JVD  NERVOUS SYSTEM:  Awake lethargic  CHEST/LUNG: Clear / EAE . No wheeze   HEART: Regular rate and rhythm; No gallop or rub   ABDOMEN: Soft, Nontender, Nondistended; +BS  EXTREMITIES:  no edema     LABS:        138  |  106  |  43.9[H]  ----------------------------<  110[H]  4.8   |  19.0[L]  |  2.14[H]    Ca    8.2[L]      2025 04:33        Urinalysis Basic - ( 2025 11:00 )    Color: Yellow / Appearance: Clear / S.015 / pH: x  Gluc: x / Ketone: x  / Bili: Negative / Urobili: 0.2 mg/dL   Blood: x / Protein: 100 mg/dL / Nitrite: Negative   Leuk Esterase: Trace / RBC: 6 /HPF / WBC 4 /HPF   Sq Epi: x / Non Sq Epi: 7 /HPF / Bacteria: Negative /HPF              RADIOLOGY & ADDITIONAL TESTS:

## 2025-06-09 NOTE — PROGRESS NOTE ADULT - ASSESSMENT
85 y/o F with hx of prior stroke in 2022, hx of Right carotid stent > 5 years for asymptomatic carotid disease, PE in 2022 s/p thrombectomy post-covid vaccine, on Eliquis 2.5mg BID and ASA, DM, CKD, Anemia, HLD who presented for transient episode of slurred speech and R arm weakness -- she had difficulty raising her right arm and pulling herself up.  No other focal weakness. Her children also noted she was slurring her words. Upon arrival here, no more symptoms, denied any prior episodes.  Admits to noncompliance with Eliquis and misses nightly dose regularly.  Follows with hematology/pulm/nephro.  Has known anemia and CKD, she had Imaging done in the ED showed CT HEAD: No acute intracranial findings, CT PERFUSION: No focal perfusion deficit, CT ANGIOGRAPHY NECK: Right carotid stent appears patent. Short-segment moderate narrowing proximal left cervical ICA.  CT ANGIOGRAPHY HEAD: Moderate narrowing left cavernous segment ICA. Severe narrowing right distal petrous/proximal cavernous segment ICA. Mild to moderate narrowing right paraclinoid segment ICA. Focal moderate narrowing right proximal P2 segment. Focal near-complete occlusion right distal P2 segment, patient is admitted under stroke team and transferred to Medicine on 6/4/25.    TIA   History of CVA / Carotid Stenosis   MRI with no acute stroke   Stroke neuro checks q 8 hours    Continues Eliquis 2.5mg BID and ASA 81mg daily  Continue Atorvastatin 80 mg daily  Neuro recs appreciated    JENNIFER on CKD 3  HCTZ on hold  improving slowly   Continue Sodium Bicarb  Nephro recs appreciated     UTI suspected  Abnormal UA on 6/2  s/p 2 doses of Rocephin. Restarted today.   Urine culture is not send     DM 2  A1c 8.7  Hold Glipizide and Metformin  Accu checks and ISS  Continue Lantus 15 units and Premeal Admelog 3 units     HTN  Continue Atenolol  Increase Hydralazine to 100 mg q 8 hours   HCTZ on hold due to JENNIFER    HLD  Continue Lipitor     Acute on Chronic Anemia  s/p 1 PRBC  Continue Venofer   Monitor hb stable     DVT Prophylaxis -- Patient is on Eliquis    Dispo: Home likely in 24 hours if renal function improves

## 2025-06-09 NOTE — PROGRESS NOTE ADULT - SUBJECTIVE AND OBJECTIVE BOX
Patient is a 84y old  Female with TIA , ARF           Patient seen and examined at bedside, she is feeling better   no complaints   chart reviewed     ALLERGIES:  No Known Allergies    MEDICATIONS  (STANDING):  apixaban 2.5 milliGRAM(s) Oral every 12 hours  ascorbic acid 500 milliGRAM(s) Oral daily  aspirin enteric coated 81 milliGRAM(s) Oral daily  atenolol  Tablet 100 milliGRAM(s) Oral daily  atorvastatin 80 milliGRAM(s) Oral at bedtime  cefTRIAXone Injectable. 1000 milliGRAM(s) IV Push every 24 hours  dextrose 5%. 1000 milliLiter(s) (100 mL/Hr) IV Continuous <Continuous>  dextrose 5%. 1000 milliLiter(s) (50 mL/Hr) IV Continuous <Continuous>  dextrose 50% Injectable 25 Gram(s) IV Push once  dextrose 50% Injectable 12.5 Gram(s) IV Push once  dextrose 50% Injectable 25 Gram(s) IV Push once  epoetin rod-epbx (RETACRIT) Injectable 76025 Unit(s) SubCutaneous <User Schedule>  ferrous    sulfate 325 milliGRAM(s) Oral every 8 hours  glucagon  Injectable 1 milliGRAM(s) IntraMuscular once  hydrALAZINE 100 milliGRAM(s) Oral every 8 hours  insulin glargine Injectable (LANTUS) 15 Unit(s) SubCutaneous at bedtime  insulin lispro (ADMELOG) corrective regimen sliding scale   SubCutaneous Before meals and at bedtime  insulin lispro Injectable (ADMELOG) 3 Unit(s) SubCutaneous three times a day before meals  senna 2 Tablet(s) Oral at bedtime  sodium bicarbonate 650 milliGRAM(s) Oral three times a day    MEDICATIONS  (PRN):  acetaminophen     Tablet .. 650 milliGRAM(s) Oral every 6 hours PRN Temp greater or equal to 38C (100.4F), Moderate Pain (4 - 6)  dextrose Oral Gel 15 Gram(s) Oral once PRN Blood Glucose LESS THAN 70 milliGRAM(s)/deciliter  hydrALAZINE Injectable 10 milliGRAM(s) IV Push every 4 hours   labetalol Injectable 10 milliGRAM(s) IV Push every 4 hours PRN SBP > 190  ondansetron Injectable 4 milliGRAM(s) IV Push every 6 hours PRN Nausea and/or Vomiting  polyethylene glycol 3350 17 Gram(s) Oral at bedtime PRN Constipation    Vital Signs Last 24 Hrs  T(F): 98.6 (2025 12:41), Max: 98.6 (2025 14:56)  HR: 67 (2025 12:41) (67 - 85)  BP: 133/72 (2025 12:41) (123/67 - 174/72)  RR: 18 (2025 12:41) (18 - 18)  SpO2: 97% (2025 12:41) (95% - 98%)  I&O's Summary    2025 07:01  -  2025 07:00  --------------------------------------------------------  IN: 250 mL / OUT: 800 mL / NET: -550 mL        PHYSICAL EXAM:  General: awke   Neck: supple   Lungs: CTA   Cardio: RRR, S1/S2, No murmur  Abdomen: Soft, Nontender, Nondistended; Bowel sounds present  Extremities: No calf tenderness, No pitting edema    LABS:        138  |  106  |  43.9  ----------------------------<  110  4.8   |  19.0  |  2.14    Ca    8.2      2025 04:33                      06-02 Chol 200 mg/dL LDL -- HDL 38 mg/dL Trig 171 mg/dL              POCT Blood Glucose.: 178 mg/dL (2025 13:11)  POCT Blood Glucose.: 130 mg/dL (2025 08:31)  POCT Blood Glucose.: 231 mg/dL (2025 21:53)  POCT Blood Glucose.: 206 mg/dL (2025 16:04)      Urinalysis Basic - ( 2025 11:00 )    Color: Yellow / Appearance: Clear / S.015 / pH: x  Gluc: x / Ketone: x  / Bili: Negative / Urobili: 0.2 mg/dL   Blood: x / Protein: 100 mg/dL / Nitrite: Negative   Leuk Esterase: Trace / RBC: 6 /HPF / WBC 4 /HPF   Sq Epi: x / Non Sq Epi: 7 /HPF / Bacteria: Negative /HPF          RADIOLOGY & ADDITIONAL TESTS:

## 2025-06-09 NOTE — PROGRESS NOTE ADULT - ASSESSMENT
85 y/o F w h/o prior cva in 2022, hx of Right carotid stent > 5 years for asymptomatic carotid disease, PE in 2022 s/p thrombectomy post-covid vaccine, on Eliquis 2.5mg BID and ASA, DM, CKD, Anemia, HLD who presents for transient episode of slurred speech and R arm weakness    JENNIFER on CKD III - improving daily  S/P CTA 6/1/25   Suspect episode RCN / ATN   Serum Cr 2.6--> 2.4 --> 2.3 --> 2.2 --> 2.1  Prior Cr 1.8  Would cont to hold Metformin/ Enalapril   No hydro noted on US 6/3/25     Will give trial of gentle IVF w added bicarb for MA    HTN - Better w/ med adjustments     MA - cont oral Bicarb inc to TID    Anemia -  s/p transfusion 6/4  Noted low iron stores --->  cont  Venofer x 3 until completion  B 12 and folate OK   Cont Retacrit Q wk    Monitor labs will follow

## 2025-06-10 VITALS
RESPIRATION RATE: 18 BRPM | DIASTOLIC BLOOD PRESSURE: 73 MMHG | OXYGEN SATURATION: 96 % | SYSTOLIC BLOOD PRESSURE: 165 MMHG | HEART RATE: 65 BPM | TEMPERATURE: 98 F

## 2025-06-10 LAB
ANION GAP SERPL CALC-SCNC: 13 MMOL/L — SIGNIFICANT CHANGE UP (ref 5–17)
BUN SERPL-MCNC: 37.4 MG/DL — HIGH (ref 8–20)
CALCIUM SERPL-MCNC: 8 MG/DL — LOW (ref 8.4–10.5)
CHLORIDE SERPL-SCNC: 105 MMOL/L — SIGNIFICANT CHANGE UP (ref 96–108)
CO2 SERPL-SCNC: 20 MMOL/L — LOW (ref 22–29)
CREAT SERPL-MCNC: 1.97 MG/DL — HIGH (ref 0.5–1.3)
EGFR: 24 ML/MIN/1.73M2 — LOW
EGFR: 24 ML/MIN/1.73M2 — LOW
GLUCOSE BLDC GLUCOMTR-MCNC: 132 MG/DL — HIGH (ref 70–99)
GLUCOSE BLDC GLUCOMTR-MCNC: 190 MG/DL — HIGH (ref 70–99)
GLUCOSE SERPL-MCNC: 123 MG/DL — HIGH (ref 70–99)
POTASSIUM SERPL-MCNC: 4.5 MMOL/L — SIGNIFICANT CHANGE UP (ref 3.5–5.3)
POTASSIUM SERPL-SCNC: 4.5 MMOL/L — SIGNIFICANT CHANGE UP (ref 3.5–5.3)
SODIUM SERPL-SCNC: 138 MMOL/L — SIGNIFICANT CHANGE UP (ref 135–145)

## 2025-06-10 PROCEDURE — 83550 IRON BINDING TEST: CPT

## 2025-06-10 PROCEDURE — 86900 BLOOD TYPING SEROLOGIC ABO: CPT

## 2025-06-10 PROCEDURE — 84466 ASSAY OF TRANSFERRIN: CPT

## 2025-06-10 PROCEDURE — P9016: CPT

## 2025-06-10 PROCEDURE — 87077 CULTURE AEROBIC IDENTIFY: CPT

## 2025-06-10 PROCEDURE — 70496 CT ANGIOGRAPHY HEAD: CPT

## 2025-06-10 PROCEDURE — 82550 ASSAY OF CK (CPK): CPT

## 2025-06-10 PROCEDURE — 80076 HEPATIC FUNCTION PANEL: CPT

## 2025-06-10 PROCEDURE — 87186 SC STD MICRODIL/AGAR DIL: CPT

## 2025-06-10 PROCEDURE — 36430 TRANSFUSION BLD/BLD COMPNT: CPT

## 2025-06-10 PROCEDURE — 84484 ASSAY OF TROPONIN QUANT: CPT

## 2025-06-10 PROCEDURE — 81001 URINALYSIS AUTO W/SCOPE: CPT

## 2025-06-10 PROCEDURE — 82570 ASSAY OF URINE CREATININE: CPT

## 2025-06-10 PROCEDURE — 97116 GAIT TRAINING THERAPY: CPT

## 2025-06-10 PROCEDURE — 83735 ASSAY OF MAGNESIUM: CPT

## 2025-06-10 PROCEDURE — 71045 X-RAY EXAM CHEST 1 VIEW: CPT

## 2025-06-10 PROCEDURE — 0042T: CPT

## 2025-06-10 PROCEDURE — 36415 COLL VENOUS BLD VENIPUNCTURE: CPT

## 2025-06-10 PROCEDURE — C8929: CPT

## 2025-06-10 PROCEDURE — 82746 ASSAY OF FOLIC ACID SERUM: CPT

## 2025-06-10 PROCEDURE — 70450 CT HEAD/BRAIN W/O DYE: CPT

## 2025-06-10 PROCEDURE — 97167 OT EVAL HIGH COMPLEX 60 MIN: CPT

## 2025-06-10 PROCEDURE — 84300 ASSAY OF URINE SODIUM: CPT

## 2025-06-10 PROCEDURE — 85045 AUTOMATED RETICULOCYTE COUNT: CPT

## 2025-06-10 PROCEDURE — 93005 ELECTROCARDIOGRAM TRACING: CPT

## 2025-06-10 PROCEDURE — 82607 VITAMIN B-12: CPT

## 2025-06-10 PROCEDURE — 93970 EXTREMITY STUDY: CPT

## 2025-06-10 PROCEDURE — 83036 HEMOGLOBIN GLYCOSYLATED A1C: CPT

## 2025-06-10 PROCEDURE — 87086 URINE CULTURE/COLONY COUNT: CPT

## 2025-06-10 PROCEDURE — 93880 EXTRACRANIAL BILAT STUDY: CPT

## 2025-06-10 PROCEDURE — 80048 BASIC METABOLIC PNL TOTAL CA: CPT

## 2025-06-10 PROCEDURE — 82728 ASSAY OF FERRITIN: CPT

## 2025-06-10 PROCEDURE — 85025 COMPLETE CBC W/AUTO DIFF WBC: CPT

## 2025-06-10 PROCEDURE — 86850 RBC ANTIBODY SCREEN: CPT

## 2025-06-10 PROCEDURE — 80061 LIPID PANEL: CPT

## 2025-06-10 PROCEDURE — 70551 MRI BRAIN STEM W/O DYE: CPT

## 2025-06-10 PROCEDURE — 84100 ASSAY OF PHOSPHORUS: CPT

## 2025-06-10 PROCEDURE — 97530 THERAPEUTIC ACTIVITIES: CPT

## 2025-06-10 PROCEDURE — 85027 COMPLETE CBC AUTOMATED: CPT

## 2025-06-10 PROCEDURE — 76775 US EXAM ABDO BACK WALL LIM: CPT

## 2025-06-10 PROCEDURE — 99239 HOSP IP/OBS DSCHRG MGMT >30: CPT

## 2025-06-10 PROCEDURE — 86901 BLOOD TYPING SEROLOGIC RH(D): CPT

## 2025-06-10 PROCEDURE — 70498 CT ANGIOGRAPHY NECK: CPT

## 2025-06-10 PROCEDURE — 85610 PROTHROMBIN TIME: CPT

## 2025-06-10 PROCEDURE — 99285 EMERGENCY DEPT VISIT HI MDM: CPT

## 2025-06-10 PROCEDURE — 83540 ASSAY OF IRON: CPT

## 2025-06-10 PROCEDURE — 82962 GLUCOSE BLOOD TEST: CPT

## 2025-06-10 PROCEDURE — 80053 COMPREHEN METABOLIC PANEL: CPT

## 2025-06-10 PROCEDURE — 85730 THROMBOPLASTIN TIME PARTIAL: CPT

## 2025-06-10 PROCEDURE — 86923 COMPATIBILITY TEST ELECTRIC: CPT

## 2025-06-10 RX ORDER — SODIUM BICARBONATE 1 MEQ/ML
1 SYRINGE (ML) INTRAVENOUS
Qty: 90 | Refills: 0
Start: 2025-06-10 | End: 2025-07-09

## 2025-06-10 RX ORDER — HYDROCHLOROTHIAZIDE 50 MG/1
1 TABLET ORAL
Refills: 0 | DISCHARGE

## 2025-06-10 RX ORDER — METFORMIN HYDROCHLORIDE 850 MG/1
1 TABLET ORAL
Refills: 0 | DISCHARGE

## 2025-06-10 RX ORDER — SENNA 187 MG
2 TABLET ORAL
Qty: 0 | Refills: 0 | DISCHARGE
Start: 2025-06-10

## 2025-06-10 RX ORDER — LISINOPRIL 30 MG/1
1 TABLET ORAL
Refills: 0 | DISCHARGE

## 2025-06-10 RX ORDER — ACETAMINOPHEN 500 MG/5ML
2 LIQUID (ML) ORAL
Qty: 0 | Refills: 0 | DISCHARGE
Start: 2025-06-10

## 2025-06-10 RX ORDER — LISINOPRIL 30 MG/1
1 TABLET ORAL
Qty: 0 | Refills: 0 | DISCHARGE
Start: 2025-06-10

## 2025-06-10 RX ORDER — POLYETHYLENE GLYCOL 3350 17 G/17G
17 POWDER, FOR SOLUTION ORAL
Qty: 0 | Refills: 0 | DISCHARGE
Start: 2025-06-10

## 2025-06-10 RX ADMIN — LISINOPRIL 100 MILLIGRAM(S): 30 TABLET ORAL at 05:47

## 2025-06-10 RX ADMIN — Medication 650 MILLIGRAM(S): at 05:46

## 2025-06-10 RX ADMIN — SODIUM CHLORIDE 75 MILLILITER(S): 9 INJECTION, SOLUTION INTRAVENOUS at 05:49

## 2025-06-10 RX ADMIN — Medication 100 MILLIGRAM(S): at 05:46

## 2025-06-10 RX ADMIN — Medication 500 MILLIGRAM(S): at 11:37

## 2025-06-10 RX ADMIN — APIXABAN 2.5 MILLIGRAM(S): 2.5 TABLET, FILM COATED ORAL at 05:47

## 2025-06-10 RX ADMIN — CEFTRIAXONE 1000 MILLIGRAM(S): 500 INJECTION, POWDER, FOR SOLUTION INTRAMUSCULAR; INTRAVENOUS at 09:41

## 2025-06-10 RX ADMIN — Medication 325 MILLIGRAM(S): at 05:47

## 2025-06-10 RX ADMIN — Medication 325 MILLIGRAM(S): at 14:11

## 2025-06-10 RX ADMIN — Medication 100 MILLIGRAM(S): at 14:11

## 2025-06-10 RX ADMIN — INSULIN LISPRO 2: 100 INJECTION, SOLUTION INTRAVENOUS; SUBCUTANEOUS at 14:12

## 2025-06-10 RX ADMIN — INSULIN LISPRO 3 UNIT(S): 100 INJECTION, SOLUTION INTRAVENOUS; SUBCUTANEOUS at 09:26

## 2025-06-10 RX ADMIN — Medication 81 MILLIGRAM(S): at 11:37

## 2025-06-10 RX ADMIN — Medication 650 MILLIGRAM(S): at 14:11

## 2025-06-10 NOTE — PROGRESS NOTE ADULT - ASSESSMENT
85 y/o F w h/o prior cva in 2022, hx of Right carotid stent > 5 years for asymptomatic carotid disease, PE in 2022 s/p thrombectomy post-covid vaccine, on Eliquis 2.5mg BID and ASA, DM, CKD, Anemia, HLD who presents for transient episode of slurred speech and R arm weakness    JENNIFER on CKD III - improving daily  S/P CTA 6/1/25   Suspect episode RCN / ATN   Serum Cr 2.6--> 2.4 --> 2.3 --> 2.2 --> 2.1 --> 1.9  Prior Cr 1.8  Would cont to hold Metformin/ Enalapril   No hydro noted on US 6/3/25     Will give trial of gentle IVF w added bicarb for MA- ok to stop     HTN - Better w/ med adjustments     MA - cont oral Bicarb inc to TID, cont at dc    Anemia -  s/p transfusion 6/4  Noted low iron stores --->  cont  Venofer x 3 until completion  B 12 and folate OK   Cont Retacrit Q wk    Monitor labs will follow--> ok to dc from renal standpoint should f/u in our office 1-2 weeks

## 2025-06-10 NOTE — PROGRESS NOTE ADULT - SUBJECTIVE AND OBJECTIVE BOX
NEPHROLOGY INTERVAL HPI/OVERNIGHT EVENTS:    Examined earlier  No distress  Feeling better   Denies HA CP no SOB    MEDICATIONS  (STANDING):  apixaban 2.5 milliGRAM(s) Oral every 12 hours  ascorbic acid 500 milliGRAM(s) Oral daily  aspirin enteric coated 81 milliGRAM(s) Oral daily  atenolol  Tablet 100 milliGRAM(s) Oral daily  atorvastatin 80 milliGRAM(s) Oral at bedtime  dextrose 5% + sodium chloride 0.45% 1000 milliLiter(s) (75 mL/Hr) IV Continuous <Continuous>  dextrose 5%. 1000 milliLiter(s) (100 mL/Hr) IV Continuous <Continuous>  dextrose 5%. 1000 milliLiter(s) (50 mL/Hr) IV Continuous <Continuous>  dextrose 50% Injectable 25 Gram(s) IV Push once  dextrose 50% Injectable 12.5 Gram(s) IV Push once  dextrose 50% Injectable 25 Gram(s) IV Push once  epoetin rod-epbx (RETACRIT) Injectable 21895 Unit(s) SubCutaneous <User Schedule>  ferrous    sulfate 325 milliGRAM(s) Oral every 8 hours  glucagon  Injectable 1 milliGRAM(s) IntraMuscular once  hydrALAZINE 100 milliGRAM(s) Oral every 8 hours  insulin glargine Injectable (LANTUS) 15 Unit(s) SubCutaneous at bedtime  insulin lispro (ADMELOG) corrective regimen sliding scale   SubCutaneous Before meals and at bedtime  insulin lispro Injectable (ADMELOG) 3 Unit(s) SubCutaneous three times a day before meals  senna 2 Tablet(s) Oral at bedtime  sodium bicarbonate 650 milliGRAM(s) Oral three times a day    MEDICATIONS  (PRN):  acetaminophen     Tablet .. 650 milliGRAM(s) Oral every 6 hours PRN Temp greater or equal to 38C (100.4F), Moderate Pain (4 - 6)  dextrose Oral Gel 15 Gram(s) Oral once PRN Blood Glucose LESS THAN 70 milliGRAM(s)/deciliter  hydrALAZINE Injectable 10 milliGRAM(s) IV Push every 4 hours   labetalol Injectable 10 milliGRAM(s) IV Push every 4 hours PRN SBP > 190  ondansetron Injectable 4 milliGRAM(s) IV Push every 6 hours PRN Nausea and/or Vomiting  polyethylene glycol 3350 17 Gram(s) Oral at bedtime PRN Constipation      Allergies    No Known Allergies    Intolerances        Vital Signs Last 24 Hrs  T(C): 36.9 (10 Hernan 2025 12:33), Max: 37.6 (09 Jun 2025 15:34)  T(F): 98.5 (10 Hernan 2025 12:33), Max: 99.7 (09 Jun 2025 15:34)  HR: 65 (10 Hernan 2025 12:33) (65 - 81)  BP: 165/73 (10 Hernan 2025 12:33) (114/63 - 165/73)  BP(mean): --  RR: 18 (10 Hernan 2025 12:33) (17 - 18)  SpO2: 96% (10 Hernan 2025 12:33) (94% - 99%)    Parameters below as of 10 Hernan 2025 12:33  Patient On (Oxygen Delivery Method): room air      PHYSICAL EXAM:  GENERAL: NAD  HEAD: NCAT  NECK: Supple, No JVD  NERVOUS SYSTEM:  Awake lethargic  CHEST/LUNG: Clear / EAE . No wheeze   HEART: Regular rate and rhythm; No gallop or rub   ABDOMEN: Soft, Nontender, Nondistended; +BS  EXTREMITIES:  no edema     LABS:    06-10    138  |  105  |  37.4[H]  ----------------------------<  123[H]  4.5   |  20.0[L]  |  1.97[H]    Ca    8.0[L]      10 Hernan 2025 04:31        Urinalysis Basic - ( 10 Hernan 2025 04:31 )    Color: x / Appearance: x / SG: x / pH: x  Gluc: 123 mg/dL / Ketone: x  / Bili: x / Urobili: x   Blood: x / Protein: x / Nitrite: x   Leuk Esterase: x / RBC: x / WBC x   Sq Epi: x / Non Sq Epi: x / Bacteria: x              RADIOLOGY & ADDITIONAL TESTS:

## 2025-06-10 NOTE — PROGRESS NOTE ADULT - PROVIDER SPECIALTY LIST ADULT
Hospitalist
Nephrology
Neurology
Hospitalist
Hospitalist
Nephrology
Neurology
Hospitalist
Hospitalist
Nephrology
Neurology
Hospitalist
Hospitalist
Nephrology
Neurology

## 2025-06-11 LAB
-  AMPICILLIN: SIGNIFICANT CHANGE UP
-  CIPROFLOXACIN: SIGNIFICANT CHANGE UP
-  LEVOFLOXACIN: SIGNIFICANT CHANGE UP
-  NITROFURANTOIN: SIGNIFICANT CHANGE UP
-  TETRACYCLINE: SIGNIFICANT CHANGE UP
-  VANCOMYCIN: SIGNIFICANT CHANGE UP
CULTURE RESULTS: ABNORMAL
METHOD TYPE: SIGNIFICANT CHANGE UP
ORGANISM # SPEC MICROSCOPIC CNT: ABNORMAL
ORGANISM # SPEC MICROSCOPIC CNT: SIGNIFICANT CHANGE UP
SPECIMEN SOURCE: SIGNIFICANT CHANGE UP

## 2025-06-12 ENCOUNTER — OFFICE (OUTPATIENT)
Dept: URBAN - METROPOLITAN AREA CLINIC 115 | Facility: CLINIC | Age: 85
Setting detail: OPHTHALMOLOGY
End: 2025-06-12
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
## 2025-06-12 PROCEDURE — NO SHOW FE NO SHOW FEE: Performed by: OPHTHALMOLOGY

## 2025-06-17 ENCOUNTER — INPATIENT (INPATIENT)
Facility: HOSPITAL | Age: 85
LOS: 3 days | Discharge: HOME | DRG: 125 | End: 2025-06-21
Attending: STUDENT IN AN ORGANIZED HEALTH CARE EDUCATION/TRAINING PROGRAM | Admitting: HOSPITALIST
Payer: MEDICARE

## 2025-06-17 VITALS
OXYGEN SATURATION: 99 % | TEMPERATURE: 98 F | HEART RATE: 72 BPM | SYSTOLIC BLOOD PRESSURE: 191 MMHG | DIASTOLIC BLOOD PRESSURE: 82 MMHG | RESPIRATION RATE: 16 BRPM | HEIGHT: 68 IN

## 2025-06-17 DIAGNOSIS — H53.8 OTHER VISUAL DISTURBANCES: ICD-10-CM

## 2025-06-17 LAB
ALBUMIN SERPL ELPH-MCNC: 3.6 G/DL — SIGNIFICANT CHANGE UP (ref 3.3–5.2)
ALP SERPL-CCNC: 86 U/L — SIGNIFICANT CHANGE UP (ref 40–120)
ALT FLD-CCNC: 10 U/L — SIGNIFICANT CHANGE UP
ANION GAP SERPL CALC-SCNC: 16 MMOL/L — SIGNIFICANT CHANGE UP (ref 5–17)
APTT BLD: 42.4 SEC — HIGH (ref 26.1–36.8)
AST SERPL-CCNC: 17 U/L — SIGNIFICANT CHANGE UP
BASOPHILS # BLD AUTO: 0.1 K/UL — SIGNIFICANT CHANGE UP (ref 0–0.2)
BASOPHILS # BLD MANUAL: 0.2 K/UL — SIGNIFICANT CHANGE UP (ref 0–0.2)
BASOPHILS NFR BLD AUTO: 1.3 % — SIGNIFICANT CHANGE UP (ref 0–2)
BASOPHILS NFR BLD MANUAL: 2.6 % — HIGH (ref 0–2)
BILIRUB SERPL-MCNC: 0.4 MG/DL — SIGNIFICANT CHANGE UP (ref 0.4–2)
BUN SERPL-MCNC: 26.3 MG/DL — HIGH (ref 8–20)
CALCIUM SERPL-MCNC: 9 MG/DL — SIGNIFICANT CHANGE UP (ref 8.4–10.5)
CHLORIDE SERPL-SCNC: 105 MMOL/L — SIGNIFICANT CHANGE UP (ref 96–108)
CK SERPL-CCNC: 137 U/L — SIGNIFICANT CHANGE UP (ref 25–170)
CO2 SERPL-SCNC: 21 MMOL/L — LOW (ref 22–29)
CREAT SERPL-MCNC: 2.09 MG/DL — HIGH (ref 0.5–1.3)
EGFR: 23 ML/MIN/1.73M2 — LOW
EGFR: 23 ML/MIN/1.73M2 — LOW
EOSINOPHIL # BLD AUTO: 0.25 K/UL — SIGNIFICANT CHANGE UP (ref 0–0.5)
EOSINOPHIL # BLD MANUAL: 0.2 K/UL — SIGNIFICANT CHANGE UP (ref 0–0.5)
EOSINOPHIL NFR BLD AUTO: 3.3 % — SIGNIFICANT CHANGE UP (ref 0–6)
EOSINOPHIL NFR BLD MANUAL: 2.6 % — SIGNIFICANT CHANGE UP (ref 0–6)
GIANT PLATELETS BLD QL SMEAR: PRESENT
GLUCOSE SERPL-MCNC: 155 MG/DL — HIGH (ref 70–99)
HCT VFR BLD CALC: 33.3 % — LOW (ref 34.5–45)
HGB BLD-MCNC: 10 G/DL — LOW (ref 11.5–15.5)
IMM GRANULOCYTES # BLD AUTO: 0.01 K/UL — SIGNIFICANT CHANGE UP (ref 0–0.07)
IMM GRANULOCYTES NFR BLD AUTO: 0.1 % — SIGNIFICANT CHANGE UP (ref 0–0.9)
INR BLD: 1.25 RATIO — HIGH (ref 0.85–1.16)
LYMPHOCYTES # BLD AUTO: 2.35 K/UL — SIGNIFICANT CHANGE UP (ref 1–3.3)
LYMPHOCYTES # BLD MANUAL: 2.53 K/UL — SIGNIFICANT CHANGE UP (ref 1–3.3)
LYMPHOCYTES NFR BLD AUTO: 31.3 % — SIGNIFICANT CHANGE UP (ref 13–44)
LYMPHOCYTES NFR BLD MANUAL: 33.6 % — SIGNIFICANT CHANGE UP (ref 13–44)
MCHC RBC-ENTMCNC: 25.1 PG — LOW (ref 27–34)
MCHC RBC-ENTMCNC: 30 G/DL — LOW (ref 32–36)
MCV RBC AUTO: 83.7 FL — SIGNIFICANT CHANGE UP (ref 80–100)
MONOCYTES # BLD AUTO: 0.52 K/UL — SIGNIFICANT CHANGE UP (ref 0–0.9)
MONOCYTES # BLD MANUAL: 0.26 K/UL — SIGNIFICANT CHANGE UP (ref 0–0.9)
MONOCYTES NFR BLD AUTO: 6.9 % — SIGNIFICANT CHANGE UP (ref 2–14)
MONOCYTES NFR BLD MANUAL: 3.4 % — SIGNIFICANT CHANGE UP (ref 2–14)
NEUTROPHILS # BLD AUTO: 4.29 K/UL — SIGNIFICANT CHANGE UP (ref 1.8–7.4)
NEUTROPHILS # BLD MANUAL: 4.35 K/UL — SIGNIFICANT CHANGE UP (ref 1.8–7.4)
NEUTROPHILS NFR BLD AUTO: 57.1 % — SIGNIFICANT CHANGE UP (ref 43–77)
NEUTROPHILS NFR BLD MANUAL: 57.8 % — SIGNIFICANT CHANGE UP (ref 43–77)
NRBC # BLD AUTO: 0 K/UL — SIGNIFICANT CHANGE UP (ref 0–0)
NRBC # FLD: 0 K/UL — SIGNIFICANT CHANGE UP (ref 0–0)
NRBC BLD AUTO-RTO: 0 /100 WBCS — SIGNIFICANT CHANGE UP (ref 0–0)
PLAT MORPH BLD: NORMAL — SIGNIFICANT CHANGE UP
PLATELET # BLD AUTO: 330 K/UL — SIGNIFICANT CHANGE UP (ref 150–400)
PMV BLD: 11.5 FL — SIGNIFICANT CHANGE UP (ref 7–13)
POIKILOCYTOSIS BLD QL AUTO: SLIGHT — SIGNIFICANT CHANGE UP
POLYCHROMASIA BLD QL SMEAR: ABNORMAL
POTASSIUM SERPL-MCNC: 5.5 MMOL/L — HIGH (ref 3.5–5.3)
POTASSIUM SERPL-SCNC: 5.5 MMOL/L — HIGH (ref 3.5–5.3)
PROT SERPL-MCNC: 7.4 G/DL — SIGNIFICANT CHANGE UP (ref 6.6–8.7)
PROTHROM AB SERPL-ACNC: 14.5 SEC — HIGH (ref 9.9–13.4)
RBC # BLD: 3.98 M/UL — SIGNIFICANT CHANGE UP (ref 3.8–5.2)
RBC # FLD: 20.8 % — HIGH (ref 10.3–14.5)
RBC BLD AUTO: ABNORMAL
SCHISTOCYTES BLD QL AUTO: ABNORMAL
SMUDGE CELLS # BLD: PRESENT
SODIUM SERPL-SCNC: 142 MMOL/L — SIGNIFICANT CHANGE UP (ref 135–145)
TROPONIN T, HIGH SENSITIVITY RESULT: 35 NG/L — SIGNIFICANT CHANGE UP (ref 0–51)
WBC # BLD: 7.52 K/UL — SIGNIFICANT CHANGE UP (ref 3.8–10.5)
WBC # FLD AUTO: 7.52 K/UL — SIGNIFICANT CHANGE UP (ref 3.8–10.5)

## 2025-06-17 PROCEDURE — 70450 CT HEAD/BRAIN W/O DYE: CPT | Mod: 26,XU

## 2025-06-17 PROCEDURE — 70498 CT ANGIOGRAPHY NECK: CPT | Mod: 26

## 2025-06-17 PROCEDURE — 99497 ADVNCD CARE PLAN 30 MIN: CPT | Mod: 25

## 2025-06-17 PROCEDURE — 99223 1ST HOSP IP/OBS HIGH 75: CPT

## 2025-06-17 PROCEDURE — 70496 CT ANGIOGRAPHY HEAD: CPT | Mod: 26

## 2025-06-17 PROCEDURE — 99285 EMERGENCY DEPT VISIT HI MDM: CPT

## 2025-06-17 PROCEDURE — 71045 X-RAY EXAM CHEST 1 VIEW: CPT | Mod: 26

## 2025-06-17 PROCEDURE — 0042T: CPT

## 2025-06-17 RX ORDER — MAGNESIUM, ALUMINUM HYDROXIDE 200-200 MG
30 TABLET,CHEWABLE ORAL EVERY 4 HOURS
Refills: 0 | Status: DISCONTINUED | OUTPATIENT
Start: 2025-06-17 | End: 2025-06-21

## 2025-06-17 RX ORDER — ACETAMINOPHEN 500 MG/5ML
650 LIQUID (ML) ORAL EVERY 6 HOURS
Refills: 0 | Status: DISCONTINUED | OUTPATIENT
Start: 2025-06-17 | End: 2025-06-21

## 2025-06-17 RX ORDER — ONDANSETRON HCL/PF 4 MG/2 ML
4 VIAL (ML) INJECTION EVERY 8 HOURS
Refills: 0 | Status: DISCONTINUED | OUTPATIENT
Start: 2025-06-17 | End: 2025-06-21

## 2025-06-17 RX ORDER — MELATONIN 5 MG
3 TABLET ORAL AT BEDTIME
Refills: 0 | Status: DISCONTINUED | OUTPATIENT
Start: 2025-06-17 | End: 2025-06-21

## 2025-06-17 RX ORDER — MECLIZINE HCL 12.5 MG
50 TABLET ORAL ONCE
Refills: 0 | Status: COMPLETED | OUTPATIENT
Start: 2025-06-17 | End: 2025-06-17

## 2025-06-17 RX ORDER — SODIUM CHLORIDE 9 G/1000ML
1000 INJECTION, SOLUTION INTRAVENOUS ONCE
Refills: 0 | Status: COMPLETED | OUTPATIENT
Start: 2025-06-17 | End: 2025-06-17

## 2025-06-17 RX ADMIN — SODIUM CHLORIDE 1000 MILLILITER(S): 9 INJECTION, SOLUTION INTRAVENOUS at 21:44

## 2025-06-17 RX ADMIN — Medication 50 MILLIGRAM(S): at 21:44

## 2025-06-18 LAB
ANION GAP SERPL CALC-SCNC: 15 MMOL/L — SIGNIFICANT CHANGE UP (ref 5–17)
BUN SERPL-MCNC: 23.8 MG/DL — HIGH (ref 8–20)
CALCIUM SERPL-MCNC: 9 MG/DL — SIGNIFICANT CHANGE UP (ref 8.4–10.5)
CHLORIDE SERPL-SCNC: 105 MMOL/L — SIGNIFICANT CHANGE UP (ref 96–108)
CO2 SERPL-SCNC: 20 MMOL/L — LOW (ref 22–29)
CREAT SERPL-MCNC: 1.96 MG/DL — HIGH (ref 0.5–1.3)
EGFR: 24 ML/MIN/1.73M2 — LOW
EGFR: 24 ML/MIN/1.73M2 — LOW
GLUCOSE SERPL-MCNC: 148 MG/DL — HIGH (ref 70–99)
HCT VFR BLD CALC: 33.3 % — LOW (ref 34.5–45)
HGB BLD-MCNC: 9.9 G/DL — LOW (ref 11.5–15.5)
MCHC RBC-ENTMCNC: 24.8 PG — LOW (ref 27–34)
MCHC RBC-ENTMCNC: 29.7 G/DL — LOW (ref 32–36)
MCV RBC AUTO: 83.5 FL — SIGNIFICANT CHANGE UP (ref 80–100)
NRBC # BLD AUTO: 0 K/UL — SIGNIFICANT CHANGE UP (ref 0–0)
NRBC # FLD: 0 K/UL — SIGNIFICANT CHANGE UP (ref 0–0)
NRBC BLD AUTO-RTO: 0 /100 WBCS — SIGNIFICANT CHANGE UP (ref 0–0)
PLATELET # BLD AUTO: 281 K/UL — SIGNIFICANT CHANGE UP (ref 150–400)
PMV BLD: 10.1 FL — SIGNIFICANT CHANGE UP (ref 7–13)
POTASSIUM SERPL-MCNC: 5.3 MMOL/L — SIGNIFICANT CHANGE UP (ref 3.5–5.3)
POTASSIUM SERPL-SCNC: 5.3 MMOL/L — SIGNIFICANT CHANGE UP (ref 3.5–5.3)
RBC # BLD: 3.99 M/UL — SIGNIFICANT CHANGE UP (ref 3.8–5.2)
RBC # FLD: 20.2 % — HIGH (ref 10.3–14.5)
SODIUM SERPL-SCNC: 140 MMOL/L — SIGNIFICANT CHANGE UP (ref 135–145)
WBC # BLD: 6.8 K/UL — SIGNIFICANT CHANGE UP (ref 3.8–10.5)
WBC # FLD AUTO: 6.8 K/UL — SIGNIFICANT CHANGE UP (ref 3.8–10.5)

## 2025-06-18 PROCEDURE — 99222 1ST HOSP IP/OBS MODERATE 55: CPT

## 2025-06-18 PROCEDURE — 99233 SBSQ HOSP IP/OBS HIGH 50: CPT

## 2025-06-18 PROCEDURE — 70551 MRI BRAIN STEM W/O DYE: CPT | Mod: 26

## 2025-06-18 PROCEDURE — 93010 ELECTROCARDIOGRAM REPORT: CPT

## 2025-06-18 RX ORDER — ASPIRIN 325 MG
81 TABLET ORAL AT BEDTIME
Refills: 0 | Status: DISCONTINUED | OUTPATIENT
Start: 2025-06-18 | End: 2025-06-21

## 2025-06-18 RX ORDER — APIXABAN 2.5 MG/1
2.5 TABLET, FILM COATED ORAL EVERY 12 HOURS
Refills: 0 | Status: DISCONTINUED | OUTPATIENT
Start: 2025-06-18 | End: 2025-06-21

## 2025-06-18 RX ORDER — SENNA 187 MG
2 TABLET ORAL AT BEDTIME
Refills: 0 | Status: DISCONTINUED | OUTPATIENT
Start: 2025-06-18 | End: 2025-06-21

## 2025-06-18 RX ORDER — FERROUS SULFATE 137(45) MG
325 TABLET, EXTENDED RELEASE ORAL DAILY
Refills: 0 | Status: DISCONTINUED | OUTPATIENT
Start: 2025-06-18 | End: 2025-06-21

## 2025-06-18 RX ORDER — SODIUM ZIRCONIUM CYCLOSILICATE 5 G/5G
5 POWDER, FOR SUSPENSION ORAL ONCE
Refills: 0 | Status: COMPLETED | OUTPATIENT
Start: 2025-06-18 | End: 2025-06-18

## 2025-06-18 RX ORDER — LISINOPRIL 30 MG/1
100 TABLET ORAL DAILY
Refills: 0 | Status: DISCONTINUED | OUTPATIENT
Start: 2025-06-18 | End: 2025-06-21

## 2025-06-18 RX ORDER — ATORVASTATIN CALCIUM 80 MG/1
80 TABLET, FILM COATED ORAL AT BEDTIME
Refills: 0 | Status: DISCONTINUED | OUTPATIENT
Start: 2025-06-18 | End: 2025-06-21

## 2025-06-18 RX ORDER — SODIUM BICARBONATE 1 MEQ/ML
650 SYRINGE (ML) INTRAVENOUS EVERY 8 HOURS
Refills: 0 | Status: DISCONTINUED | OUTPATIENT
Start: 2025-06-18 | End: 2025-06-21

## 2025-06-18 RX ADMIN — ATORVASTATIN CALCIUM 80 MILLIGRAM(S): 80 TABLET, FILM COATED ORAL at 21:29

## 2025-06-18 RX ADMIN — LISINOPRIL 100 MILLIGRAM(S): 30 TABLET ORAL at 10:11

## 2025-06-18 RX ADMIN — APIXABAN 2.5 MILLIGRAM(S): 2.5 TABLET, FILM COATED ORAL at 05:04

## 2025-06-18 RX ADMIN — Medication 500 MILLIGRAM(S): at 10:09

## 2025-06-18 RX ADMIN — Medication 2 TABLET(S): at 21:29

## 2025-06-18 RX ADMIN — Medication 650 MILLIGRAM(S): at 13:32

## 2025-06-18 RX ADMIN — Medication 81 MILLIGRAM(S): at 21:28

## 2025-06-18 RX ADMIN — Medication 40 MILLIGRAM(S): at 05:04

## 2025-06-18 RX ADMIN — Medication 650 MILLIGRAM(S): at 21:29

## 2025-06-18 RX ADMIN — Medication 100 MILLIGRAM(S): at 12:19

## 2025-06-18 RX ADMIN — Medication 325 MILLIGRAM(S): at 10:09

## 2025-06-18 RX ADMIN — Medication 650 MILLIGRAM(S): at 05:03

## 2025-06-18 RX ADMIN — Medication 100 MILLIGRAM(S): at 21:28

## 2025-06-18 RX ADMIN — SODIUM ZIRCONIUM CYCLOSILICATE 5 GRAM(S): 5 POWDER, FOR SUSPENSION ORAL at 12:19

## 2025-06-18 RX ADMIN — APIXABAN 2.5 MILLIGRAM(S): 2.5 TABLET, FILM COATED ORAL at 17:33

## 2025-06-18 RX ADMIN — Medication 650 MILLIGRAM(S): at 08:44

## 2025-06-18 RX ADMIN — Medication 650 MILLIGRAM(S): at 09:44

## 2025-06-19 ENCOUNTER — TRANSCRIPTION ENCOUNTER (OUTPATIENT)
Age: 85
End: 2025-06-19

## 2025-06-19 PROBLEM — Z00.00 ENCOUNTER FOR PREVENTIVE HEALTH EXAMINATION: Status: ACTIVE | Noted: 2025-06-19

## 2025-06-19 PROCEDURE — 99232 SBSQ HOSP IP/OBS MODERATE 35: CPT

## 2025-06-19 RX ORDER — AMLODIPINE BESYLATE 10 MG/1
10 TABLET ORAL DAILY
Refills: 0 | Status: DISCONTINUED | OUTPATIENT
Start: 2025-06-19 | End: 2025-06-21

## 2025-06-19 RX ADMIN — ATORVASTATIN CALCIUM 80 MILLIGRAM(S): 80 TABLET, FILM COATED ORAL at 21:52

## 2025-06-19 RX ADMIN — LISINOPRIL 100 MILLIGRAM(S): 30 TABLET ORAL at 05:41

## 2025-06-19 RX ADMIN — Medication 100 MILLIGRAM(S): at 21:50

## 2025-06-19 RX ADMIN — AMLODIPINE BESYLATE 10 MILLIGRAM(S): 10 TABLET ORAL at 13:46

## 2025-06-19 RX ADMIN — Medication 0.1 MILLIGRAM(S): at 13:35

## 2025-06-19 RX ADMIN — Medication 650 MILLIGRAM(S): at 21:50

## 2025-06-19 RX ADMIN — Medication 40 MILLIGRAM(S): at 05:41

## 2025-06-19 RX ADMIN — Medication 0.1 MILLIGRAM(S): at 21:50

## 2025-06-19 RX ADMIN — APIXABAN 2.5 MILLIGRAM(S): 2.5 TABLET, FILM COATED ORAL at 05:41

## 2025-06-19 RX ADMIN — Medication 100 MILLIGRAM(S): at 13:36

## 2025-06-19 RX ADMIN — Medication 500 MILLIGRAM(S): at 13:35

## 2025-06-19 RX ADMIN — Medication 10 MILLIGRAM(S): at 00:09

## 2025-06-19 RX ADMIN — APIXABAN 2.5 MILLIGRAM(S): 2.5 TABLET, FILM COATED ORAL at 17:22

## 2025-06-19 RX ADMIN — Medication 650 MILLIGRAM(S): at 05:40

## 2025-06-19 RX ADMIN — Medication 2 TABLET(S): at 21:52

## 2025-06-19 RX ADMIN — Medication 81 MILLIGRAM(S): at 21:55

## 2025-06-19 RX ADMIN — Medication 650 MILLIGRAM(S): at 13:36

## 2025-06-19 RX ADMIN — Medication 325 MILLIGRAM(S): at 13:35

## 2025-06-19 RX ADMIN — Medication 100 MILLIGRAM(S): at 05:41

## 2025-06-20 ENCOUNTER — TRANSCRIPTION ENCOUNTER (OUTPATIENT)
Age: 85
End: 2025-06-20

## 2025-06-20 PROCEDURE — 99233 SBSQ HOSP IP/OBS HIGH 50: CPT

## 2025-06-20 RX ORDER — AMLODIPINE BESYLATE 10 MG/1
1 TABLET ORAL
Qty: 30 | Refills: 0
Start: 2025-06-20 | End: 2025-07-19

## 2025-06-20 RX ORDER — LISINOPRIL 30 MG/1
1 TABLET ORAL
Qty: 30 | Refills: 0
Start: 2025-06-20 | End: 2025-07-19

## 2025-06-20 RX ADMIN — APIXABAN 2.5 MILLIGRAM(S): 2.5 TABLET, FILM COATED ORAL at 17:03

## 2025-06-20 RX ADMIN — Medication 100 MILLIGRAM(S): at 21:02

## 2025-06-20 RX ADMIN — Medication 40 MILLIGRAM(S): at 06:03

## 2025-06-20 RX ADMIN — Medication 325 MILLIGRAM(S): at 13:12

## 2025-06-20 RX ADMIN — APIXABAN 2.5 MILLIGRAM(S): 2.5 TABLET, FILM COATED ORAL at 06:04

## 2025-06-20 RX ADMIN — Medication 100 MILLIGRAM(S): at 13:12

## 2025-06-20 RX ADMIN — Medication 81 MILLIGRAM(S): at 21:02

## 2025-06-20 RX ADMIN — LISINOPRIL 100 MILLIGRAM(S): 30 TABLET ORAL at 06:02

## 2025-06-20 RX ADMIN — AMLODIPINE BESYLATE 10 MILLIGRAM(S): 10 TABLET ORAL at 06:03

## 2025-06-20 RX ADMIN — Medication 650 MILLIGRAM(S): at 13:11

## 2025-06-20 RX ADMIN — ATORVASTATIN CALCIUM 80 MILLIGRAM(S): 80 TABLET, FILM COATED ORAL at 21:02

## 2025-06-20 RX ADMIN — Medication 0.1 MILLIGRAM(S): at 06:03

## 2025-06-20 RX ADMIN — Medication 650 MILLIGRAM(S): at 06:03

## 2025-06-20 RX ADMIN — Medication 100 MILLIGRAM(S): at 06:02

## 2025-06-20 RX ADMIN — Medication 650 MILLIGRAM(S): at 21:02

## 2025-06-20 RX ADMIN — Medication 0.1 MILLIGRAM(S): at 13:12

## 2025-06-20 RX ADMIN — Medication 0.1 MILLIGRAM(S): at 21:02

## 2025-06-20 RX ADMIN — Medication 500 MILLIGRAM(S): at 13:12

## 2025-06-21 VITALS
OXYGEN SATURATION: 96 % | RESPIRATION RATE: 18 BRPM | SYSTOLIC BLOOD PRESSURE: 128 MMHG | HEART RATE: 73 BPM | TEMPERATURE: 98 F | DIASTOLIC BLOOD PRESSURE: 72 MMHG

## 2025-06-21 PROCEDURE — 93005 ELECTROCARDIOGRAM TRACING: CPT

## 2025-06-21 PROCEDURE — 97116 GAIT TRAINING THERAPY: CPT

## 2025-06-21 PROCEDURE — 0042T: CPT

## 2025-06-21 PROCEDURE — 97163 PT EVAL HIGH COMPLEX 45 MIN: CPT

## 2025-06-21 PROCEDURE — 82550 ASSAY OF CK (CPK): CPT

## 2025-06-21 PROCEDURE — 97530 THERAPEUTIC ACTIVITIES: CPT

## 2025-06-21 PROCEDURE — 80053 COMPREHEN METABOLIC PANEL: CPT

## 2025-06-21 PROCEDURE — 85610 PROTHROMBIN TIME: CPT

## 2025-06-21 PROCEDURE — 70496 CT ANGIOGRAPHY HEAD: CPT

## 2025-06-21 PROCEDURE — 85730 THROMBOPLASTIN TIME PARTIAL: CPT

## 2025-06-21 PROCEDURE — 85025 COMPLETE CBC W/AUTO DIFF WBC: CPT

## 2025-06-21 PROCEDURE — 36415 COLL VENOUS BLD VENIPUNCTURE: CPT

## 2025-06-21 PROCEDURE — 99285 EMERGENCY DEPT VISIT HI MDM: CPT | Mod: 25

## 2025-06-21 PROCEDURE — 84484 ASSAY OF TROPONIN QUANT: CPT

## 2025-06-21 PROCEDURE — 70450 CT HEAD/BRAIN W/O DYE: CPT

## 2025-06-21 PROCEDURE — 70551 MRI BRAIN STEM W/O DYE: CPT

## 2025-06-21 PROCEDURE — 85027 COMPLETE CBC AUTOMATED: CPT

## 2025-06-21 PROCEDURE — 99239 HOSP IP/OBS DSCHRG MGMT >30: CPT

## 2025-06-21 PROCEDURE — 71045 X-RAY EXAM CHEST 1 VIEW: CPT

## 2025-06-21 PROCEDURE — 80048 BASIC METABOLIC PNL TOTAL CA: CPT

## 2025-06-21 PROCEDURE — 70498 CT ANGIOGRAPHY NECK: CPT

## 2025-06-21 RX ORDER — LISINOPRIL 30 MG/1
1 TABLET ORAL
Qty: 30 | Refills: 0
Start: 2025-06-21 | End: 2025-07-20

## 2025-06-21 RX ADMIN — APIXABAN 2.5 MILLIGRAM(S): 2.5 TABLET, FILM COATED ORAL at 05:13

## 2025-06-21 RX ADMIN — Medication 100 MILLIGRAM(S): at 05:13

## 2025-06-21 RX ADMIN — Medication 0.1 MILLIGRAM(S): at 05:12

## 2025-06-21 RX ADMIN — LISINOPRIL 100 MILLIGRAM(S): 30 TABLET ORAL at 05:12

## 2025-06-21 RX ADMIN — AMLODIPINE BESYLATE 10 MILLIGRAM(S): 10 TABLET ORAL at 05:13

## 2025-06-21 RX ADMIN — Medication 650 MILLIGRAM(S): at 05:12

## 2025-06-21 RX ADMIN — Medication 40 MILLIGRAM(S): at 05:12

## 2025-06-24 NOTE — CDI QUERY NOTE - [id number]_ALL_CORE_HH
Clinical documentation and/or evidence of the patient’s presentation, evaluation, and medical management, as evidenced below, may support a cause and effect link that is not documented in the medical record.  In order to ensure accurate coding and accuracy of the clinical record, the documentation in this patient’s medical record requires additional clarification.      If you think the supporting documentation and/or clinical evidence supports a cause and effect link, please include more specific documentation associated with these findings in addendum to discharge summary.      Please clarify if there is a relationship between the TIA and carotid or PCA stenosis such as:  •	TIA secondary to both bilateral PCA stenosis and left internal carotid stenosis  •	TIA secondary to bilateral PCA stenosis.  •	TIA secondary to left carotid stenosis   •	Other (specify)      Supporting documentation and/or clinical evidence:   Presented for transient episode of slurred speech and R arm weakness  No evidence for CVA  TIA documented  Imaging shows carotid and PCA stenosis    CTA:  < from: CT Angio Neck Stroke Protocol w/ IV Cont (06.01.25 @ 09:26) >  INTERPRETATION:  CLINICAL INDICATION: History of carotid endarterectomy.   Word-finding difficulty, dizziness, slurred speech.Code stroke.    CT ANGIOGRAPHY NECK: Right carotid stent appears patent. Short-segment   moderatenarrowing proximal left cervical ICA.    CT ANGIOGRAPHY HEAD: Moderate narrowing left cavernous segment ICA.   Severe narrowing right distal petrous/proximal cavernous segment ICA.   Mild to moderate narrowing right paraclinoid segment ICA. Focal moderate   narrowing right proximal P2 segment. Focal near-complete occlusion right   distal P2 segment.    MRI  < from: MR Head No Cont (06.03.25 @ 16:29) >  INTERPRETATION:  MR BRAIN    Clinical information: Concern for Stroke. Transient slurred speech and   right arm weakness.  A noncontrast MRI of the brain was performed.    IMPRESSION:  No evidence of a mass or current evidence of acute ischemia. Chronic   small infarcts and white matter ischemic changes.    NEURO 6/6 NOTE:   woke up  AM of admission at 7:30AM and noted that she had difficulty raising her right arm and pulling herself up.  No other focal weakness. Her children also noted she was slurring her words. Upon arrival here, no more symptoms were noted.      Impression: Transient right hemiparesis and dysarthria, concern for left hemispheric TIA. Likely secondary to multifocal extra and intracranial atherosclerotic disease with potential underlying hypercoagulable state given history as noted and reported non adherence with apixaban, anemia, and JENNIFER on CKD with potential encephalopathy.      ATTENDING 6/6-6/9 NOTES:  transient episode of slurred speech and R arm weakness -- she had difficulty raising her right arm and pulling herself up.  No other focal weakness. Her children also noted she was slurring her words. Upon arrival here, no more symptoms, denied any prior episodes.  Admits to noncompliance with Eliquis and misses nightly dose regularly.  Follows with hematology/pulm/nephro.  Has known anemia and CKD, she had Imaging done in the ED showed CT HEAD: No acute intracranial findings, CT PERFUSION: No focal perfusion deficit, CT ANGIOGRAPHY NECK: Right carotid stent appears patent. Short-segment moderate narrowing proximal left cervical ICA.    TIA   History of CVA / Carotid Stenosis   MRI with no acute stroke   Stroke neuro checks q 8 hours      DC:  presented for transient episode of slurred speech and R arm weakness. CT head neg. 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     Thank you

## 2025-07-01 ENCOUNTER — OFFICE (OUTPATIENT)
Dept: URBAN - METROPOLITAN AREA CLINIC 94 | Facility: CLINIC | Age: 85
Setting detail: OPHTHALMOLOGY
End: 2025-07-01
Payer: MEDICARE

## 2025-07-01 DIAGNOSIS — Z96.1: ICD-10-CM

## 2025-07-01 DIAGNOSIS — H01.002: ICD-10-CM

## 2025-07-01 DIAGNOSIS — H40.003: ICD-10-CM

## 2025-07-01 DIAGNOSIS — H01.005: ICD-10-CM

## 2025-07-01 PROCEDURE — 92014 COMPRE OPH EXAM EST PT 1/>: CPT | Performed by: OPHTHALMOLOGY

## 2025-07-01 PROCEDURE — 92250 FUNDUS PHOTOGRAPHY W/I&R: CPT | Performed by: OPHTHALMOLOGY

## 2025-07-01 PROCEDURE — 92083 EXTENDED VISUAL FIELD XM: CPT | Performed by: OPHTHALMOLOGY

## 2025-07-01 ASSESSMENT — LID EXAM ASSESSMENTS
OS_BLEPHARITIS: LLL T
OD_BLEPHARITIS: RLL T

## 2025-07-01 ASSESSMENT — REFRACTION_AUTOREFRACTION
OS_SPHERE: -0.25
OS_CYLINDER: -1.25
OS_AXIS: 072
OD_AXIS: 079
OD_SPHERE: +0.25
OD_CYLINDER: -1.00

## 2025-07-01 ASSESSMENT — VISUAL ACUITY
OS_BCVA: 20/40
OD_BCVA: 20/40

## 2025-07-01 ASSESSMENT — TONOMETRY
OS_IOP_MMHG: 18
OD_IOP_MMHG: 18

## 2025-07-01 ASSESSMENT — PACHYMETRY
OD_CT_UM: 634
OS_CT_CORRECTION: -4
OS_CT_UM: 600
OD_CT_CORRECTION: -6

## 2025-07-01 ASSESSMENT — KERATOMETRY
OS_K1POWER_DIOPTERS: 45.00
OD_K1POWER_DIOPTERS: 45.00
OS_K2POWER_DIOPTERS: 45.25
OD_K2POWER_DIOPTERS: 45.75
OS_AXISANGLE_DEGREES: 151
METHOD_AUTO_MANUAL: AUTO
OD_AXISANGLE_DEGREES: 153

## 2025-07-01 ASSESSMENT — CONFRONTATIONAL VISUAL FIELD TEST (CVF)
OD_FINDINGS: FULL
OS_FINDINGS: FULL

## 2025-07-01 ASSESSMENT — CORNEAL EDEMA - FOLDS/STRIAE: OD_FOLDSSTRIAE: ABSENT

## 2025-07-17 ENCOUNTER — OFFICE (OUTPATIENT)
Dept: URBAN - METROPOLITAN AREA CLINIC 115 | Facility: CLINIC | Age: 85
Setting detail: OPHTHALMOLOGY
End: 2025-07-17
Payer: MEDICARE

## 2025-07-17 DIAGNOSIS — E11.3411: ICD-10-CM

## 2025-07-17 DIAGNOSIS — E11.3412: ICD-10-CM

## 2025-07-17 PROCEDURE — 92134 CPTRZ OPH DX IMG PST SGM RTA: CPT | Performed by: OPHTHALMOLOGY

## 2025-07-17 PROCEDURE — 92235 FLUORESCEIN ANGRPH MLTIFRAME: CPT | Performed by: OPHTHALMOLOGY

## 2025-07-17 PROCEDURE — 92014 COMPRE OPH EXAM EST PT 1/>: CPT | Performed by: OPHTHALMOLOGY

## 2025-07-17 ASSESSMENT — PACHYMETRY
OS_CT_UM: 600
OD_CT_CORRECTION: -6
OD_CT_UM: 634
OS_CT_CORRECTION: -4

## 2025-07-17 ASSESSMENT — VISUAL ACUITY
OD_BCVA: 20/40-2
OS_BCVA: 20/40-

## 2025-07-17 ASSESSMENT — REFRACTION_AUTOREFRACTION
OS_CYLINDER: -1.25
OS_SPHERE: -0.25
OS_AXIS: 072
OD_SPHERE: +0.25
OD_CYLINDER: -1.00
OD_AXIS: 079

## 2025-07-17 ASSESSMENT — KERATOMETRY
OD_K2POWER_DIOPTERS: 45.75
OS_K1POWER_DIOPTERS: 45.00
OS_K2POWER_DIOPTERS: 45.25
OS_AXISANGLE_DEGREES: 151
OD_AXISANGLE_DEGREES: 153
OD_K1POWER_DIOPTERS: 45.00
METHOD_AUTO_MANUAL: AUTO

## 2025-07-17 ASSESSMENT — CORNEAL EDEMA - FOLDS/STRIAE: OD_FOLDSSTRIAE: ABSENT

## 2025-07-17 ASSESSMENT — CONFRONTATIONAL VISUAL FIELD TEST (CVF)
OS_FINDINGS: FULL
OD_FINDINGS: FULL

## 2025-07-17 ASSESSMENT — LID EXAM ASSESSMENTS
OD_BLEPHARITIS: RLL T
OS_BLEPHARITIS: LLL T

## 2025-07-22 ENCOUNTER — NON-APPOINTMENT (OUTPATIENT)
Age: 85
End: 2025-07-22

## 2025-07-22 ENCOUNTER — APPOINTMENT (OUTPATIENT)
Dept: NEUROLOGY | Facility: CLINIC | Age: 85
End: 2025-07-22
Payer: MEDICARE

## 2025-07-22 VITALS
SYSTOLIC BLOOD PRESSURE: 122 MMHG | OXYGEN SATURATION: 97 % | HEIGHT: 68 IN | HEART RATE: 69 BPM | DIASTOLIC BLOOD PRESSURE: 76 MMHG | BODY MASS INDEX: 34.1 KG/M2 | WEIGHT: 225 LBS

## 2025-07-22 DIAGNOSIS — Z78.9 OTHER SPECIFIED HEALTH STATUS: ICD-10-CM

## 2025-07-22 PROCEDURE — G2211 COMPLEX E/M VISIT ADD ON: CPT

## 2025-07-22 PROCEDURE — 99215 OFFICE O/P EST HI 40 MIN: CPT

## 2025-07-22 RX ORDER — ATORVASTATIN CALCIUM 80 MG/1
80 TABLET, FILM COATED ORAL
Refills: 0 | Status: ACTIVE | COMMUNITY

## 2025-07-22 RX ORDER — APIXABAN 2.5 MG/1
2.5 TABLET, FILM COATED ORAL
Refills: 0 | Status: ACTIVE | COMMUNITY

## 2025-07-22 RX ORDER — MULTIVIT WITH MIN/ALA/HERBS 50 MG
TABLET ORAL
Refills: 0 | Status: ACTIVE | COMMUNITY

## 2025-07-22 RX ORDER — ACETAMINOPHEN 325 MG/1
325 TABLET ORAL
Refills: 0 | Status: ACTIVE | COMMUNITY

## 2025-07-22 RX ORDER — PANTOPRAZOLE SODIUM 40 MG/1
40 TABLET, DELAYED RELEASE ORAL
Refills: 0 | Status: ACTIVE | COMMUNITY

## 2025-07-22 RX ORDER — SODIUM BICARBONATE 650 MG/1
650 TABLET ORAL
Refills: 0 | Status: ACTIVE | COMMUNITY

## 2025-07-22 RX ORDER — METOPROLOL SUCCINATE 50 MG/1
50 TABLET, EXTENDED RELEASE ORAL
Refills: 0 | Status: ACTIVE | COMMUNITY

## 2025-07-22 RX ORDER — CLONIDINE HYDROCHLORIDE 0.1 MG/1
0.1 TABLET ORAL
Refills: 0 | Status: ACTIVE | COMMUNITY

## 2025-07-22 RX ORDER — AMLODIPINE BESYLATE 10 MG/1
10 TABLET ORAL
Refills: 0 | Status: ACTIVE | COMMUNITY

## 2025-07-22 RX ORDER — HYDRALAZINE HYDROCHLORIDE 100 MG/1
100 TABLET ORAL
Refills: 0 | Status: ACTIVE | COMMUNITY

## 2025-07-22 RX ORDER — CHLORHEXIDINE GLUCONATE 4 %
325 (65 FE) LIQUID (ML) TOPICAL
Refills: 0 | Status: ACTIVE | COMMUNITY

## 2025-07-22 RX ORDER — GLIPIZIDE 5 MG/1
5 TABLET ORAL
Refills: 0 | Status: ACTIVE | COMMUNITY

## 2025-07-22 RX ORDER — ASCORBIC ACID 500 MG
500 TABLET ORAL
Refills: 0 | Status: ACTIVE | COMMUNITY